# Patient Record
Sex: FEMALE | Race: WHITE | NOT HISPANIC OR LATINO | Employment: OTHER | URBAN - METROPOLITAN AREA
[De-identification: names, ages, dates, MRNs, and addresses within clinical notes are randomized per-mention and may not be internally consistent; named-entity substitution may affect disease eponyms.]

---

## 2017-04-17 ENCOUNTER — TRANSCRIBE ORDERS (OUTPATIENT)
Dept: ADMINISTRATIVE | Facility: HOSPITAL | Age: 72
End: 2017-04-17

## 2017-04-17 ENCOUNTER — ALLSCRIPTS OFFICE VISIT (OUTPATIENT)
Dept: OTHER | Facility: OTHER | Age: 72
End: 2017-04-17

## 2017-04-17 ENCOUNTER — HOSPITAL ENCOUNTER (OUTPATIENT)
Dept: RADIOLOGY | Facility: HOSPITAL | Age: 72
Discharge: HOME | End: 2017-04-17
Attending: INTERNAL MEDICINE
Payer: MEDICARE

## 2017-04-17 DIAGNOSIS — D86.9 SARCOIDOSIS: ICD-10-CM

## 2017-04-17 DIAGNOSIS — R06.09 OTHER FORMS OF DYSPNEA: ICD-10-CM

## 2017-04-17 PROCEDURE — 71020 HB CHEST X-RAY 2VW FRONTAL&LATL: CPT

## 2017-04-18 ENCOUNTER — GENERIC CONVERSION - ENCOUNTER (OUTPATIENT)
Dept: OTHER | Facility: OTHER | Age: 72
End: 2017-04-18

## 2017-04-19 ENCOUNTER — GENERIC CONVERSION - ENCOUNTER (OUTPATIENT)
Dept: OTHER | Facility: OTHER | Age: 72
End: 2017-04-19

## 2017-08-08 ENCOUNTER — TRANSCRIBE ORDERS (OUTPATIENT)
Dept: LAB | Facility: CLINIC | Age: 72
End: 2017-08-08

## 2017-08-08 ENCOUNTER — APPOINTMENT (OUTPATIENT)
Dept: LAB | Facility: CLINIC | Age: 72
End: 2017-08-08
Payer: MEDICARE

## 2017-08-08 DIAGNOSIS — I48.0 PAROXYSMAL ATRIAL FIBRILLATION (HCC): Primary | ICD-10-CM

## 2017-08-08 LAB
INR PPP: 5.4 (ref 0.86–1.16)
PROTHROMBIN TIME: 50.3 SECONDS (ref 12.1–14.4)

## 2017-08-08 PROCEDURE — 85610 PROTHROMBIN TIME: CPT | Performed by: INTERNAL MEDICINE

## 2017-08-08 PROCEDURE — 36415 COLL VENOUS BLD VENIPUNCTURE: CPT | Performed by: INTERNAL MEDICINE

## 2017-08-17 ENCOUNTER — APPOINTMENT (OUTPATIENT)
Dept: LAB | Facility: CLINIC | Age: 72
End: 2017-08-17
Payer: MEDICARE

## 2017-08-17 ENCOUNTER — TRANSCRIBE ORDERS (OUTPATIENT)
Dept: LAB | Facility: CLINIC | Age: 72
End: 2017-08-17

## 2017-08-17 DIAGNOSIS — I48.0 PAROXYSMAL ATRIAL FIBRILLATION (HCC): ICD-10-CM

## 2017-08-17 DIAGNOSIS — I48.0 PAROXYSMAL ATRIAL FIBRILLATION (HCC): Primary | ICD-10-CM

## 2017-08-17 LAB
INR PPP: 2.12 (ref 0.86–1.16)
PROTHROMBIN TIME: 24 SECONDS (ref 12.1–14.4)

## 2017-08-17 PROCEDURE — 36415 COLL VENOUS BLD VENIPUNCTURE: CPT

## 2017-08-17 PROCEDURE — 85610 PROTHROMBIN TIME: CPT

## 2017-08-25 ENCOUNTER — APPOINTMENT (OUTPATIENT)
Dept: LAB | Facility: CLINIC | Age: 72
End: 2017-08-25
Payer: MEDICARE

## 2017-08-25 ENCOUNTER — TRANSCRIBE ORDERS (OUTPATIENT)
Dept: LAB | Facility: CLINIC | Age: 72
End: 2017-08-25

## 2017-08-25 DIAGNOSIS — I48.0 PAROXYSMAL ATRIAL FIBRILLATION (HCC): ICD-10-CM

## 2017-08-25 DIAGNOSIS — I48.0 PAROXYSMAL ATRIAL FIBRILLATION (HCC): Primary | ICD-10-CM

## 2017-08-25 LAB
INR PPP: 2.48 (ref 0.86–1.16)
PROTHROMBIN TIME: 27.1 SECONDS (ref 12.1–14.4)

## 2017-08-25 PROCEDURE — 85610 PROTHROMBIN TIME: CPT

## 2017-08-25 PROCEDURE — 36415 COLL VENOUS BLD VENIPUNCTURE: CPT

## 2017-09-01 ENCOUNTER — APPOINTMENT (OUTPATIENT)
Dept: LAB | Facility: CLINIC | Age: 72
End: 2017-09-01
Payer: MEDICARE

## 2017-09-01 ENCOUNTER — TRANSCRIBE ORDERS (OUTPATIENT)
Dept: LAB | Facility: CLINIC | Age: 72
End: 2017-09-01

## 2017-09-01 DIAGNOSIS — I48.0 PAROXYSMAL ATRIAL FIBRILLATION (HCC): ICD-10-CM

## 2017-09-01 DIAGNOSIS — I48.0 PAROXYSMAL ATRIAL FIBRILLATION (HCC): Primary | ICD-10-CM

## 2017-09-01 LAB
INR PPP: 2.96 (ref 0.86–1.16)
PROTHROMBIN TIME: 31.2 SECONDS (ref 12.1–14.4)

## 2017-09-01 PROCEDURE — 36415 COLL VENOUS BLD VENIPUNCTURE: CPT

## 2017-09-01 PROCEDURE — 85610 PROTHROMBIN TIME: CPT

## 2017-09-08 ENCOUNTER — APPOINTMENT (OUTPATIENT)
Dept: LAB | Facility: CLINIC | Age: 72
End: 2017-09-08
Payer: MEDICARE

## 2017-09-08 ENCOUNTER — TRANSCRIBE ORDERS (OUTPATIENT)
Dept: LAB | Facility: CLINIC | Age: 72
End: 2017-09-08

## 2017-09-08 DIAGNOSIS — I48.0 PAROXYSMAL ATRIAL FIBRILLATION (HCC): Primary | ICD-10-CM

## 2017-09-08 LAB
INR PPP: 2.39 (ref 0.86–1.16)
PROTHROMBIN TIME: 26.4 SECONDS (ref 12.1–14.4)

## 2017-09-08 PROCEDURE — 85610 PROTHROMBIN TIME: CPT | Performed by: INTERNAL MEDICINE

## 2017-09-08 PROCEDURE — 36415 COLL VENOUS BLD VENIPUNCTURE: CPT | Performed by: INTERNAL MEDICINE

## 2017-09-15 ENCOUNTER — TRANSCRIBE ORDERS (OUTPATIENT)
Dept: LAB | Facility: CLINIC | Age: 72
End: 2017-09-15

## 2017-09-15 ENCOUNTER — APPOINTMENT (OUTPATIENT)
Dept: LAB | Facility: CLINIC | Age: 72
End: 2017-09-15
Payer: MEDICARE

## 2017-09-15 DIAGNOSIS — I48.0 PAROXYSMAL ATRIAL FIBRILLATION (HCC): Primary | ICD-10-CM

## 2017-09-15 DIAGNOSIS — I48.0 PAROXYSMAL ATRIAL FIBRILLATION (HCC): ICD-10-CM

## 2017-09-15 LAB
INR PPP: 2.33 (ref 0.86–1.16)
PROTHROMBIN TIME: 25.8 SECONDS (ref 12.1–14.4)

## 2017-09-15 PROCEDURE — 85610 PROTHROMBIN TIME: CPT

## 2017-09-15 PROCEDURE — 36415 COLL VENOUS BLD VENIPUNCTURE: CPT

## 2017-09-27 ENCOUNTER — GENERIC CONVERSION - ENCOUNTER (OUTPATIENT)
Dept: OTHER | Facility: OTHER | Age: 72
End: 2017-09-27

## 2017-10-09 ENCOUNTER — APPOINTMENT (OUTPATIENT)
Dept: LAB | Facility: CLINIC | Age: 72
End: 2017-10-09
Payer: MEDICARE

## 2017-10-09 ENCOUNTER — TRANSCRIBE ORDERS (OUTPATIENT)
Dept: LAB | Facility: CLINIC | Age: 72
End: 2017-10-09

## 2017-10-09 DIAGNOSIS — I25.5 ISCHEMIC CARDIOMYOPATHY: Primary | ICD-10-CM

## 2017-10-09 DIAGNOSIS — I25.5 ISCHEMIC CARDIOMYOPATHY: ICD-10-CM

## 2017-10-09 DIAGNOSIS — Z01.812 ENCOUNTER FOR PRE-OPERATIVE LABORATORY TESTING: ICD-10-CM

## 2017-10-09 LAB
APTT PPP: 41 SECONDS (ref 23–35)
INR PPP: 2.61 (ref 0.86–1.16)
PROTHROMBIN TIME: 28.3 SECONDS (ref 12.1–14.4)

## 2017-10-09 PROCEDURE — 85730 THROMBOPLASTIN TIME PARTIAL: CPT

## 2017-10-09 PROCEDURE — 36415 COLL VENOUS BLD VENIPUNCTURE: CPT

## 2017-10-09 PROCEDURE — 85610 PROTHROMBIN TIME: CPT | Performed by: INTERNAL MEDICINE

## 2017-10-16 ENCOUNTER — ALLSCRIPTS OFFICE VISIT (OUTPATIENT)
Dept: OTHER | Facility: OTHER | Age: 72
End: 2017-10-16

## 2017-10-16 ENCOUNTER — APPOINTMENT (OUTPATIENT)
Dept: LAB | Facility: HOSPITAL | Age: 72
End: 2017-10-16
Payer: MEDICARE

## 2017-10-16 ENCOUNTER — TRANSCRIBE ORDERS (OUTPATIENT)
Dept: ADMINISTRATIVE | Facility: HOSPITAL | Age: 72
End: 2017-10-16

## 2017-10-16 ENCOUNTER — HOSPITAL ENCOUNTER (OUTPATIENT)
Dept: RADIOLOGY | Facility: HOSPITAL | Age: 72
Discharge: HOME | End: 2017-10-16
Payer: MEDICARE

## 2017-10-16 DIAGNOSIS — D86.9 SARCOIDOSIS: ICD-10-CM

## 2017-10-16 DIAGNOSIS — R06.09 OTHER FORMS OF DYSPNEA: ICD-10-CM

## 2017-10-16 DIAGNOSIS — R06.02 SOB (SHORTNESS OF BREATH): Primary | ICD-10-CM

## 2017-10-16 LAB
ALBUMIN SERPL BCP-MCNC: 3.4 G/DL (ref 3.5–5)
ALP SERPL-CCNC: 91 U/L (ref 46–116)
ALT SERPL W P-5'-P-CCNC: 58 U/L (ref 12–78)
ANION GAP SERPL CALCULATED.3IONS-SCNC: 9 MMOL/L (ref 4–13)
AST SERPL W P-5'-P-CCNC: 32 U/L (ref 5–45)
BASOPHILS # BLD AUTO: 0 THOUSANDS/ΜL (ref 0–0.1)
BASOPHILS NFR BLD AUTO: 0 % (ref 0–1)
BILIRUB SERPL-MCNC: 0.4 MG/DL (ref 0.2–1)
BUN SERPL-MCNC: 19 MG/DL (ref 5–25)
CALCIUM SERPL-MCNC: 9.2 MG/DL (ref 8.3–10.1)
CHLORIDE SERPL-SCNC: 103 MMOL/L (ref 100–108)
CO2 SERPL-SCNC: 29 MMOL/L (ref 21–32)
CREAT SERPL-MCNC: 0.99 MG/DL (ref 0.6–1.3)
DEPRECATED D DIMER PPP: 890 NG/ML (FEU) (ref 190–520)
EOSINOPHIL # BLD AUTO: 0.3 THOUSAND/ΜL (ref 0–0.61)
EOSINOPHIL NFR BLD AUTO: 4 % (ref 0–6)
ERYTHROCYTE [DISTWIDTH] IN BLOOD BY AUTOMATED COUNT: 16.4 % (ref 11.6–15.1)
GFR SERPL CREATININE-BSD FRML MDRD: 58 ML/MIN/1.73SQ M
GLUCOSE SERPL-MCNC: 97 MG/DL (ref 65–140)
HCT VFR BLD AUTO: 39.6 % (ref 37–47)
HGB BLD-MCNC: 13.1 G/DL (ref 12–16)
LYMPHOCYTES # BLD AUTO: 0.6 THOUSANDS/ΜL (ref 0.6–4.47)
LYMPHOCYTES NFR BLD AUTO: 9 % (ref 14–44)
MCH RBC QN AUTO: 31 PG (ref 27–31)
MCHC RBC AUTO-ENTMCNC: 33.2 G/DL (ref 31.4–37.4)
MCV RBC AUTO: 93 FL (ref 82–98)
MONOCYTES # BLD AUTO: 0.6 THOUSAND/ΜL (ref 0.17–1.22)
MONOCYTES NFR BLD AUTO: 10 % (ref 4–12)
NEUTROPHILS # BLD AUTO: 5 THOUSANDS/ΜL (ref 1.85–7.62)
NEUTS SEG NFR BLD AUTO: 77 % (ref 43–75)
NRBC BLD AUTO-RTO: 0 /100 WBCS
NT-PROBNP SERPL-MCNC: 1921 PG/ML
PLATELET # BLD AUTO: 290 THOUSANDS/UL (ref 130–400)
PMV BLD AUTO: 8.1 FL (ref 8.9–12.7)
POTASSIUM SERPL-SCNC: 3.5 MMOL/L (ref 3.5–5.3)
PROT SERPL-MCNC: 7.7 G/DL (ref 6.4–8.2)
RBC # BLD AUTO: 4.24 MILLION/UL (ref 4.2–5.4)
SODIUM SERPL-SCNC: 141 MMOL/L (ref 136–145)
WBC # BLD AUTO: 6.5 THOUSAND/UL (ref 4.8–10.8)

## 2017-10-16 PROCEDURE — 80053 COMPREHEN METABOLIC PANEL: CPT

## 2017-10-16 PROCEDURE — 82164 ANGIOTENSIN I ENZYME TEST: CPT

## 2017-10-16 PROCEDURE — 83880 ASSAY OF NATRIURETIC PEPTIDE: CPT

## 2017-10-16 PROCEDURE — 85025 COMPLETE CBC W/AUTO DIFF WBC: CPT

## 2017-10-16 PROCEDURE — 36415 COLL VENOUS BLD VENIPUNCTURE: CPT

## 2017-10-16 PROCEDURE — 85379 FIBRIN DEGRADATION QUANT: CPT

## 2017-10-16 PROCEDURE — 71020 HB CHEST X-RAY 2VW FRONTAL&LATL: CPT

## 2017-10-17 ENCOUNTER — GENERIC CONVERSION - ENCOUNTER (OUTPATIENT)
Dept: OTHER | Facility: OTHER | Age: 72
End: 2017-10-17

## 2017-10-17 LAB — ACE SERPL-CCNC: 113 U/L (ref 14–82)

## 2017-10-18 ENCOUNTER — GENERIC CONVERSION - ENCOUNTER (OUTPATIENT)
Dept: OTHER | Facility: OTHER | Age: 72
End: 2017-10-18

## 2017-10-24 ENCOUNTER — GENERIC CONVERSION - ENCOUNTER (OUTPATIENT)
Dept: OTHER | Facility: OTHER | Age: 72
End: 2017-10-24

## 2017-10-25 NOTE — PROGRESS NOTES
Assessment  1  History of Coronary Artery Quadruple Venous Bypass Graft   2  Pulmonary hypertension (416 8) (I27 20)   3  Sarcoidosis (135) (D86 9)   4  Dyspnea on exertion (786 09) (R06 09)    Plan   (1) ANGIOTENSIN CONVERSIN ENZYME; Status:Resulted - Requires Verification;   Done: 04AJY8487 12:00AM  Due:16Oct2018; Ordered; For:Dyspnea on exertion, Sarcoidosis; Ordered By:Freedom Martinez;   (1) D-DIMER; Status:Resulted - Requires Verification;   Done: 54KKX7240 12:00AM  Due:16Oct2018; Ordered; For:Dyspnea on exertion, Sarcoidosis; Ordered By:Freedom Martinez;   (1) CBC/PLT/DIFF; Status:Resulted - Requires Verification;   Done: 35OQA0048 12:00AM  Due:16Oct2018; Ordered; For:Dyspnea on exertion; Ordered By:Misty Martinez;   (1) COMPREHENSIVE METABOLIC PANEL; Status:Resulted - Requires Verification;   Done: 34DKD4051 12:00AM  Due:16Oct2018; Ordered; For:Dyspnea on exertion; Ordered By:Misty Martinez;   (1) NT- BNP (PRO BRAIN NATRIURETIC PEPTIDE); Status:Resulted - Requires Verification;   Done: 03MGB6876 12:00AM  Due:16Oct2018; Ordered; For:Dyspnea on exertion; Ordered By:Freedom Martinez;   * XR CHEST PA & LATERAL; Status:Resulted - Requires Verification;   Done: 06ZEW6573 12:00AM  Due:16Oct2018; Ordered; For:Dyspnea on exertion, Sarcoidosis; Ordered By:Freedom Martinez; Results/Data  PFT Results v2:     Spirometry: Forced vital capacity: 1 68L-- and-- 74% Predicted Values  Forced expiratory volume in one second: 1 20L-- and-- 71% Predicted Value  Post Bronchodilator Spirometry: Forced vital capacity : 71L  Lung Volumes:   DLCO:    PFT Interpretation:   mild airway obstruction  Obstruction ratio is 71% of predicted  this is likely 2nd to air trapping  Discussion/Summary  Discussion Summary:   [facility] a has dyspnea on exertion which is chronic for her room air oxygen is 96% and with ambulation when is low was 91%   She had coronary artery bypass graft done in June 2017 at [facility]  According to [facility] her cardiologist with like her to have a defibrillator placed  However she had chest x-ray done in subsequent CT of chest October 6, 2017 for which there are multiple areas of consolidation lung fields bilaterally and emphysematous changes the lung fields she has multiple areas of consolidation in the left upper lobe a 2 cm area as well as left lower lobe 1 6 x 2 cm area and 1 5 area of consolidation in the right middle lobe  did review CT of the chest which [facility] had in the past there were lung nodules that were noted at that time  She does have biopsy-proven sarcoidosis  [facility] has some cough, it is not particularly bothersome  She does have shortness of breath which is her main issue an obvious coronary artery disease  I am requesting copy of CT drawn to review and also requesting D- dimer CBC with differential complete metabolic profile BNP Ace level chest x-raydefers nocturnal pulse oximetry  She has a history of obstructive sleep apnea but was not odor not able to tolerate CPAP  She defers any inhaler at this point for which I think Brian [place] would be appropriate for her but she deferswill be reviewing CT drawn of CT scan, chest x-ray and laboratory data am also requesting medical records from her cardiologist in [facility] as well as her hospital stay at [facility] on Sara 10, 2017 for coronary artery disease  Counseling Documentation With Imm: The patient was counseled regarding  Active Problems  1  Allergic Reaction (995 3)   2  Candidiasis, cutaneous (112 3) (B37 2)   3  Dyspnea on exertion (786 09) (R06 09)   4  Eczema (692 9) (L30 9)   5  Encounter for screening mammogram for malignant neoplasm of breast (V76 12)   (Z12 31)   6  Headache (784 0) (R51)   7  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)   8  Hypoxia, sleep related (327 24) (G47 34)   9  Insomnia (780 52) (G47 00)   10  Lumbar disc disorder with myelopathy (722 73) (M51 06)   11   Lymphadenopathy (785 6) (R59 1)   12  Lymphadenopathy, mediastinal (785 6) (R59 0)   13  Obstructive sleep apnea (327 23) (G47 33)   14  Pedal edema (782 3) (R60 0)   15  Pulmonary hypertension (416 8) (I27 20)   16  Pulmonary nodule seen on imaging study (793 11) (R91 1)   17  Sarcoidosis (135) (D86 9)   18  Shortness of breath (786 05) (R06 02)   19  Snoring (786 09) (R06 83)   20  Well adult on routine health check (V70 0) (Z00 00)    Chief Complaint  Chief Complaint Free Text Note Form: Grupo Pritchett is here for CT Scan results   Chief Complaint Chronic Condition [place] Distance: Patient is here today for follow up of chronic conditions described in HPI  History of Present Illness  HPI: Grupo Pritchett is a 30-year-old female who is here today for follow-up  She was last seen by Dr Mustapha Iraheta to in April 2017 apparently she had chest x-ray done and right hilar prominence with seen as well as air space disease bilaterally she has a history of sarcoidosis and airspace disease in the left lung was more prominent   She had CT of the chest without contrast on October 6, 2017 mediastinal and bilateral hilar adenopathy was noted as well as a tiny left pleural effusion she also has a 1 cm gallstone in the gallbladder and emphysematous changes of the lung fields bilaterally 2 cm area of consolidation is present the left upper lobe 1 6 and 2 cm area of consolidation present a left lower lobe 1 5 area consolidation present in the right middle lobe as well as a 4 x 2 x 2 cm area of consolidation in right lower lobe this was done on October 6, 2017last visit patient states that she had a quadruple bypass surgery done this was done at [facility] in done in June 2nd 2017did have nocturnal pulse oximetry ordered but did not have this done she is not using any supplemental oxygen at this point it was requested that she have a complete PFT done this was requested by Dr Mustapha Iraheta however this was not done is Grupo Hence is had complications and has been in the hospital for coronary artery bypass graftingdoes have history of HERMANN  She had sleep study done on July 15, 2014 with AHI of 8 9 but was unable to tolerate CPAP therapy she has not used oxygen she defers oxygen at this point  did have a CT of the chest without contrast in June of 2015 I did review this there is emphysematous changes as well as stable bilateral nodule appearing densities in scarring with no new nodules seenis here today as CT of the chest was done at the [facility] and I do not have the CD ROM available for visualizationwas seen by Dr Laisha Zamora continue 2014 she did have a right supraclavicular lymph node biopsy done in June 2014 that revealed evidence of sarcoidosis small bilateral lung nodules and mediastinal adenopathy are caused by the same Ace level was evaluated 1 23 units/mL      Review of Systems  Complete-Female - Pulm:   Constitutional: feeling tired, but-- No fever, no chills, feels well, no tiredness, no recent weight gain or weight loss  Eyes: no complaints of vision problems  ENT: no rhinitis, no PND, no epistaxis  Cardiovascular: no palpitations, no chest pain  Respiratory: shortness of breath,-- wheezing-- and-- shortness of breath during exertion, but-- as noted in HPI--    The patient presents with complaints of gradual onset of occasional episodes of moderate cough, described as dry  Her symptoms are caused by no known event  Symptoms are unchanged  Gastrointestinal: no complaints of esophageal reflux, no abdominal pain  Genitourinary: no dysuria  Musculoskeletal: no arthralgias, no joint swelling, no myalgias  Integumentary: no rash, no lesions  Neurological: no headache, no fainting, no weakness  Psychiatric: no anxiety, no depression  Hematologic/Lymphatic: â no complaints of swollen glands  Past Medical History  1  History of Acute upper respiratory infection (465 9) (J06 9)   2  History of Cough (786 2) (R05)   3   History of Bell's palsy (V12 49) (Z86 69)   4  History of shortness of breath (V13 89) (L95 067)    Surgical History  1  History of Biopsy Lymph Node   2  History of Coronary Artery Quadruple Venous Bypass Graft   3  History of Hysterectomy  Surgical History Reviewed: The surgical history was reviewed and updated today  Family History  Sister    1  Family history of Ovarian cancer    Social History   · Being A Social Drinker   · Daily Coffee Consumption (2  Cups/Day)   · Former smoker (V15 82) (M92 727)   · Smoked 1 1/2 ppd for 30 years and quit 28 years ago  Social History Reviewed: The social history was reviewed and updated today  Current Meds   1  Aspirin 81 MG TABS; Therapy: (Recorded:73Zux2092) to Recorded  Medication List Reviewed: The medication list was reviewed and updated today  Allergies  1  No Known Drug Allergies    Vitals  Vital Signs    Recorded: 97EPJ0042 10:56AM   Temperature 97 5 F, Oral   Heart Rate 64   Pulse Quality Normal   Respiration Quality Normal   Respiration 18   Systolic 782, LUE, Sitting   Diastolic 68, LUE, Sitting   Height 4 ft 10 in   Weight 212 lb    BMI Calculated 44 31   BSA Calculated 1 87   O2 Saturation 95, RA     Physical Exam    Constitutional   General appearance: No acute distress, well appearing and well nourished  Eyes   Examination of pupil and irises: Anicteric, pupils reactive  Ears, Nose, Mouth, and Throat   External inspection of ears and nose: Normal     Nasal mucosa, septum, and turbinates: Normal without edema or erythema  Lips, teeth, and gums: Normal, good dentition  Oropharynx: Normal with no erythema, edema, exudate or lesions  Mallampati Classification: 4  Neck   Neck: Supple, symmetric, trachea midline, no masses  Jugular veins: Normal     Pulmonary   Chest: Normal     Respiratory effort: No increased work of breathing or signs of respiratory distress  Auscultation of lungs: Clear to auscultation, no rales, no crackles, no wheezing  Cardiovascular   Auscultation of heart: Normal rate and rhythm, normal S1 and S2, no murmurs  Pedal pulses: 2+ bilaterally  Examination of extremities for edema and/or varicosities: Normal     Abdomen   Abdomen: Soft, non-tender  Lymphatic   Palpation of lymph nodes in neck: No lymphadenopathy  Musculoskeletal   Gait and station: Normal     Digits and nails: Normal without clubbing or cyanosis  Neurologic   Mental Status: Normal  Not confused, no evidence of dementia, good comprehension, good concentration  Motor Exam: Gross motor function normal     Skin   Skin and subcutaneous tissue: Limited exam shows no rash  Psychiatric   Orientation to person, place and time: Normal     Mood and affect: Normal        Health Management  Encounter for screening mammogram for malignant neoplasm of breast   Digital Bilateral Screening Mammogram With CAD; every 1 year; Last 51NGE5413; Next Due:  46BDD7003; Overdue  Lymphadenopathy   Digital Bilateral Screening Mammogram With CAD; every 1 year; Last 97BSW9876; Next Due:  55OFL7785;  Overdue    Signatures   Electronically signed by : CHRYSTAL Hood; Oct 16 2017 11:42AM EST                       (Author)    Electronically signed by : CHRYSTAL Hood; Oct 18 2017 11:42AM EST                       (Author)    Electronically signed by : NISHANT Swan ; Oct 24 2017 12:58PM EST                       (Author)

## 2017-10-31 ENCOUNTER — GENERIC CONVERSION - ENCOUNTER (OUTPATIENT)
Dept: OTHER | Facility: OTHER | Age: 72
End: 2017-10-31

## 2017-10-31 ENCOUNTER — HOSPITAL ENCOUNTER (OUTPATIENT)
Dept: PULMONOLOGY | Facility: HOSPITAL | Age: 72
Discharge: HOME | End: 2017-10-31
Payer: MEDICARE

## 2017-10-31 DIAGNOSIS — R06.02 SOB (SHORTNESS OF BREATH): ICD-10-CM

## 2017-10-31 PROCEDURE — 94729 DIFFUSING CAPACITY: CPT

## 2017-10-31 PROCEDURE — 94760 N-INVAS EAR/PLS OXIMETRY 1: CPT

## 2017-10-31 PROCEDURE — 94060 EVALUATION OF WHEEZING: CPT

## 2017-10-31 PROCEDURE — 94726 PLETHYSMOGRAPHY LUNG VOLUMES: CPT

## 2017-10-31 RX ORDER — ALBUTEROL SULFATE 2.5 MG/3ML
2.5 SOLUTION RESPIRATORY (INHALATION) ONCE
Status: DISCONTINUED | OUTPATIENT
Start: 2017-10-31 | End: 2017-11-04 | Stop reason: HOSPADM

## 2017-11-01 ENCOUNTER — GENERIC CONVERSION - ENCOUNTER (OUTPATIENT)
Dept: OTHER | Facility: OTHER | Age: 72
End: 2017-11-01

## 2017-11-06 ENCOUNTER — GENERIC CONVERSION - ENCOUNTER (OUTPATIENT)
Dept: OTHER | Facility: OTHER | Age: 72
End: 2017-11-06

## 2017-11-06 ENCOUNTER — ALLSCRIPTS OFFICE VISIT (OUTPATIENT)
Dept: OTHER | Facility: OTHER | Age: 72
End: 2017-11-06

## 2018-01-10 NOTE — PROGRESS NOTES
Assessment   1  Pulmonary nodule seen on imaging study (793 11) (R91 1)   2  Sarcoidosis (135) (D86 9)   3  Dyspnea on exertion (786 09) (R06 09)    Plan   Sarcoidosis    · PredniSONE 10 MG Oral Tablet; 1 tab daily x 10 days then 1/2 tab daily   Rx By: Jagdish Cazares; Dispense: 30 Days ; #:30 Tablet; Refill: 0;For: Sarcoidosis; DANIELITO = N; Verified Transmission to [address] [place] [address] [address]; Msg to Pharmacy: extra tabs; Last Updated By: System, [employer]; 2017 9:59:35 AM   · CT CHEST W CONTRAST; Status:Need Information - Financial Authorization; Requested    PTW:[id number]; Perform:[facility]; [id number];BNVMGWR; For:Sarcoidosis; Ordered By:Ghislaine Martinez; Results/Data   PFT Results v2:      Spirometry: Forced vital capacity: 1 84L-- and-- 78% Predicted Values  Forced expiratory volume in one second: 1 43L-- and-- 80% Predicted Value  FEV1/FVC ratio is 78  Post Bronchodilator Spirometry:    Lung Volumes: Total lung capacity : 4 15L-- and-- 96% Predicted Values  RV: 1 89L-- and-- 96% Predicted Values  DLCO:      DLCO 52% Predicted Values  DLCO (Hgb Jose) 3 32% Predicted Values      PFT Interpretation:      No restricted or obstructive defect, diminished diffusion capacity that normalizes with correction for alveolar ventilation  Discussion/Summary   Discussion Summary:    Buster Puckett had biopsy-proven sarcoidosis  CD ROM of CT of chest done in 2017 was reviewed  Adenopathy is stable and lung lesions are somewhat enlarged  She did have complete PFT  FVC was 1 84 L or 78% of predicted, FEV1 1 43 L or 80% of predicted and obstruction ratio was 78  Residual volume was 96% of predicted total lung capacity 96% of predicted diffusion capacity was reduced to 52, corrected for alveolar volume was 81%  There was no restrictive or obstructive defect, and diminished diffusion capacity normalizes with correction for alveolar ventilation   is stable for surgery, she is currently on prednisone 10 mg daily for the next 10 days, and will reduce to 5 mg daily  This will continue until after insertion of defibrillator  She will follow-up in the next 2 months  Optimally that should be done after defibrillator placement  of chest will be done in May of 2018  This will be done with contrast  reports feeling less short of breath with prednisone and is aware that this is for a limited time that she remains on prednisone  Goals and Barriers: The patient has the current Goals: Jose Davis will follow calendar for prednisone taper  Patient's Capacity to Self-Care: Patient is able to Self-Care  Active Problems   1  Allergic Reaction (995 3)   2  Atrial fibrillation, unspecified type (427 31) (I48 91)   3  Candidiasis, cutaneous (112 3) (B37 2)   4  Dyspnea on exertion (786 09) (R06 09)   5  Eczema (692 9) (L30 9)   6  Encounter for screening mammogram for malignant neoplasm of breast (V76 12)     (Z12 31)   7  Essential hypertension (401 9) (I10)   8  Headache (784 0) (R51)   9  History of hyperlipidemia (V12 29) (Z86 39)   10  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)   11  Hypoxia, sleep related (327 24) (G47 34)   12  Insomnia (780 52) (G47 00)   13  Lumbar disc disorder with myelopathy (722 73) (M51 06)   14  Lymphadenopathy (785 6) (R59 1)   15  Lymphadenopathy, mediastinal (785 6) (R59 0)   16  Obstructive sleep apnea (327 23) (G47 33)   17  Pedal edema (782 3) (R60 0)   18  Pulmonary hypertension (416 8) (I27 20)   19  Pulmonary nodule seen on imaging study (793 11) (R91 1)   20  Sarcoidosis (135) (D86 9)   21  Shortness of breath (786 05) (R06 02)   22  Snoring (786 09) (R06 83)   23  Well adult on routine health check (V70 0) (Z00 00)    Chief Complaint   Chief Complaint Free Text Note Form: Jose Davis is here for f/u  She c/o sob all the time, especially walking short distance        History of Present Illness   HPI: Jose Davis is a 23-year-old female who is here today for follow-up  She had CT of chest done at [facility]  He fell adenopathy was stable but lung lesions were somewhat enlarged  She will have repeat CT in 6 months  She also agreed to complete pulmonary function test  [facility]  This was done October 6, 2017  There were multiple areas of consolidations in the lung fields bilaterally, and emphysematous changes of the lung field  CD ROM was obtained from [facility]  This was reviewed by Dr Jess Bloom  He is cardiologist in half firs he  She has cardiomyopathy  And is on amiodarone  It was suggested that she have defibrillator placed  was seen by a DR Debra Nicole, cardiologist  Jess Bloom felt that Stacy Yu was stable for insertion of defibrillator  She is on 20 mg x5 days and then 10 mg x5 days she will  She will continue on 10 mg and then go to 5 mg after defibrillator is placed  Review of Systems   Complete-Female - Pulm:      Constitutional: No fever, no chills, feels well, no tiredness, no recent weight gain or weight loss  Eyes: no complaints of vision problems  ENT: no rhinitis, no PND, no epistaxis  Cardiovascular: no palpitations, no chest pain  Respiratory: shortness of breath-- and-- shortness of breath during exertion, but-- as noted in HPI  Gastrointestinal: no complaints of esophageal reflux, no abdominal pain  Genitourinary: no dysuria  Musculoskeletal: no arthralgias, no joint swelling, no myalgias  Integumentary: no rash, no lesions  Neurological: no headache, no fainting, no weakness  Psychiatric: no anxiety, no depression  Hematologic/Lymphatic: â no complaints of swollen glands  Past Medical History   1  History of Acute upper respiratory infection (465 9) (J06 9)   2  History of Cough (786 2) (R05)   3  History of Bell's palsy (V12 49) (Z86 69)   4  History of shortness of breath (V13 89) (D11 372)    Surgical History   1  History of Biopsy Lymph Node   2   History of Coronary Artery Quadruple Venous Bypass Graft   3  History of Hysterectomy  Surgical History Reviewed: The surgical history was reviewed and updated today  Family History   Sister    1  Family history of Ovarian cancer  Family History Reviewed: The family history was reviewed and updated today  Social History    · Being A Social Drinker   · Daily Coffee Consumption (2  Cups/Day)   · Former smoker (V15 82) (L54 201)   · Smoked 1 1/2 ppd for 30 years and quit 28 years ago  Social History Reviewed: The social history was reviewed and updated today  Current Meds    1  Amiodarone HCl - 200 MG Oral Tablet; TAKE 1 TABLET DAILY; Therapy: 43QMN3841 to (Evaluate:17Nov2017); Last Rx:19Lzf4077 Ordered   2  Aspirin 81 MG TABS; Therapy: (Margarite Solum) to Recorded   3  Atorvastatin Calcium 10 MG Oral Tablet; TAKE 1 TABLET DAILY; Therapy: 59XHI0762 to (Evaluate:17Nov2017); Last Rx:17Dfe1082 Ordered   4  Eplerenone 50 MG Oral Tablet; TAKE 1 TABLET DAILY; Therapy: 03PEL3218 to (Evaluate:17Nov2017); Last Rx:18Oct2017 Ordered   5  Furosemide 20 MG Oral Tablet; 1 tab po bid; Therapy: 28HUA2220 to (Last Rx:18Oct2017) Ordered   6  Losartan Potassium 25 MG Oral Tablet; TAKE 1 TABLET DAILY AS DIRECTED; Therapy: 00RTX8722 to (Evaluate:17Nov2017); Last Rx:30Hbe9602 Ordered   7  Metoprolol Tartrate 50 MG Oral Tablet; TAKE 1 TABLET DAILY AS DIRECTED; Therapy: 15OLI6692 to (Evaluate:17Nov2017)  Requested for: 19REZ6599; Last     Rx:84Pzu9942 Ordered   8  PredniSONE 10 MG Oral Tablet; 2 tabs daily x 5days, 1 tab daily x 5 days; Therapy: 72MDJ5493 to [phone])  Requested for: 35JUD2127; Last     Rx:18Oct2017 Ordered   9  Warfarin Sodium 6 MG Oral Tablet; TAKE 1 TABLET DAILY AS DIRECTED; Therapy: 87EAZ5775 to (Evaluate:17Nov2017); Last Rx:18Oct2017 Ordered  Medication List Reviewed: The medication list was reviewed and updated today  Allergies   1   No Known Drug Allergies    Vitals   Vital Signs    Recorded: 93WYR4728 09:30AM   Temperature 97 8 F, Oral   Heart Rate 68   Pulse Quality Normal   Systolic 519, RUE, Sitting   Diastolic 70, RUE, Sitting   Height 4 ft 10 in   Weight 216 lb    BMI Calculated 45 14   BSA Calculated 1 88   O2 Saturation 96, RA     Physical Exam        Constitutional      General appearance: Abnormal   obese  Ears, Nose, Mouth, and Throat      Nasal mucosa, septum, and turbinates: Normal without edema or erythema  Lips, teeth, and gums: Normal, good dentition  Oropharynx: Normal with no erythema, edema, exudate or lesions  Mallampati Classification: 3  Neck      Neck: Supple, symmetric, trachea midline, no masses  Jugular veins: Normal        Pulmonary      Chest: Normal        Respiratory effort: No increased work of breathing or signs of respiratory distress  Auscultation of lungs: Clear to auscultation, no rales, no crackles, no wheezing  Cardiovascular      Auscultation of heart: Normal rate and rhythm, normal S1 and S2, no murmurs  Examination of extremities for edema and/or varicosities: Normal        Abdomen      Abdomen: Soft, non-tender  Lymphatic      Palpation of lymph nodes in neck: No lymphadenopathy  Musculoskeletal      Gait and station: Normal        Digits and nails: Normal without clubbing or cyanosis  Neurologic      Mental Status: Normal  Not confused, no evidence of dementia, good comprehension, good concentration  Motor Exam: Gross motor function normal        Skin      Skin and subcutaneous tissue: Limited exam shows no rash  Psychiatric      Orientation to person, place and time: Normal        Mood and affect: Normal        Health Management   Encounter for screening mammogram for malignant neoplasm of breast   Digital Bilateral Screening Mammogram With CAD; every 1 year; Last 24IRF9693; Next Due:    66LBV5181;  Overdue  Lymphadenopathy   Digital Bilateral Screening Mammogram With CAD; every 1 year; Last 35AZQ4408; Next Due:    69FSH5176;  Overdue    Signatures    Electronically signed by : CHRYSTAL Mcgraw; Nov 6 2017 10:15AM EST                       (Author)     Electronically signed by : NISHANT York ; Jan 11 2018 11:24AM EST                       (Author)

## 2018-01-11 ENCOUNTER — ALLSCRIPTS OFFICE VISIT (OUTPATIENT)
Dept: OTHER | Facility: OTHER | Age: 73
End: 2018-01-11

## 2018-01-11 NOTE — MISCELLANEOUS
Message  I discussed results of nocturnal pulse oximetry done on room air with the patient on 4/29/16  Her average oxygen saturation was 91% and lowest was 74%  She spent only 14 minutes below 88%  Snehal Ring has not been using the oxygen as it causes her nasal congestion  She is not having any nocturnal dyspnea  She is overweight  She had no significant sustained oxygen desaturation I will have her oxygen discontinued by Drake Preston   I did advise her to lose weight as this would help tremendously with her oxygenation at night      Plan  Sarcoidosis    · Discontinue Home Oxygen; Status:Complete;   Done: 80CXP6342    Signatures   Electronically signed by : NISHANT Kitchen ; May  5 2016  5:15PM EST                       (Author)

## 2018-01-12 VITALS
BODY MASS INDEX: 44.5 KG/M2 | RESPIRATION RATE: 18 BRPM | HEART RATE: 64 BPM | TEMPERATURE: 97.5 F | HEIGHT: 58 IN | SYSTOLIC BLOOD PRESSURE: 118 MMHG | WEIGHT: 212 LBS | DIASTOLIC BLOOD PRESSURE: 68 MMHG | OXYGEN SATURATION: 95 %

## 2018-01-12 NOTE — MISCELLANEOUS
Message  Message Free Text Note Form: [facility] is currently on 10 mg prednisone and is feeling better  She will remain on 10mg for next ten days and then will stay on maintanance dose of 5 mg daily until defibrillator placed  CT of chest was reviewed by Dr Rosa Blount via CD ROM  Adenopathy is stable and lung lesions somewhat inlarged  She has bx proven sarcoidois  She is agreeable to complete PFT  this will be reviewed and then evaluation for defibrillator will be made         Signatures   Electronically signed by : CHRYSTAL Franklin; Oct 24 2017 10:41AM EST                       (Author)

## 2018-01-12 NOTE — MISCELLANEOUS
Message  Message Free Text Note Form: jacqueline likely has SOB that is multifiactorial  She is going to agree to begin oral prednsione  I am awaiting for CD ROM to review as she had abnormal CT at [facility]   She has exertional dyspnea and sarcoidosis  I will begin low dose of prednisone; 20 mg x 5 days, 10mg x 5 days  In the interim, I will review CD ROM CT of chest and speak with Dr Daniel Cadena or Dr Jordan Moulton regarding plan of care  Awaiting cardiology records from [place], [place]       Plan    1  Amiodarone HCl - 200 MG Oral Tablet; TAKE 1 TABLET DAILY   2  Warfarin Sodium 6 MG Oral Tablet; TAKE 1 TABLET DAILY AS DIRECTED    3  Eplerenone 50 MG Oral Tablet; TAKE 1 TABLET DAILY   4  Furosemide 20 MG Oral Tablet; 1 tab po bid   5  Losartan Potassium 25 MG Oral Tablet; TAKE 1 TABLET DAILY AS DIRECTED   6  Metoprolol Tartrate 50 MG Oral Tablet; TAKE 1 TABLET DAILY AS DIRECTED    7  Atorvastatin Calcium 10 MG Oral Tablet; TAKE 1 TABLET DAILY    8   PredniSONE 10 MG Oral Tablet; 2 tabs daily x 5days, 1 tab daily x 5 days    Signatures   Electronically signed by : CHRYSTAL Angel; Oct 18 2017 12:02PM EST                       (Author)

## 2018-01-13 VITALS
DIASTOLIC BLOOD PRESSURE: 86 MMHG | TEMPERATURE: 97.7 F | WEIGHT: 215 LBS | SYSTOLIC BLOOD PRESSURE: 134 MMHG | HEART RATE: 85 BPM | BODY MASS INDEX: 43.42 KG/M2 | OXYGEN SATURATION: 100 % | RESPIRATION RATE: 12 BRPM

## 2018-01-14 VITALS
SYSTOLIC BLOOD PRESSURE: 128 MMHG | DIASTOLIC BLOOD PRESSURE: 70 MMHG | TEMPERATURE: 97.8 F | OXYGEN SATURATION: 96 % | HEIGHT: 58 IN | HEART RATE: 68 BPM | WEIGHT: 216 LBS | BODY MASS INDEX: 45.34 KG/M2

## 2018-01-14 NOTE — RESULT NOTES
Message  results of pulse oximetry reviewed from April 2016  Mean oxygen sat was 91%  Only periodic mild oxygen desaturation  Patient does not want the oxygen so will discontinue oxygen        Plan  Sarcoidosis    · Discontinue Home Oxygen; Status:Complete;   Done: [id number]    Signatures   Electronically signed by : INSHANT Rivera ; May  6 2016  4:44PM EST                       (Author)

## 2018-01-15 NOTE — PROGRESS NOTES
Assessment   1  Sarcoidosis (135) (D86 9)   2  Dyspnea on exertion (786 09) (R06 09)   3  Obstructive sleep apnea (327 23) (G47 33)   4  Morbid obesity, unspecified obesity type (278 01) (E66 01)    Plan   Sarcoidosis    · PredniSONE 10 MG Oral Tablet; take 1 tab x 10 days then 1/2 tab x 10 days  Extra    tabs given   Rx By: Jaren Can; Dispense: 20 Days ; #:30 Tablet; Refill: 0;For: Sarcoidosis; DANIELITO = N; Verified Transmission to [address] [place] [address]; Last Updated By: [employer]; 1/11/2018 12:03:11 PM   · Symbicort 160-4 5 MCG/ACT Inhalation Aerosol; INHALE 2 PUFFS Daily   Rx By: Jaren Can; Dispense: 30 Days ; #:1 GM; Refill: 0;For: Sarcoidosis; DANIELITO = N; Dispense Sample   · (1) ANGIOTENSIN CONVERSIN ENZYME; Status:Active; Requested for:01May2018; Perform:[facility]; KCP:[id number]; For:Sarcoidosis; Ordered By:Ila Knott;   · (1) BASIC METABOLIC PROFILE; Status:Active; Requested for:01May2018; Perform:[facility]; KDX:[id number];KSVOCIU; For:Sarcoidosis; Ordered By:Ila Knott; Sarcoidosis, Shortness of breath    · CT CHEST W CONTRAST; Status:Need Information - Financial Authorization; Requested    for:14May2018; Perform:[facility]; ZJO:[id number]; For:Sarcoidosis, Shortness of breath; Ordered By:Jairo Knott;    Discussion/Summary   Discussion Summary:    Increased exertional dyspnea and intermittent wheeze may which may be due to her sarcoidosis  Will give her a short course of prednisone 10 mg for 10 days followed by 5 mg for 10 days  I also will give her trial Symbicort she can use 160 mcg 2 puffs twice a day to see if this helps her breathing and intermittent wheezing  She will let me know if this Symbicort helps her  She has a history of moderate HERMANN but could not tolerate CPAP therapy   She also has history coronary disease and had CABG done June 2017 also at [facility] ID device placed December 2017 and [facility]      She is overweight I did encourage to lose weight  I did order a follow-up CT scan of the chest with contrast regarding sarcoidosis to be done May 2018  She will have a serum Ace level and BMP done at that time as well  Follow-up in May  Counseling Documentation With Imm: The was counseled regarding instructions for management,-- risk factor reductions,-- impressions  total time of encounter was 30 minutes-- and-- 20 minutes was spent counseling  Goals and Barriers: The patient has the current Goals: Try to lose weight  Patient's Capacity to Self-Care: Patient is able to Self-Care  Medication SE Review and Pt Understands Tx: Possible side effects of new medications were reviewed with the patient/guardian today  The treatment plan was reviewed with the patient/guardian  The patient/guardian understands and agrees with the treatment plan      Active Problems   1  Allergic Reaction (995 3)   2  Atrial fibrillation, unspecified type (427 31) (I48 91)   3  Candidiasis, cutaneous (112 3) (B37 2)   4  Dyspnea on exertion (786 09) (R06 09)   5  Eczema (692 9) (L30 9)   6  Encounter for screening mammogram for malignant neoplasm of breast (V76 12)     (Z12 31)   7  Essential hypertension (401 9) (I10)   8  Headache (784 0) (R51)   9  History of hyperlipidemia (V12 29) (Z86 39)   10  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)   11  Hypoxia, sleep related (327 24) (G47 34)   12  Insomnia (780 52) (G47 00)   13  Lumbar disc disorder with myelopathy (722 73) (M51 06)   14  Lymphadenopathy (785 6) (R59 1)   15  Lymphadenopathy, mediastinal (785 6) (R59 0)   16  Obstructive sleep apnea (327 23) (G47 33)   17  Pedal edema (782 3) (R60 0)   18  Pulmonary hypertension (416 8) (I27 20)   19  Pulmonary nodule seen on imaging study (793 11) (R91 1)   20  Sarcoidosis (135) (D86 9)   21  Shortness of breath (786 05) (R06 02)   22  Snoring (786 09) (R06 83)   23   Well adult on routine health check (V70 0) (Z00 00)    Chief Complaint   Chief Complaint Free Text Note Form: Pt presents today for 2m f/u  pt states that she had a defibrillator in stalled  pt states that she has been very tired since the procedure  Pt state that she can't walk far before feeling sob  Pt reports no cough  History of Present Illness   HPI: Usama Vizcarra Has a history of sarcoidosis  she is complaining of some increased dyspnea with activity  She had a right supraclavicular ultrasound guided biopsy done by Dr Deann Hinojosa on 6/2/14 and this revealed non-necrotizing granulomatous lymphadenitis consistent with sarcoidosis  AFB and fungal stains were negative  Serum ACE level done on 5/29 was elevated at 123 u/l  CT scan of the chest done May 15, 2014 showed some small bilateral lung nodules and mediastinal adenopathy  Stacy Yu does have some exertional dyspnea but she is overweight  she does have some mild leg edema  Echocardiogram done June 11 shows normal LV function with an ejection fraction of 60 to 65%, RSVP estimated at 44 mm Hg, and some decreased LV compliance likely due to diastolic dysfunction  Nocturnal pulse oximetry recording was done on 5/28/14 and her lowest oxygen saturation was 68% and mean was 90%  She spent 84 minutes < 88%  She does admit to snoring and sometimes wakes up with shortness of breath at night  She does have excessive daytime somnolence and this is evidenced by an Amenia Sleepiness score of 13  No morning headaches  No fever, or night sweats  Spirometry done May 2014 revealed normal lung volumes  she is now using 2 liters/minute of nasal cannula oxygen at bedtime because of her sleep related hypoxemia  This could be due to HERMANN and possibly alveolar hypoventilation  Stacy Yu does get short of breath with activity  She is not having much in way of any cough  She does have some occasional wheezing   She did have a pacemaker back / ACID implanted December 23, 2017 at [facility]  She of prior coronary artery bypass grafting done 2017 at the same hospital  She is     during her admission for her open heart surgery  This was CABG x4 vessels 2017  Review of Systems   Complete-Female - Pulm:      Constitutional: No fever, no chills, feels well, no tiredness, no recent weight gain or weight loss  Eyes: no complaints of vision problems  ENT: no rhinitis, no PND, no epistaxis  Cardiovascular: no palpitations, no chest pain  Respiratory: as noted in HPI  Gastrointestinal: no complaints of esophageal reflux, no abdominal pain  Genitourinary: no dysuria  Musculoskeletal: no arthralgias, no joint swelling, no myalgias  Integumentary: no rash, no lesions  Neurological: no headache, no fainting, no weakness  Psychiatric: no anxiety, no depression  Hematologic/Lymphatic: â no complaints of swollen glands  ROS Reviewed:    ROS reviewed  Past Medical History   1  History of Acute upper respiratory infection (465 9) (J06 9)   2  History of Cough (786 2) (R05)   3  History of Bell's palsy (V12 49) (Z86 69)   4  History of shortness of breath (V13 89) (Z03 716)    Surgical History   1  History of Biopsy Lymph Node   2  History of Cardio-defib Pulse Generator Implantation Date   3  History of Coronary Artery Quadruple Venous Bypass Graft   4  History of Hysterectomy  Surgical History Reviewed: The surgical history was reviewed and updated today  Family History   Sister    1  Family history of Ovarian cancer  Family History Reviewed: The family history was reviewed and updated today  Social History    · Being A Social Drinker   · Daily Coffee Consumption (2  Cups/Day)   · Former smoker (V15 82) (F84 124)   · Smoked 1 1/2 ppd for 30 years and quit 28 years ago  Social History Reviewed: The social history was reviewed and updated today  The social history was reviewed and is unchanged  Current Meds    1  Aspirin 81 MG TABS; Therapy: (Carmie Ivana) to Recorded   2  Atorvastatin Calcium 10 MG Oral Tablet; TAKE 1 TABLET DAILY; Therapy: 15USV7663 to (Evaluate:17Nov2017); Last Rx:18Oct2017 Ordered   3  Eplerenone 50 MG Oral Tablet; TAKE 1 TABLET DAILY; Therapy: 65ADM2315 to (Evaluate:17Nov2017); Last Rx:18Oct2017 Ordered   4  Furosemide 20 MG Oral Tablet; 1 tab po bid; Therapy: 00LBS4802 to (Last Rx:18Oct2017) Ordered   5  Losartan Potassium 25 MG Oral Tablet; TAKE 1 TABLET DAILY AS DIRECTED; Therapy: 25JGN0721 to (Evaluate:17Nov2017); Last Rx:18Oct2017 Ordered   6  Metoprolol Tartrate 50 MG Oral Tablet; TAKE 1 TABLET DAILY AS DIRECTED; Therapy: 60EEW8110 to (Mey Forbes)  Requested for: 13SRE8490; Last     Rx:18Oct2017 Ordered  Medication List Reviewed: The medication list was reviewed and updated today  Allergies   1  No Known Drug Allergies    Vitals   Vital Signs    Recorded: 64JYV6461 11:24AM   Temperature 97 7 F, Oral   Heart Rate 66   Pulse Quality Normal   Respiration Quality Normal   Respiration 12   Systolic 549, LUE, Sitting   Diastolic 82, LUE, Sitting   Height 4 ft 10 in   Weight 221 lb    BMI Calculated 46 19   BSA Calculated 1 9   O2 Saturation 93, RA     Physical Exam        Constitutional      General appearance: No acute distress, well appearing and well nourished  Ears, Nose, Mouth, and Throat      Nasal mucosa, septum, and turbinates: Normal without edema or erythema  Lips, teeth, and gums: Normal, good dentition  Oropharynx: Normal with no erythema, edema, exudate or lesions  Neck      Neck: Supple, symmetric, trachea midline, no masses  Jugular veins: Normal        Pulmonary      Auscultation of lungs: Clear to auscultation, no rales, no crackles, no wheezing  Cardiovascular      Auscultation of heart: Normal rate and rhythm, normal S1 and S2, no murmurs         Examination of extremities for edema and/or varicosities: Normal        Abdomen      Abdomen: Soft, non-tender  Lymphatic      Palpation of lymph nodes in neck: No lymphadenopathy  Musculoskeletal      Gait and station: Normal        Digits and nails: Normal without clubbing or cyanosis  Neurologic      Mental Status: Normal  Not confused, no evidence of dementia, good comprehension, good concentration  Skin      Skin and subcutaneous tissue: Limited exam shows no rash  Psychiatric      Orientation to person, place and time: Normal        Mood and affect: Normal        Health Management   Encounter for screening mammogram for malignant neoplasm of breast   Digital Bilateral Screening Mammogram With CAD; every 1 year; Last 04SIP1419; Next Due:    59DGT3309; Overdue  Lymphadenopathy   Digital Bilateral Screening Mammogram With CAD; every 1 year; Last 35MEC5777; Next Due:    51JKH2586;  Overdue    Signatures    Electronically signed by : NISHANT Rey ; Jan 14 2018 10:03AM EST                       (Author)

## 2018-01-17 NOTE — MISCELLANEOUS
November 6, 2017        Dr Ana Vásquez    Dear Dr Jeannine Donahue is a patient in this office  She had complete PFT's  done 10/31/17  There is no restrictive or obstructive defect seen on PFT  She does  have diminished diffusion capacity that normalized after correction for alveolar volume  A copy of the complete PFT is attached for your review  CT of the  chest done October 6, 2107 was reviewed by pulmonologist, Dr Vero Contreras  Compared to CT of chest done June, 2016, adenopathy is stable  Bilateral nodularity is somewhat increased in size but otherwise stable  Repeat CT of chest with contrast  will be done in six months  She has biopsy proven diagnosis of sarcoidosis  She was seen in office today  She is currently on prednisone 10 mg daily and will decrease to 5 mg in ten days  She will remain on 5 mg until after the anticipated surgical  procedure  She is stable from a pulmonary standpoint for surgical procedure  Please do not hesitate to contact this office if there are any further questions  Very truly yours,        Leena Pelletier, [address]      Electronically signed by:Misty JARRELL  Nov 6 2017 11:21AM EST

## 2018-01-17 NOTE — RESULT NOTES
Message  Outpatient to discuss chest x-ray findings with her  No answer and voicemail was left will try again tomorrow        Signatures   Electronically signed by : BEAU Dhillon ; Apr 18 2017  2:53PM EST                       (Author)

## 2018-01-18 NOTE — MISCELLANEOUS
November 1, 2017        Dr Oli Etienne    Dear Dr Louis Wilson is a patient at this office  She had complete PFT's done 10/31/17  There is no restrictive or obstuctive defect seen on PFT  She did have diminshed diffusion capacity that normalized with correction for alveolor ventilation  A copy of the complete PFT is attached for your review  CD ROM of CT of chest that  was done October 6, 2017 was reviewed by Dr Melinda Garza  This was compared to CT of chest done June, 2016  Bilateral nodularity  is stable;  repeat CT of chest with contrast will be repeated in 6 months  She does have biopsy proven diagnosis of sarcoidosis  Louvenia Rings should tolerate defibrillator placement and will remain on prednisone 5 mg until after the procedure  She is clinically stable from pulmonary standpoint for procedure  Please do not hesitate to contact this office with any further questions  Very truly yours,        Milton Bright 56[address]          Electronically signed by:Misty JARRELL  Nov 1 2017  4:16PM EST

## 2018-01-23 VITALS
WEIGHT: 221 LBS | OXYGEN SATURATION: 93 % | HEIGHT: 58 IN | DIASTOLIC BLOOD PRESSURE: 82 MMHG | RESPIRATION RATE: 12 BRPM | HEART RATE: 66 BPM | TEMPERATURE: 97.7 F | BODY MASS INDEX: 46.39 KG/M2 | SYSTOLIC BLOOD PRESSURE: 112 MMHG

## 2018-03-30 ENCOUNTER — TRANSCRIBE ORDERS (OUTPATIENT)
Dept: LAB | Facility: CLINIC | Age: 73
End: 2018-03-30

## 2018-03-30 ENCOUNTER — APPOINTMENT (OUTPATIENT)
Dept: LAB | Facility: CLINIC | Age: 73
End: 2018-03-30
Payer: MEDICARE

## 2018-03-30 DIAGNOSIS — D86.9 SARCOIDOSIS: ICD-10-CM

## 2018-03-30 LAB
ANION GAP SERPL CALCULATED.3IONS-SCNC: 6 MMOL/L (ref 4–13)
BUN SERPL-MCNC: 23 MG/DL (ref 5–25)
CALCIUM SERPL-MCNC: 9.2 MG/DL (ref 8.3–10.1)
CHLORIDE SERPL-SCNC: 103 MMOL/L (ref 100–108)
CO2 SERPL-SCNC: 29 MMOL/L (ref 21–32)
CREAT SERPL-MCNC: 0.9 MG/DL (ref 0.6–1.3)
GFR SERPL CREATININE-BSD FRML MDRD: 65 ML/MIN/1.73SQ M
GLUCOSE SERPL-MCNC: 76 MG/DL (ref 65–140)
POTASSIUM SERPL-SCNC: 4.2 MMOL/L (ref 3.5–5.3)
SODIUM SERPL-SCNC: 138 MMOL/L (ref 136–145)

## 2018-03-30 PROCEDURE — 82164 ANGIOTENSIN I ENZYME TEST: CPT

## 2018-03-30 PROCEDURE — 36415 COLL VENOUS BLD VENIPUNCTURE: CPT

## 2018-03-30 PROCEDURE — 80048 BASIC METABOLIC PNL TOTAL CA: CPT

## 2018-04-02 LAB — ACE SERPL-CCNC: 185 U/L (ref 14–82)

## 2018-04-03 ENCOUNTER — NEW REFERRAL (OUTPATIENT)
Dept: URBAN - METROPOLITAN AREA CLINIC 27 | Facility: CLINIC | Age: 73
End: 2018-04-03

## 2018-04-03 DIAGNOSIS — H43.89: ICD-10-CM

## 2018-04-03 DIAGNOSIS — H43.391: ICD-10-CM

## 2018-04-03 DIAGNOSIS — H25.9: ICD-10-CM

## 2018-04-03 DIAGNOSIS — H43.812: ICD-10-CM

## 2018-04-03 PROCEDURE — 92235 FLUORESCEIN ANGRPH MLTIFRAME: CPT

## 2018-04-03 PROCEDURE — 92250 FUNDUS PHOTOGRAPHY W/I&R: CPT

## 2018-04-03 PROCEDURE — 92004 COMPRE OPH EXAM NEW PT 1/>: CPT

## 2018-04-03 PROCEDURE — 92134 CPTRZ OPH DX IMG PST SGM RTA: CPT

## 2018-04-03 ASSESSMENT — VISUAL ACUITY
OD_PH: 20/25
OS_CC: 20/100
OD_CC: 20/30

## 2018-04-03 ASSESSMENT — TONOMETRY
OD_IOP_MMHG: 13
OS_IOP_MMHG: 13

## 2018-04-06 ENCOUNTER — HOSPITAL ENCOUNTER (OUTPATIENT)
Dept: RADIOLOGY | Facility: HOSPITAL | Age: 73
Discharge: HOME | End: 2018-04-06
Attending: INTERNAL MEDICINE
Payer: MEDICARE

## 2018-04-06 DIAGNOSIS — R06.02 SHORTNESS OF BREATH: ICD-10-CM

## 2018-04-06 DIAGNOSIS — D86.9 SARCOIDOSIS: ICD-10-CM

## 2018-04-06 PROCEDURE — 71260 CT THORAX DX C+: CPT

## 2018-04-06 RX ORDER — AMIODARONE HYDROCHLORIDE 200 MG/1
200 TABLET ORAL
COMMUNITY
End: 2019-02-06 | Stop reason: ALTCHOICE

## 2018-04-06 RX ORDER — PREDNISOLONE ACETATE 10 MG/ML
SUSPENSION/ DROPS OPHTHALMIC
Refills: 0 | COMMUNITY
Start: 2018-03-28 | End: 2019-10-09

## 2018-04-06 RX ORDER — ATORVASTATIN CALCIUM 10 MG/1
10 TABLET, FILM COATED ORAL
COMMUNITY
End: 2021-03-19 | Stop reason: SDUPTHER

## 2018-04-06 RX ORDER — WARFARIN SODIUM 4 MG/1
4 TABLET ORAL
COMMUNITY
End: 2019-02-06 | Stop reason: ALTCHOICE

## 2018-04-06 RX ORDER — RAMIPRIL 2.5 MG/1
2.5 CAPSULE ORAL
COMMUNITY

## 2018-04-06 RX ORDER — BUDESONIDE AND FORMOTEROL FUMARATE DIHYDRATE 160; 4.5 UG/1; UG/1
2 AEROSOL RESPIRATORY (INHALATION) DAILY
COMMUNITY
Start: 2018-01-11 | End: 2018-07-24 | Stop reason: ALTCHOICE

## 2018-04-06 RX ORDER — EPLERENONE 50 MG/1
TABLET, FILM COATED ORAL
Refills: 0 | COMMUNITY
Start: 2018-03-09 | End: 2019-02-06 | Stop reason: ALTCHOICE

## 2018-04-06 RX ORDER — WARFARIN SODIUM 6 MG/1
1 TABLET ORAL DAILY
COMMUNITY
Start: 2017-10-18 | End: 2019-02-06 | Stop reason: ALTCHOICE

## 2018-04-06 RX ORDER — LOSARTAN POTASSIUM 25 MG/1
TABLET ORAL
Refills: 0 | COMMUNITY
Start: 2018-03-22

## 2018-04-06 RX ORDER — METOPROLOL SUCCINATE 50 MG/1
TABLET, EXTENDED RELEASE ORAL
Refills: 0 | COMMUNITY
Start: 2018-03-14 | End: 2019-10-09

## 2018-04-06 RX ORDER — PREDNISONE 10 MG/1
5 TABLET ORAL DAILY
COMMUNITY
Start: 2017-11-06 | End: 2019-10-09

## 2018-04-06 RX ORDER — FUROSEMIDE 20 MG/1
TABLET ORAL
Refills: 0 | COMMUNITY
Start: 2018-03-08 | End: 2019-10-09

## 2018-04-06 RX ADMIN — IOHEXOL 100 ML: 350 INJECTION, SOLUTION INTRAVENOUS at 11:28

## 2018-04-10 ENCOUNTER — OFFICE VISIT (OUTPATIENT)
Dept: PULMONOLOGY | Facility: MEDICAL CENTER | Age: 73
End: 2018-04-10
Payer: MEDICARE

## 2018-04-10 VITALS
SYSTOLIC BLOOD PRESSURE: 118 MMHG | HEART RATE: 76 BPM | TEMPERATURE: 97.8 F | DIASTOLIC BLOOD PRESSURE: 74 MMHG | HEIGHT: 59 IN | OXYGEN SATURATION: 94 % | RESPIRATION RATE: 18 BRPM | BODY MASS INDEX: 44.35 KG/M2 | WEIGHT: 220 LBS

## 2018-04-10 DIAGNOSIS — J43.9 BULLOUS EMPHYSEMA (HCC): ICD-10-CM

## 2018-04-10 DIAGNOSIS — D86.9 SARCOIDOSIS: Primary | ICD-10-CM

## 2018-04-10 PROCEDURE — 99214 OFFICE O/P EST MOD 30 MIN: CPT | Performed by: NURSE PRACTITIONER

## 2018-04-10 NOTE — PATIENT INSTRUCTIONS
Sarcoidosis   WHAT YOU NEED TO KNOW:   Sarcoidosis is a condition in which inflammatory cells collect in tissues and organs  These cells form granulomas (lumps) in the lungs, skin, lymph nodes, or eyes  DISCHARGE INSTRUCTIONS:   Medicines:   · Cytotoxic medicines: These decrease redness, pain, and swelling, and help slow down your immune system       · Steroids: This medicine helps decrease inflammation  · NSAIDs:  These medicines decrease swelling and pain  You can buy NSAIDs without a doctor's order  Ask your healthcare provider which medicine is right for you and how much to take  Take as directed  NSAIDs can cause stomach bleeding or kidney problems if not taken correctly  · Acetaminophen: This medicine decreases pain and fever  You can buy acetaminophen without a doctor's order  Ask how much to take and how often to take it  Follow directions  Acetaminophen can cause liver damage if not taken correctly  · Take your medicine as directed  Contact your healthcare provider if you think your medicine is not helping or if you have side effects  Tell him of her if you are allergic to any medicine  Keep a list of the medicines, vitamins, and herbs you take  Include the amounts, and when and why you take them  Bring the list or the pill bottles to follow-up visits  Carry your medicine list with you in case of an emergency  Follow up with your healthcare provider or specialist as directed: You may need to return to have regular checkups and eye exams  Write down your questions so you remember to ask them during your visits  Self-care:   · Eat a variety of healthy foods:  Healthy foods include fruits, vegetables, whole-grain breads, low-fat dairy products, beans, lean meats, and fish  Ask if you need to be on a special diet  · Get plenty of exercise:  Ask your healthcare provider or specialist about the best exercise plan for you   Exercise may help decrease fatigue and improve your symptoms of sarcoidosis  · Do not smoke: If you smoke, it is never too late to quit  Smoking increases your risk of further lung problems  Ask your healthcare provider or specialist for information if you need help quitting  Contact your healthcare provider or specialist if:   · You have a fever  · You have a severe headache and pain in your neck  · You have chills, a cough, or feel weak and achy  · You have pain, redness, and swelling in your muscles and joints  · Your skin is itchy, swollen, or has a rash  · You have questions about your condition or care  Return to the emergency department if:   · You cannot feel your arms or legs, or they become weak  · You have seizures  · You have sudden trouble breathing  · You have trouble thinking and remembering things  · You have severe chest pain  © 2017 [employer]  Information is for End User's use only and may not be sold, redistributed or otherwise used for commercial purposes  All illustrations and images included in CareNotes® are the copyrighted property of A D A M [employer] Inc  or Naveen Pierce  The above information is an  only  It is not intended as medical advice for individual conditions or treatments  Talk to your doctor, nurse or pharmacist before following any medical regimen to see if it is safe and effective for you

## 2018-04-10 NOTE — ASSESSMENT & PLAN NOTE
Alyssa Dean has biopsy-proven sarcoidosis she was diagnosed in 2014  He had a biopsy done and this revealed findings consistent with sarcoidosis  AFB in fungal stains were negative  She also has diastolic dysfunction and mild pulmonary artery hypertension  Since her last visit she has been less short of breath  She had wheezing at her last visit but this has subsided   Alyssa Dean did have repeat CT of the chest done on April 6, 2018  Official report is pending  Did review images and will do so with Dr Knott  Findings appear to be stable  She does have calcified granulomas that are stable in appearance  Ace level was drawn on March 30, 2018  Level is 185  Compared to Ace level drawn on October 16, 2017 it is somewhat increased  Plan is for follow-up in 3 months  I will order repeat Ace level to be done in 3 months  She does have prednisone on hand if her shortness of breath increases

## 2018-04-10 NOTE — ASSESSMENT & PLAN NOTE
[facility] has bullous emphysema  This was seen on CT of the chest   She was a smoker for 40+ years  She quit at the age of approximately 37to 49years old  He smoked pack or pack and half per day  [facility] was given Symbicort at her last visit that down this irritated her oral mucous membranes and throat  I did offer anticholinergic which he defers at this point  [facility] is a way that treatment options include tapering dose of prednisone as well as inhaled corticosteroid

## 2018-04-10 NOTE — PROGRESS NOTES
Assessment/Plan:     Problem List Items Addressed This Visit        Respiratory    Bullous emphysema ([address] )     Matthias Win has bullous emphysema  This was seen on CT of the chest   She was a smoker for 40+ years  She quit at the age of approximately 37to 49years old  He smoked pack or pack and half per day  Matthias Win was given Symbicort at her last visit that down this irritated her oral mucous membranes and throat  I did offer anticholinergic which he defers at this point  Matthias Win is a way that treatment options include tapering dose of prednisone as well as inhaled corticosteroid  Relevant Medications    predniSONE 10 mg tablet    budesonide-formoterol (SYMBICORT) 160-4 5 mcg/act inhaler       Other    Sarcoidosis - Primary     Matthias Win has biopsy-proven sarcoidosis she was diagnosed in 2014  He had a biopsy done and this revealed findings consistent with sarcoidosis  AFB in fungal stains were negative  She also has diastolic dysfunction and mild pulmonary artery hypertension  Since her last visit she has been less short of breath  She had wheezing at her last visit but this has subsided   Matthias Win did have repeat CT of the chest done on April 6, 2018  Official report is pending  Did review images and will do so with Dr Knott  Findings appear to be stable  She does have calcified granulomas that are stable in appearance  Ace level was drawn on March 30, 2018  Level is 185  Compared to Ace level drawn on October 16, 2017 it is somewhat increased  Plan is for follow-up in 3 months  I will order repeat Ace level to be done in 3 months  She does have prednisone on hand if her shortness of breath increases  Relevant Orders    Angiotensin converting enzyme            No Follow-up on file  All questions are answered to the patient's satisfaction and understanding  She verbalizes understanding  She is encouraged to call with any further questions or concerns      Portions of the record may have been created with voice recognition software  Occasional wrong word or "sound a like" substitutions may have occurred due to the inherent limitations of voice recognition software  Read the chart carefully and recognize, using context, where substitutions have occurred  ______________________________________________________________________    Chief Complaint:   Chief Complaint   Patient presents with    Follow-up     Imaging results     Shortness of Breath     Walking       Patient ID: Paola Ruiz is a 71 y o  y o  female has no past medical history on file  4/10/2018  Paola Ruiz is a 60-year-old female who has history of sarcoidosis she was last seen by Dr Knott on January 11, 2018  At that time she had reported increased shortness of breath and wheezing  He felt that this could be secondary to sarcoidosis  She was given Symbicort to trial for shortness of breath  She used for 1 week but found that she had developed mouth sores and discontinued  Paola Ruiz was also given a short course of prednisone but did not take as she felt she did needed  Paola Ruiz has a history of sarcoidosis  She had a right supraclavicular ultrasound-guided biopsy done in June 2014  This revealed non-necrotizing granulomatous lymphadeniitis consistent with sarcoidosis  AFB and fungal stains were negative  Serum Ace level was elevated at 123  She has some exertional dyspnea  He also had 2D echo done June 11, 2017 that showed normal LV function with EF of 60-65%  RSV P was estimated 44 mm of mercury with some decreased left ventricular compliance due to diastolic dysfunction  Paola Ruiz did have nocturnal pulse oximetry done in May of 2014  Lowest oxygen saturation was 68 and mean was 90%  She spent 84 minutes below 88%  She has an [place] Sleepiness Scale 13  This likely could be secondary to obstructive sleep apnea and possibly alveolar hypoventilation    Paola Ruiz had pacemaker/AC ID implanted December 23, 2017 at [facility]   She has history of prior coronary artery bypass in June 2017 at the same hospital   Jr Yao is here today for follow-up  He had CT of the chest done April 6, 2018  Shortness of Breath   This is a chronic problem  The current episode started more than 1 year ago  The problem occurs daily  The problem has been unchanged  Associated symptoms include wheezing  The symptoms are aggravated by exercise  The patient has no known risk factors for DVT/PE  She has tried beta agonist inhalers and oral steroids for the symptoms  The treatment provided mild relief  Review of Systems   Constitutional: Negative  HENT: Negative  Eyes: Positive for visual disturbance  Respiratory: Positive for shortness of breath and wheezing  Cardiovascular: Negative  Gastrointestinal: Negative  Endocrine: Negative  Genitourinary: Negative  Skin: Negative  Allergic/Immunologic: Negative  Neurological: Negative  Hematological: Negative  Psychiatric/Behavioral: Negative  Smoking history: She reports that she quit smoking about 31 years ago  She has a 60 00 pack-year smoking history   She has never used smokeless tobacco     The following portions of the patient's history were reviewed and updated as appropriate: allergies, current medications, past family history, past medical history, past social history, past surgical history and problem list     Immunization History   Administered Date(s) Administered    Influenza Split High Dose Preservative Free IM 10/01/2014    Influenza TIV (IM) 09/10/2013    Pneumococcal Polysaccharide PPV23 10/01/2014     Current Outpatient Prescriptions   Medication Sig Dispense Refill    aspirin 81 MG tablet Take by mouth      atorvastatin (LIPITOR) 10 mg tablet Take 10 mg by mouth      eplerenone (INSPRA) 50 MG tablet   0    furosemide (LASIX) 20 mg tablet   0    losartan (COZAAR) 25 mg tablet   0    metoprolol succinate (TOPROL-XL) 50 mg 24 hr tablet   0    amiodarone 200 mg tablet Take 200 mg by mouth      budesonide-formoterol (SYMBICORT) 160-4 5 mcg/act inhaler Inhale 2 puffs daily      prednisoLONE acetate (PRED FORTE) 1 % ophthalmic suspension instill 1 drop into left eye four times a day  0    predniSONE 10 mg tablet Take by mouth      ramipril (ALTACE) 2 5 mg capsule Take 2 5 mg by mouth      warfarin (COUMADIN) 4 mg tablet Take 4 mg by mouth      warfarin (COUMADIN) 6 mg tablet Take 1 tablet by mouth daily       No current facility-administered medications for this visit  Allergies: Patient has no known allergies  Objective:  Vitals:    04/10/18 1124   BP: 118/74   BP Location: Right arm   Patient Position: Sitting   Cuff Size: Standard   Pulse: 76   Resp: 18   Temp: 97 8 °F (36 6 °C)   TempSrc: Oral   SpO2: 94%   Weight: 99 8 kg (220 lb)   Height: 4' 11" (1 499 m)   Oxygen Therapy  SpO2: 94 %    Wt Readings from Last 3 Encounters:   04/10/18 99 8 kg (220 lb)   01/11/18 100 kg (221 lb)   11/06/17 98 kg (216 lb)     Body mass index is 44 43 kg/m²  Physical Exam   Constitutional: She appears well-developed and well-nourished  HENT:   Head: Normocephalic and atraumatic  Mallampati 3   Eyes: Conjunctivae are normal  Pupils are equal, round, and reactive to light  Neck: Normal range of motion  Neck supple  Cardiovascular: Normal rate and regular rhythm  Pulmonary/Chest: Effort normal and breath sounds normal    Abdominal: Soft  Musculoskeletal: Normal range of motion  Neurological: She is alert  She has normal reflexes  Skin: Skin is warm and dry  Psychiatric: She has a normal mood and affect   Her behavior is normal  Thought content normal        Lab Review:   Appointment on 03/30/2018   Component Date Value    Sodium 03/30/2018 138     Potassium 03/30/2018 4 2     Chloride 03/30/2018 103     CO2 03/30/2018 29     Anion Gap 03/30/2018 6     BUN 03/30/2018 23     Creatinine 03/30/2018 0 90     Glucose 03/30/2018 76     Calcium 03/30/2018 9 2     eGFR 03/30/2018 65     Angio Convert Enzyme 03/30/2018 185*   Appointment on 10/16/2017   Component Date Value    Angio Convert Enzyme 10/16/2017 113*    D-Dimer, Quant 10/16/2017 890*    WBC 10/16/2017 6 50     RBC 10/16/2017 4 24     Hemoglobin 10/16/2017 13 1     Hematocrit 10/16/2017 39 6     MCV 10/16/2017 93     MCH 10/16/2017 31 0     MCHC 10/16/2017 33 2     RDW 10/16/2017 16 4*    MPV 10/16/2017 8 1*    Platelets 30/83/2676 290     nRBC 10/16/2017 0     Neutrophils Relative 10/16/2017 77*    Lymphocytes Relative 10/16/2017 9*    Monocytes Relative 10/16/2017 10     Eosinophils Relative 10/16/2017 4     Basophils Relative 10/16/2017 0     Neutrophils Absolute 10/16/2017 5 00     Lymphocytes Absolute 10/16/2017 0 60     Monocytes Absolute 10/16/2017 0 60     Eosinophils Absolute 10/16/2017 0 30     Basophils Absolute 10/16/2017 0 00     Sodium 10/16/2017 141     Potassium 10/16/2017 3 5     Chloride 10/16/2017 103     CO2 10/16/2017 29     Anion Gap 10/16/2017 9     BUN 10/16/2017 19     Creatinine 10/16/2017 0 99     Glucose 10/16/2017 97     Calcium 10/16/2017 9 2     AST 10/16/2017 32     ALT 10/16/2017 58     Alkaline Phosphatase 10/16/2017 91     Total Protein 10/16/2017 7 7     Albumin 10/16/2017 3 4*    Total Bilirubin 10/16/2017 0 40     eGFR 10/16/2017 58     NT-proBNP 10/16/2017 1921*       Diagnostics:  I have personally reviewed pertinent reports  CT of chest performed on April 6, 2018  Results are pending  I did personally review and will be reviewing with Dr Knott  Office Spirometry Results:     ESS:    No results found

## 2018-05-14 DIAGNOSIS — D86.9 SARCOIDOSIS: ICD-10-CM

## 2018-05-14 DIAGNOSIS — R06.02 SHORTNESS OF BREATH: ICD-10-CM

## 2018-05-29 ENCOUNTER — APPOINTMENT (OUTPATIENT)
Dept: LAB | Facility: CLINIC | Age: 73
End: 2018-05-29
Payer: MEDICARE

## 2018-05-29 ENCOUNTER — TRANSCRIBE ORDERS (OUTPATIENT)
Dept: LAB | Facility: CLINIC | Age: 73
End: 2018-05-29

## 2018-05-29 DIAGNOSIS — I10 ESSENTIAL HYPERTENSION, MALIGNANT: ICD-10-CM

## 2018-05-29 DIAGNOSIS — E78.00 PURE HYPERCHOLESTEROLEMIA: ICD-10-CM

## 2018-05-29 DIAGNOSIS — E78.00 PURE HYPERCHOLESTEROLEMIA: Primary | ICD-10-CM

## 2018-05-29 LAB
ALBUMIN SERPL BCP-MCNC: 3.4 G/DL (ref 3.5–5)
ALP SERPL-CCNC: 79 U/L (ref 46–116)
ALT SERPL W P-5'-P-CCNC: 45 U/L (ref 12–78)
ANION GAP SERPL CALCULATED.3IONS-SCNC: 6 MMOL/L (ref 4–13)
AST SERPL W P-5'-P-CCNC: 32 U/L (ref 5–45)
BILIRUB SERPL-MCNC: 0.49 MG/DL (ref 0.2–1)
BUN SERPL-MCNC: 19 MG/DL (ref 5–25)
CALCIUM SERPL-MCNC: 8.6 MG/DL (ref 8.3–10.1)
CHLORIDE SERPL-SCNC: 106 MMOL/L (ref 100–108)
CHOLEST SERPL-MCNC: 146 MG/DL (ref 50–200)
CO2 SERPL-SCNC: 26 MMOL/L (ref 21–32)
CREAT SERPL-MCNC: 0.97 MG/DL (ref 0.6–1.3)
ERYTHROCYTE [DISTWIDTH] IN BLOOD BY AUTOMATED COUNT: 14.3 % (ref 11.6–15.1)
GFR SERPL CREATININE-BSD FRML MDRD: 59 ML/MIN/1.73SQ M
GLUCOSE P FAST SERPL-MCNC: 88 MG/DL (ref 65–99)
HCT VFR BLD AUTO: 45.5 % (ref 34.8–46.1)
HDLC SERPL-MCNC: 49 MG/DL (ref 40–60)
HGB BLD-MCNC: 14.8 G/DL (ref 11.5–15.4)
LDLC SERPL CALC-MCNC: 80 MG/DL (ref 0–100)
MCH RBC QN AUTO: 33.2 PG (ref 26.8–34.3)
MCHC RBC AUTO-ENTMCNC: 32.5 G/DL (ref 31.4–37.4)
MCV RBC AUTO: 102 FL (ref 82–98)
NONHDLC SERPL-MCNC: 97 MG/DL
PLATELET # BLD AUTO: 233 THOUSANDS/UL (ref 149–390)
PMV BLD AUTO: 10.5 FL (ref 8.9–12.7)
POTASSIUM SERPL-SCNC: 4 MMOL/L (ref 3.5–5.3)
PROT SERPL-MCNC: 7.1 G/DL (ref 6.4–8.2)
RBC # BLD AUTO: 4.46 MILLION/UL (ref 3.81–5.12)
SODIUM SERPL-SCNC: 138 MMOL/L (ref 136–145)
TRIGL SERPL-MCNC: 86 MG/DL
TSH SERPL DL<=0.05 MIU/L-ACNC: 8.51 UIU/ML (ref 0.36–3.74)
WBC # BLD AUTO: 6.64 THOUSAND/UL (ref 4.31–10.16)

## 2018-05-29 PROCEDURE — 85027 COMPLETE CBC AUTOMATED: CPT | Performed by: INTERNAL MEDICINE

## 2018-05-29 PROCEDURE — 36415 COLL VENOUS BLD VENIPUNCTURE: CPT | Performed by: INTERNAL MEDICINE

## 2018-05-29 PROCEDURE — 84443 ASSAY THYROID STIM HORMONE: CPT

## 2018-05-29 PROCEDURE — 80053 COMPREHEN METABOLIC PANEL: CPT | Performed by: INTERNAL MEDICINE

## 2018-05-29 PROCEDURE — 80061 LIPID PANEL: CPT | Performed by: INTERNAL MEDICINE

## 2018-07-03 ENCOUNTER — 3 MONTH EXAM (OUTPATIENT)
Dept: URBAN - METROPOLITAN AREA CLINIC 27 | Facility: CLINIC | Age: 73
End: 2018-07-03

## 2018-07-03 DIAGNOSIS — H43.812: ICD-10-CM

## 2018-07-03 DIAGNOSIS — H43.89: ICD-10-CM

## 2018-07-03 PROCEDURE — 92134 CPTRZ OPH DX IMG PST SGM RTA: CPT

## 2018-07-03 PROCEDURE — 92012 INTRM OPH EXAM EST PATIENT: CPT

## 2018-07-03 ASSESSMENT — VISUAL ACUITY
OD_CC: 20/25
OS_CC: CF 3FT
OD_PH: 20/20-1

## 2018-07-03 ASSESSMENT — TONOMETRY
OS_IOP_MMHG: 20
OD_IOP_MMHG: 22

## 2018-07-17 ENCOUNTER — FOLLOW UP (OUTPATIENT)
Dept: URBAN - METROPOLITAN AREA CLINIC 27 | Facility: CLINIC | Age: 73
End: 2018-07-17

## 2018-07-17 DIAGNOSIS — H43.812: ICD-10-CM

## 2018-07-17 DIAGNOSIS — H43.89: ICD-10-CM

## 2018-07-17 PROCEDURE — 92012 INTRM OPH EXAM EST PATIENT: CPT

## 2018-07-17 ASSESSMENT — VISUAL ACUITY: OS_CC: CF 2FT

## 2018-07-17 ASSESSMENT — TONOMETRY: OS_IOP_MMHG: 22

## 2018-07-24 ENCOUNTER — OFFICE VISIT (OUTPATIENT)
Dept: PULMONOLOGY | Facility: MEDICAL CENTER | Age: 73
End: 2018-07-24
Payer: MEDICARE

## 2018-07-24 VITALS
OXYGEN SATURATION: 98 % | WEIGHT: 221 LBS | HEART RATE: 68 BPM | DIASTOLIC BLOOD PRESSURE: 86 MMHG | SYSTOLIC BLOOD PRESSURE: 124 MMHG | BODY MASS INDEX: 44.55 KG/M2 | HEIGHT: 59 IN | TEMPERATURE: 97.7 F | RESPIRATION RATE: 12 BRPM

## 2018-07-24 DIAGNOSIS — R06.02 SHORTNESS OF BREATH: ICD-10-CM

## 2018-07-24 DIAGNOSIS — G47.33 OBSTRUCTIVE SLEEP APNEA: ICD-10-CM

## 2018-07-24 DIAGNOSIS — D86.9 SARCOIDOSIS: ICD-10-CM

## 2018-07-24 DIAGNOSIS — R06.02 SOB (SHORTNESS OF BREATH): Primary | ICD-10-CM

## 2018-07-24 PROCEDURE — 99214 OFFICE O/P EST MOD 30 MIN: CPT | Performed by: INTERNAL MEDICINE

## 2018-07-24 PROCEDURE — 94010 BREATHING CAPACITY TEST: CPT | Performed by: INTERNAL MEDICINE

## 2018-07-24 RX ORDER — LEVOTHYROXINE SODIUM 0.03 MG/1
25 TABLET ORAL DAILY
COMMUNITY
End: 2019-10-09

## 2018-07-24 NOTE — PROGRESS NOTES
Assessment/Plan:     Problem List Items Addressed This Visit        Respiratory    Obstructive sleep apnea     Lorie Kawasaki has mild OS A  Diagnostic sleep study done July 2014 showed an AHI of 9  She was not able tolerate CPAP therapy  She is not having any excessive daytime somnolence  I did advise her to try to lose weight            Other    Sarcoidosis     Sarcoidosis is stable  I reviewed CT scan of chest done April 6, 2018  This showed stable mediastinal and bilateral hilar adenopathy  There is some conglomerate fibrosis in the superior segment of the right lower lobe with no change compared to prior CT scans  No at new abnormalities noted  Oscar Balint Spirometry today shows normal lung volumes  She is scheduled for left eyes surgery on August 10  Relevant Orders    Pulse Oximeter    Shortness of breath     Lorie Kawasaki is only has mild exertional dyspnea  Her spirometry today shows normal lung volumes  Room air O2 saturation is normal           Other Visit Diagnoses     SOB (shortness of breath)    -  Primary    Relevant Orders    POCT spirometry (Completed)            Return in about 10 months (around 5/24/2019)  All questions are answered to the patient's satisfaction and understanding  She verbalizes understanding  She is encouraged to call with any further questions or concerns  Portions of the record may have been created with voice recognition software  Occasional wrong word or "sound a like" substitutions may have occurred due to the inherent limitations of voice recognition software  Read the chart carefully and recognize, using context, where substitutions have occurred      Electronically Signed by Sofiya Mayen DO    ______________________________________________________________________    Chief Complaint:   Chief Complaint   Patient presents with    Follow-up    Shortness of Breath     pretty good    Wheezing     occurs once in a while       Patient ID: Lorie Kawasaki is a 68 y o  y o  female has a past medical history of 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[address] ); Sarcoidosis; and SOB (shortness of breath)  7/24/2018  HPI     Farooq Leong presents today for a follow-up visit  She has sarcoidosis and also has a history of obstructive sleep apnea for which she cannot tolerate CPAP therapy  She states she is scheduled for eye surgery  on August 16  She is legally blind in her left eye  She is scheduled to have some type of eye surgery by her retinal specialist   She does take Eliquis and amiodarone because of history of atrial fibrillation  She is not having any shortness of breath with her usual activity  No cough, no weight loss  She denies any nocturnal dyspnea  There is no chronic cough  She has a history of mild HERMANN but could not tolerate CPAP therapy  Sleep study done July 15, 2014 showed AHI of 9  She also has history of CABG done June 2017 and has an ACID device placed December 2017 at [facility]   He does get short of breath with exertional activity  Farooq Leong was diagnosed with sarcoidosis in 2014  Biopsy showed evidence of noncaseating granulomas consistent with this diagnosis  A right supraclavicular ultrasound-guided needle biopsy was done of the lymph node in 2014 revealing this diagnosis  At that time her serum Ace level was elevated 123  LAst ACE level in [facility] 2018 was elevated at 185  Review of Systems   Constitutional: Negative for chills, fever and unexpected weight change  HENT: Negative for congestion, rhinorrhea and sore throat  Eyes: Negative for discharge and redness  Respiratory: Negative for cough and shortness of breath  Cardiovascular: Negative for chest pain, palpitations and leg swelling  Gastrointestinal: Negative for abdominal distention, abdominal pain and nausea  Endocrine: Negative for polydipsia and polyphagia  Genitourinary: Negative for dysuria  Musculoskeletal: Negative for joint swelling and myalgias  Skin: Negative for rash     Neurological: Negative for light-headedness  Smoking history: She reports that she quit smoking about 31 years ago  She has a 60 00 pack-year smoking history  She has never used smokeless tobacco     The following portions of the patient's history were reviewed and updated as appropriate: allergies, current medications, past family history, past medical history, past social history, past surgical history and problem list     Immunization History   Administered Date(s) Administered    Influenza Split High Dose Preservative Free IM 10/01/2014    Influenza TIV (IM) 09/10/2013    Pneumococcal Polysaccharide PPV23 10/01/2014     Current Outpatient Prescriptions   Medication Sig Dispense Refill    apixaban (ELIQUIS) 5 mg Take 5 mg by mouth 2 (two) times a day      atorvastatin (LIPITOR) 10 mg tablet Take 10 mg by mouth      eplerenone (INSPRA) 50 MG tablet   0    furosemide (LASIX) 20 mg tablet   0    levothyroxine 25 mcg tablet Take 25 mcg by mouth daily      metoprolol succinate (TOPROL-XL) 50 mg 24 hr tablet   0    amiodarone 200 mg tablet Take 200 mg by mouth      aspirin 81 MG tablet Take by mouth      losartan (COZAAR) 25 mg tablet   0    prednisoLONE acetate (PRED FORTE) 1 % ophthalmic suspension instill 1 drop into left eye four times a day  0    predniSONE 10 mg tablet Take by mouth      ramipril (ALTACE) 2 5 mg capsule Take 2 5 mg by mouth      warfarin (COUMADIN) 4 mg tablet Take 4 mg by mouth      warfarin (COUMADIN) 6 mg tablet Take 1 tablet by mouth daily       No current facility-administered medications for this visit  Allergies: Patient has no known allergies  Objective:  Vitals:    07/24/18 1116   BP: 124/86   BP Location: Left arm   Patient Position: Sitting   Cuff Size: Extra-Large   Pulse: 68   Resp: 12   Temp: 97 7 °F (36 5 °C)   TempSrc: Oral   SpO2: 98%   Weight: 100 kg (221 lb)   Height: 4' 11" (1 499 m)   Oxygen Therapy  SpO2: 98 %      Wt Readings from Last 3 Encounters: 07/24/18 100 kg (221 lb)   04/10/18 99 8 kg (220 lb)   01/11/18 100 kg (221 lb)     Body mass index is 44 64 kg/m²  Physical Exam   Constitutional: She is oriented to person, place, and time  She appears well-developed and well-nourished  No distress  Obese, no conversational dyspnea   HENT:   Head: Normocephalic  Nose: Nose normal    Mouth/Throat: Oropharynx is clear and moist  No oropharyngeal exudate  Eyes: Conjunctivae are normal  Pupils are equal, round, and reactive to light  Neck: Neck supple  No JVD present  No tracheal deviation present  Cardiovascular: Normal rate, regular rhythm and normal heart sounds  Pulmonary/Chest: Effort normal    Lung sounds are clear   Abdominal: Soft  She exhibits no distension  There is no tenderness  There is no guarding  Musculoskeletal: She exhibits no edema  Lymphadenopathy:     She has no cervical adenopathy  Neurological: She is alert and oriented to person, place, and time  Skin: Skin is warm and dry  No rash noted  Psychiatric: She has a normal mood and affect  Her behavior is normal  Thought content normal        Office Spirometry Results: done today  FVC - 2 11 L  90%  FEV! - 1 52 L  87%  FEV1/FVC% - 72%    Normal lung volumes     I did review her CT scan of chest done April 6, 2018    This showed some stable mediastinal and bilateral hilar adenopathy and some conglomeratet fibrosis the superior segment of the right lower lobe with no change compared to prior CT scan dating back to June 2015

## 2018-07-28 PROBLEM — R06.02 SHORTNESS OF BREATH: Status: ACTIVE | Noted: 2018-07-28

## 2018-07-28 PROBLEM — J43.9 BULLOUS EMPHYSEMA (HCC): Status: RESOLVED | Noted: 2018-04-10 | Resolved: 2018-07-28

## 2018-07-28 PROBLEM — G47.33 OBSTRUCTIVE SLEEP APNEA: Status: ACTIVE | Noted: 2018-07-28

## 2018-07-28 NOTE — ASSESSMENT & PLAN NOTE
Sarcoidosis is stable  I reviewed CT scan of chest done April 6, 2018  This showed stable mediastinal and bilateral hilar adenopathy  There is some conglomerate fibrosis in the superior segment of the right lower lobe with no change compared to prior CT scans  No at new abnormalities noted  Adelina Garrison Spirometry today shows normal lung volumes  She is scheduled for left eyes surgery on August 10

## 2018-07-28 NOTE — ASSESSMENT & PLAN NOTE
Greg Kothari is only has mild exertional dyspnea  Her spirometry today shows normal lung volumes    Room air O2 saturation is normal

## 2018-07-28 NOTE — ASSESSMENT & PLAN NOTE
Amber Annabella has mild OS A  Diagnostic sleep study done July 2014 showed an AHI of 9  She was not able tolerate CPAP therapy  She is not having any excessive daytime somnolence    I did advise her to try to lose weight

## 2018-08-16 ENCOUNTER — SURGERY/PROCEDURE (OUTPATIENT)
Dept: URBAN - METROPOLITAN AREA HOSPITAL 7 | Facility: HOSPITAL | Age: 73
End: 2018-08-16

## 2018-08-16 DIAGNOSIS — H43.89: ICD-10-CM

## 2018-08-16 PROCEDURE — 67036 REMOVAL OF INNER EYE FLUID: CPT

## 2018-08-17 ENCOUNTER — 1 DAY POST-OP (OUTPATIENT)
Dept: URBAN - METROPOLITAN AREA CLINIC 11 | Facility: CLINIC | Age: 73
End: 2018-08-17

## 2018-08-17 DIAGNOSIS — H43.89: ICD-10-CM

## 2018-08-17 DIAGNOSIS — Z98.890: ICD-10-CM

## 2018-08-17 PROCEDURE — 99024 POSTOP FOLLOW-UP VISIT: CPT

## 2018-08-17 ASSESSMENT — TONOMETRY: OS_IOP_MMHG: 30

## 2018-08-21 ENCOUNTER — POST-OP CHECK (OUTPATIENT)
Dept: URBAN - METROPOLITAN AREA CLINIC 27 | Facility: CLINIC | Age: 73
End: 2018-08-21

## 2018-08-21 DIAGNOSIS — Z98.890: ICD-10-CM

## 2018-08-21 DIAGNOSIS — H43.89: ICD-10-CM

## 2018-08-21 PROCEDURE — 99024 POSTOP FOLLOW-UP VISIT: CPT

## 2018-08-21 ASSESSMENT — TONOMETRY: OS_IOP_MMHG: 8

## 2018-08-21 ASSESSMENT — VISUAL ACUITY: OS_CC: 20/400

## 2018-09-11 ENCOUNTER — POST-OP CHECK (OUTPATIENT)
Dept: URBAN - METROPOLITAN AREA CLINIC 27 | Facility: CLINIC | Age: 73
End: 2018-09-11

## 2018-09-11 DIAGNOSIS — H35.372: ICD-10-CM

## 2018-09-11 PROCEDURE — 92134 CPTRZ OPH DX IMG PST SGM RTA: CPT

## 2018-09-11 ASSESSMENT — VISUAL ACUITY: OS_CC: 20/200+1

## 2018-09-11 ASSESSMENT — TONOMETRY: OS_IOP_MMHG: 20

## 2018-10-09 ENCOUNTER — POST-OP CHECK (OUTPATIENT)
Dept: URBAN - METROPOLITAN AREA CLINIC 27 | Facility: CLINIC | Age: 73
End: 2018-10-09

## 2018-10-09 DIAGNOSIS — H43.89: ICD-10-CM

## 2018-10-09 DIAGNOSIS — H35.372: ICD-10-CM

## 2018-10-09 PROCEDURE — 99024 POSTOP FOLLOW-UP VISIT: CPT

## 2018-10-09 ASSESSMENT — TONOMETRY: OS_IOP_MMHG: 16

## 2018-10-09 ASSESSMENT — VISUAL ACUITY: OS_CC: 20/100

## 2018-10-30 ENCOUNTER — UNSCHEDULED FOLLOW UP (OUTPATIENT)
Dept: URBAN - METROPOLITAN AREA CLINIC 27 | Facility: CLINIC | Age: 73
End: 2018-10-30

## 2018-10-30 DIAGNOSIS — H43.89: ICD-10-CM

## 2018-10-30 DIAGNOSIS — H35.372: ICD-10-CM

## 2018-10-30 PROCEDURE — 99024 POSTOP FOLLOW-UP VISIT: CPT

## 2018-10-30 ASSESSMENT — TONOMETRY: OS_IOP_MMHG: 15

## 2018-10-30 ASSESSMENT — VISUAL ACUITY: OS_CC: 20/400

## 2018-11-14 ENCOUNTER — TRANSCRIBE ORDERS (OUTPATIENT)
Dept: LAB | Facility: CLINIC | Age: 73
End: 2018-11-14

## 2018-11-14 ENCOUNTER — APPOINTMENT (OUTPATIENT)
Dept: LAB | Facility: CLINIC | Age: 73
End: 2018-11-14
Payer: MEDICARE

## 2018-11-14 DIAGNOSIS — R06.02 SHORTNESS OF BREATH: ICD-10-CM

## 2018-11-14 DIAGNOSIS — E78.00 PURE HYPERCHOLESTEROLEMIA: ICD-10-CM

## 2018-11-14 DIAGNOSIS — E78.00 PURE HYPERCHOLESTEROLEMIA: Primary | ICD-10-CM

## 2018-11-14 LAB
ALBUMIN SERPL BCP-MCNC: 3.4 G/DL (ref 3.5–5)
ALP SERPL-CCNC: 74 U/L (ref 46–116)
ALT SERPL W P-5'-P-CCNC: 29 U/L (ref 12–78)
ANION GAP SERPL CALCULATED.3IONS-SCNC: 5 MMOL/L (ref 4–13)
AST SERPL W P-5'-P-CCNC: 18 U/L (ref 5–45)
BILIRUB SERPL-MCNC: 0.55 MG/DL (ref 0.2–1)
BUN SERPL-MCNC: 19 MG/DL (ref 5–25)
CALCIUM SERPL-MCNC: 9 MG/DL (ref 8.3–10.1)
CHLORIDE SERPL-SCNC: 105 MMOL/L (ref 100–108)
CHOLEST SERPL-MCNC: 171 MG/DL (ref 50–200)
CO2 SERPL-SCNC: 27 MMOL/L (ref 21–32)
CREAT SERPL-MCNC: 0.99 MG/DL (ref 0.6–1.3)
ERYTHROCYTE [DISTWIDTH] IN BLOOD BY AUTOMATED COUNT: 13 % (ref 11.6–15.1)
GFR SERPL CREATININE-BSD FRML MDRD: 57 ML/MIN/1.73SQ M
GLUCOSE P FAST SERPL-MCNC: 89 MG/DL (ref 65–99)
HCT VFR BLD AUTO: 44.7 % (ref 34.8–46.1)
HDLC SERPL-MCNC: 44 MG/DL (ref 40–60)
HGB BLD-MCNC: 14.7 G/DL (ref 11.5–15.4)
LDLC SERPL CALC-MCNC: 95 MG/DL (ref 0–100)
MCH RBC QN AUTO: 33.1 PG (ref 26.8–34.3)
MCHC RBC AUTO-ENTMCNC: 32.9 G/DL (ref 31.4–37.4)
MCV RBC AUTO: 101 FL (ref 82–98)
NONHDLC SERPL-MCNC: 127 MG/DL
PLATELET # BLD AUTO: 236 THOUSANDS/UL (ref 149–390)
PMV BLD AUTO: 10.4 FL (ref 8.9–12.7)
POTASSIUM SERPL-SCNC: 4.1 MMOL/L (ref 3.5–5.3)
PROT SERPL-MCNC: 7.4 G/DL (ref 6.4–8.2)
RBC # BLD AUTO: 4.44 MILLION/UL (ref 3.81–5.12)
SODIUM SERPL-SCNC: 137 MMOL/L (ref 136–145)
TRIGL SERPL-MCNC: 162 MG/DL
TSH SERPL DL<=0.05 MIU/L-ACNC: 4.45 UIU/ML (ref 0.36–3.74)
WBC # BLD AUTO: 5.3 THOUSAND/UL (ref 4.31–10.16)

## 2018-11-14 PROCEDURE — 80061 LIPID PANEL: CPT | Performed by: INTERNAL MEDICINE

## 2018-11-14 PROCEDURE — 85027 COMPLETE CBC AUTOMATED: CPT | Performed by: INTERNAL MEDICINE

## 2018-11-14 PROCEDURE — 80053 COMPREHEN METABOLIC PANEL: CPT | Performed by: INTERNAL MEDICINE

## 2018-11-14 PROCEDURE — 84443 ASSAY THYROID STIM HORMONE: CPT

## 2018-11-14 PROCEDURE — 36415 COLL VENOUS BLD VENIPUNCTURE: CPT | Performed by: INTERNAL MEDICINE

## 2018-11-27 ENCOUNTER — FOLLOW UP (OUTPATIENT)
Dept: URBAN - METROPOLITAN AREA CLINIC 27 | Facility: CLINIC | Age: 73
End: 2018-11-27

## 2018-11-27 DIAGNOSIS — H43.89: ICD-10-CM

## 2018-11-27 DIAGNOSIS — H43.391: ICD-10-CM

## 2018-11-27 DIAGNOSIS — H35.372: ICD-10-CM

## 2018-11-27 DIAGNOSIS — H25.9: ICD-10-CM

## 2018-11-27 PROCEDURE — 92134 CPTRZ OPH DX IMG PST SGM RTA: CPT

## 2018-11-27 PROCEDURE — 92250 FUNDUS PHOTOGRAPHY W/I&R: CPT

## 2018-11-27 PROCEDURE — 92014 COMPRE OPH EXAM EST PT 1/>: CPT

## 2018-11-27 PROCEDURE — 92235 FLUORESCEIN ANGRPH MLTIFRAME: CPT

## 2018-11-27 ASSESSMENT — TONOMETRY
OD_IOP_MMHG: 21
OS_IOP_MMHG: 20

## 2018-11-27 ASSESSMENT — VISUAL ACUITY
OS_CC: 20/400
OD_CC: 20/25

## 2018-12-17 ENCOUNTER — TELEPHONE (OUTPATIENT)
Dept: PULMONOLOGY | Facility: MEDICAL CENTER | Age: 73
End: 2018-12-17

## 2018-12-17 NOTE — TELEPHONE ENCOUNTER
Would like a letter to excuse her from jury duty   Can you please do this for her?    She said Dr Danielle Tom has done this for her on the past

## 2018-12-26 ENCOUNTER — 3 MONTH EXAM (OUTPATIENT)
Dept: URBAN - METROPOLITAN AREA CLINIC 27 | Facility: CLINIC | Age: 73
End: 2018-12-26

## 2018-12-26 DIAGNOSIS — H43.89: ICD-10-CM

## 2018-12-26 DIAGNOSIS — H35.372: ICD-10-CM

## 2018-12-26 DIAGNOSIS — H43.391: ICD-10-CM

## 2018-12-26 PROCEDURE — 92134 CPTRZ OPH DX IMG PST SGM RTA: CPT

## 2018-12-26 PROCEDURE — 92012 INTRM OPH EXAM EST PATIENT: CPT

## 2018-12-26 ASSESSMENT — TONOMETRY
OD_IOP_MMHG: 21
OS_IOP_MMHG: 19

## 2018-12-26 ASSESSMENT — VISUAL ACUITY
OD_CC: 20/25
OS_CC: 20/160

## 2019-01-09 ENCOUNTER — OFFICE VISIT (OUTPATIENT)
Dept: PULMONOLOGY | Facility: MEDICAL CENTER | Age: 74
End: 2019-01-09
Payer: MEDICARE

## 2019-01-09 VITALS
BODY MASS INDEX: 47.02 KG/M2 | HEART RATE: 61 BPM | TEMPERATURE: 97.3 F | DIASTOLIC BLOOD PRESSURE: 78 MMHG | OXYGEN SATURATION: 97 % | HEIGHT: 58 IN | WEIGHT: 224 LBS | SYSTOLIC BLOOD PRESSURE: 110 MMHG | RESPIRATION RATE: 16 BRPM

## 2019-01-09 DIAGNOSIS — I48.0 PAROXYSMAL ATRIAL FIBRILLATION (HCC): ICD-10-CM

## 2019-01-09 DIAGNOSIS — D86.9 SARCOIDOSIS: ICD-10-CM

## 2019-01-09 DIAGNOSIS — R06.02 SHORTNESS OF BREATH: Primary | ICD-10-CM

## 2019-01-09 PROCEDURE — 99214 OFFICE O/P EST MOD 30 MIN: CPT | Performed by: NURSE PRACTITIONER

## 2019-01-09 NOTE — PATIENT INSTRUCTIONS
U will have a CAT scan a year chest  With contrast   He will also need to have a complete pulmonary function test and lab test is given to you  Follow-up will be in 1 month  You were seen today with Dr Vito Land

## 2019-01-09 NOTE — ASSESSMENT & PLAN NOTE
[facility] has shortness of breath on exertion  I did walk her today 40 ft in her room air oxygen remained at 96%  [facility] did have complete pulmonary function testing done December 31, 2017  Forced vital capacity was 1 84 L or 78% of predicted  FEV1 was 1 43 L or 80% of predicted and obstruction ratio was 78%  Total lung capacity was 96% and residual volume 96  Diffusion capacity was 81 when corrected for alveolar volume  There was no restrictive or obstructive defect  Shortness of breath is likely multifactorial   [facility] does have a history of coronary artery disease  She also has sarcoidosis with stable CT finding of chest since April of 2018  According to patient, now she now exercises approximately 3 times a week at a gym  She does have a  and reports feeling somewhat better

## 2019-01-09 NOTE — PROGRESS NOTES
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 and has the try this  She is on oral prednisone 5 mg daily and also steroid eye drops  She also follows with a retinal specialist   I did discuss this case with Dr Severa Belts  Relevant Orders    CT chest with contrast    Angiotensin converting enzyme    Pulmonary function test    Shortness of breath - Primary     Vish Courtney has shortness of breath on exertion  I did walk her today 40 ft in her room air oxygen remained at 96%  Vish Courtney did have complete pulmonary function testing done December 31, 2017  Forced vital capacity was 1 84 L or 78% of predicted  FEV1 was 1 43 L or 80% of predicted and obstruction ratio was 78%  Total lung capacity was 96% and residual volume 96  Diffusion capacity was 81 when corrected for alveolar volume  There was no restrictive or obstructive defect  Shortness of breath is likely multifactorial   Vish Courtney does have a history of coronary artery disease  She also has sarcoidosis with stable CT finding of chest since April of 2018  According to patient, now she now exercises approximately 3 times a week at a gym  She does have a  and reports feeling somewhat better  HPI;  Vish Courtney has a history of sarcoidosis  Last CT of the chest was done April 6, 2018  It was reviewed by Dr Severa Belts at that time  There is stable mediastinal and bilateral hilar adenopathy    There is also some fibrosis in the superior segment of the right lower lobe with no change compared to prior CT scans  According to patient, she now has 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  She is currently on Pred Forte 4 times daily in the left eye  She is also on 5 g of prednisone daily  Courtney Ham does have sarcoidosis  This was diagnosed in 2014  Biopsy showed evidence of non K caseating granulomas consistent with a diagnosis  A right supraclavicular ultrasound-guided needle biopsy was done of the lymph node revealing this diagnosis  At that time her last serum Ace level was elevated at 1:23 a m  And in March 2018 her Ace level was 185  Della Santos also has history of mild obstructive sleep apnea  She could not tolerate CPAP therapy  Apneic hypopnea index in July of 2014 was 9  She has also had history of CABG done in June 2017 and has id [facility] id device placed  Return in about 4 weeks (around 2/6/2019)  All questions are answered to the patient's satisfaction and understanding  She verbalizes understanding  She is encouraged to call with any further questions or concerns  Portions of the record may have been created with voice recognition software  Occasional wrong word or "sound a like" substitutions may have occurred due to the inherent limitations of voice recognition software  Read the chart carefully and recognize, using context, where substitutions have occurred  Electronically Signed by LILLIANA Echavarria    ______________________________________________________________________    Chief Complaint:   Chief Complaint   Patient presents with   [address] [facility] doctor thinks that the fluid behind her retina may be related to the sarcoidosis   Shortness of Breath       Patient ID: Courtney Ham is a 68 y o  y o  female has a past medical history of 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[address] ); Sarcoidosis; and SOB (shortness of breath)  1/9/2019  Patient presents today for follow-up visit  Shortness of Breath   This is a chronic problem   The current episode started more than 1 year ago  The problem occurs daily  The problem has been waxing and waning  Review of Systems   Respiratory: Positive for shortness of breath  Smoking history: She reports that she quit smoking about 32 years ago  She has a 60 00 pack-year smoking history  She has never used smokeless tobacco     The following portions of the patient's history were reviewed and updated as appropriate: allergies, current medications, past family history, past medical history, past social history, past surgical history and problem list     Immunization History   Administered Date(s) Administered    Influenza Split High Dose Preservative Free IM 10/01/2014    Influenza TIV (IM) 09/10/2013    Pneumococcal Polysaccharide PPV23 10/01/2014     Current Outpatient Prescriptions   Medication Sig Dispense Refill    amiodarone 200 mg tablet Take 200 mg by mouth      apixaban (ELIQUIS) 5 mg Take 5 mg by mouth 2 (two) times a day      aspirin 81 MG tablet Take by mouth      atorvastatin (LIPITOR) 10 mg tablet Take 10 mg by mouth      eplerenone (INSPRA) 50 MG tablet   0    furosemide (LASIX) 20 mg tablet   0    levothyroxine 25 mcg tablet Take 25 mcg by mouth daily      losartan (COZAAR) 25 mg tablet   0    metoprolol succinate (TOPROL-XL) 50 mg 24 hr tablet   0    prednisoLONE acetate (PRED FORTE) 1 % ophthalmic suspension instill 1 drop into left eye four times a day  0    predniSONE 10 mg tablet Take 5 mg by mouth daily        ramipril (ALTACE) 2 5 mg capsule Take 2 5 mg by mouth      warfarin (COUMADIN) 4 mg tablet Take 4 mg by mouth      warfarin (COUMADIN) 6 mg tablet Take 1 tablet by mouth daily       No current facility-administered medications for this visit  Allergies: Patient has no known allergies      Objective:  Vitals:    01/09/19 1115   BP: 110/78   BP Location: Left arm   Patient Position: Sitting   Cuff Size: Adult   Pulse: 61   Resp: 16   Temp: (!) 97 3 °F (36 3 °C)   TempSrc: Tympanic   SpO2: 97%   Weight: 102 kg (224 lb)   Height: 4' 10" (1 473 m)   Oxygen Therapy  SpO2: 97 %    Wt Readings from Last 3 Encounters:   01/09/19 102 kg (224 lb)   07/24/18 100 kg (221 lb)   04/10/18 99 8 kg (220 lb)     Body mass index is 46 82 kg/m²  Physical Exam    Lab Review:   Appointment on 11/14/2018   Component Date Value    TSH 3RD GENERATON 11/14/2018 4 450*   Transcribe Orders on 11/14/2018   Component Date Value    WBC 11/14/2018 5 30     RBC 11/14/2018 4 44     Hemoglobin 11/14/2018 14 7     Hematocrit 11/14/2018 44 7     MCV 11/14/2018 101*    MCH 11/14/2018 33 1     MCHC 11/14/2018 32 9     RDW 11/14/2018 13 0     Platelets 38/71/9915 236     MPV 11/14/2018 10 4     Cholesterol 11/14/2018 171     Triglycerides 11/14/2018 162*    HDL, Direct 11/14/2018 44     LDL Calculated 11/14/2018 95     Non-HDL-Chol (CHOL-HDL) 11/14/2018 127     Sodium 11/14/2018 137     Potassium 11/14/2018 4 1     Chloride 11/14/2018 105     CO2 11/14/2018 27     ANION GAP 11/14/2018 5     BUN 11/14/2018 19     Creatinine 11/14/2018 0 99     Glucose, Fasting 11/14/2018 89     Calcium 11/14/2018 9 0     AST 11/14/2018 18     ALT 11/14/2018 29     Alkaline Phosphatase 11/14/2018 74     Total Protein 11/14/2018 7 4     Albumin 11/14/2018 3 4*    Total Bilirubin 11/14/2018 0 55     eGFR 11/14/2018 57        Diagnostics:  I have personally reviewed pertinent reports  Office Spirometry Results:     ESS:    No results found

## 2019-01-09 NOTE — ASSESSMENT & PLAN NOTE
Patient has a history of atrial fibrillation  She is currently on Eliquis and follows with cardiologist Dr Sebastian Forbes who is from [facility]

## 2019-01-09 NOTE — ASSESSMENT & PLAN NOTE
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

## 2019-01-15 ENCOUNTER — HOSPITAL ENCOUNTER (OUTPATIENT)
Dept: PULMONOLOGY | Facility: HOSPITAL | Age: 74
Discharge: HOME | End: 2019-01-15
Payer: MEDICARE

## 2019-01-15 ENCOUNTER — HOSPITAL ENCOUNTER (OUTPATIENT)
Dept: RADIOLOGY | Facility: HOSPITAL | Age: 74
Discharge: HOME | End: 2019-01-15
Payer: MEDICARE

## 2019-01-15 DIAGNOSIS — D86.9 SARCOIDOSIS: ICD-10-CM

## 2019-01-15 PROCEDURE — 94060 EVALUATION OF WHEEZING: CPT | Performed by: INTERNAL MEDICINE

## 2019-01-15 PROCEDURE — 94726 PLETHYSMOGRAPHY LUNG VOLUMES: CPT

## 2019-01-15 PROCEDURE — 94760 N-INVAS EAR/PLS OXIMETRY 1: CPT

## 2019-01-15 PROCEDURE — 94060 EVALUATION OF WHEEZING: CPT

## 2019-01-15 PROCEDURE — 94729 DIFFUSING CAPACITY: CPT | Performed by: INTERNAL MEDICINE

## 2019-01-15 PROCEDURE — 94726 PLETHYSMOGRAPHY LUNG VOLUMES: CPT | Performed by: INTERNAL MEDICINE

## 2019-01-15 PROCEDURE — 71260 CT THORAX DX C+: CPT

## 2019-01-15 PROCEDURE — 94729 DIFFUSING CAPACITY: CPT

## 2019-01-15 RX ORDER — ALBUTEROL SULFATE 2.5 MG/3ML
2.5 SOLUTION RESPIRATORY (INHALATION) ONCE
Status: DISCONTINUED | OUTPATIENT
Start: 2019-01-15 | End: 2019-01-19 | Stop reason: HOSPADM

## 2019-01-15 RX ADMIN — IOHEXOL 85 ML: 350 INJECTION, SOLUTION INTRAVENOUS at 08:07

## 2019-01-17 ENCOUNTER — APPOINTMENT (OUTPATIENT)
Dept: LAB | Facility: CLINIC | Age: 74
End: 2019-01-17
Payer: MEDICARE

## 2019-01-17 ENCOUNTER — TRANSCRIBE ORDERS (OUTPATIENT)
Dept: LAB | Facility: CLINIC | Age: 74
End: 2019-01-17

## 2019-01-17 DIAGNOSIS — R60.0 LOCALIZED EDEMA: ICD-10-CM

## 2019-01-17 DIAGNOSIS — M86.9 SAPHO SYNDROME (HCC): ICD-10-CM

## 2019-01-17 DIAGNOSIS — L70.9 SAPHO SYNDROME (HCC): ICD-10-CM

## 2019-01-17 DIAGNOSIS — I25.10 DISEASE OF CARDIOVASCULAR SYSTEM: ICD-10-CM

## 2019-01-17 DIAGNOSIS — D86.9 SARCOIDOSIS: ICD-10-CM

## 2019-01-17 DIAGNOSIS — M85.80 SAPHO SYNDROME (HCC): ICD-10-CM

## 2019-01-17 DIAGNOSIS — Z01.810 PRE-OPERATIVE CARDIOVASCULAR EXAMINATION, ICD IN PLACE: ICD-10-CM

## 2019-01-17 DIAGNOSIS — Z00.00 ROUTINE GENERAL MEDICAL EXAMINATION AT A HEALTH CARE FACILITY: Primary | ICD-10-CM

## 2019-01-17 DIAGNOSIS — Z95.810 PRE-OPERATIVE CARDIOVASCULAR EXAMINATION, ICD IN PLACE: ICD-10-CM

## 2019-01-17 DIAGNOSIS — M65.9 SAPHO SYNDROME (HCC): ICD-10-CM

## 2019-01-17 DIAGNOSIS — D86.0 PULMONARY SARCOIDOSIS (HCC): ICD-10-CM

## 2019-01-17 DIAGNOSIS — I27.20 PROGRESSIVE PULMONARY HYPERTENSION (HCC): ICD-10-CM

## 2019-01-17 DIAGNOSIS — L40.3 SAPHO SYNDROME (HCC): ICD-10-CM

## 2019-01-17 DIAGNOSIS — E55.9 AVITAMINOSIS D: ICD-10-CM

## 2019-01-17 DIAGNOSIS — Z00.00 ROUTINE GENERAL MEDICAL EXAMINATION AT A HEALTH CARE FACILITY: ICD-10-CM

## 2019-01-17 LAB
25(OH)D3 SERPL-MCNC: 20.3 NG/ML (ref 30–100)
ALBUMIN SERPL BCP-MCNC: 3.3 G/DL (ref 3.5–5)
ALP SERPL-CCNC: 64 U/L (ref 46–116)
ALT SERPL W P-5'-P-CCNC: 32 U/L (ref 12–78)
ANION GAP SERPL CALCULATED.3IONS-SCNC: 6 MMOL/L (ref 4–13)
AST SERPL W P-5'-P-CCNC: 18 U/L (ref 5–45)
BASOPHILS # BLD AUTO: 0.04 THOUSANDS/ΜL (ref 0–0.1)
BASOPHILS NFR BLD AUTO: 1 % (ref 0–1)
BILIRUB SERPL-MCNC: 0.48 MG/DL (ref 0.2–1)
BUN SERPL-MCNC: 19 MG/DL (ref 5–25)
CALCIUM SERPL-MCNC: 9 MG/DL (ref 8.3–10.1)
CHLORIDE SERPL-SCNC: 106 MMOL/L (ref 100–108)
CHOLEST SERPL-MCNC: 160 MG/DL (ref 50–200)
CO2 SERPL-SCNC: 26 MMOL/L (ref 21–32)
CREAT SERPL-MCNC: 0.9 MG/DL (ref 0.6–1.3)
EOSINOPHIL # BLD AUTO: 0.24 THOUSAND/ΜL (ref 0–0.61)
EOSINOPHIL NFR BLD AUTO: 5 % (ref 0–6)
ERYTHROCYTE [DISTWIDTH] IN BLOOD BY AUTOMATED COUNT: 13.3 % (ref 11.6–15.1)
GFR SERPL CREATININE-BSD FRML MDRD: 64 ML/MIN/1.73SQ M
GLUCOSE P FAST SERPL-MCNC: 78 MG/DL (ref 65–99)
HCT VFR BLD AUTO: 41.1 % (ref 34.8–46.1)
HDLC SERPL-MCNC: 48 MG/DL (ref 40–60)
HGB BLD-MCNC: 13.2 G/DL (ref 11.5–15.4)
IMM GRANULOCYTES # BLD AUTO: 0.02 THOUSAND/UL (ref 0–0.2)
IMM GRANULOCYTES NFR BLD AUTO: 0 % (ref 0–2)
LDLC SERPL CALC-MCNC: 88 MG/DL (ref 0–100)
LYMPHOCYTES # BLD AUTO: 0.4 THOUSANDS/ΜL (ref 0.6–4.47)
LYMPHOCYTES NFR BLD AUTO: 8 % (ref 14–44)
MAGNESIUM SERPL-MCNC: 2.3 MG/DL (ref 1.6–2.6)
MCH RBC QN AUTO: 33.2 PG (ref 26.8–34.3)
MCHC RBC AUTO-ENTMCNC: 32.1 G/DL (ref 31.4–37.4)
MCV RBC AUTO: 103 FL (ref 82–98)
MONOCYTES # BLD AUTO: 0.67 THOUSAND/ΜL (ref 0.17–1.22)
MONOCYTES NFR BLD AUTO: 14 % (ref 4–12)
NEUTROPHILS # BLD AUTO: 3.61 THOUSANDS/ΜL (ref 1.85–7.62)
NEUTS SEG NFR BLD AUTO: 72 % (ref 43–75)
NONHDLC SERPL-MCNC: 112 MG/DL
NRBC BLD AUTO-RTO: 0 /100 WBCS
PLATELET # BLD AUTO: 223 THOUSANDS/UL (ref 149–390)
PMV BLD AUTO: 10.2 FL (ref 8.9–12.7)
POTASSIUM SERPL-SCNC: 3.8 MMOL/L (ref 3.5–5.3)
PROT SERPL-MCNC: 6.6 G/DL (ref 6.4–8.2)
RBC # BLD AUTO: 3.98 MILLION/UL (ref 3.81–5.12)
SODIUM SERPL-SCNC: 138 MMOL/L (ref 136–145)
TRIGL SERPL-MCNC: 118 MG/DL
TSH SERPL DL<=0.05 MIU/L-ACNC: 4.22 UIU/ML (ref 0.36–3.74)
WBC # BLD AUTO: 4.98 THOUSAND/UL (ref 4.31–10.16)

## 2019-01-17 PROCEDURE — 82164 ANGIOTENSIN I ENZYME TEST: CPT

## 2019-01-17 PROCEDURE — 80061 LIPID PANEL: CPT | Performed by: FAMILY MEDICINE

## 2019-01-17 PROCEDURE — 82306 VITAMIN D 25 HYDROXY: CPT | Performed by: FAMILY MEDICINE

## 2019-01-17 PROCEDURE — 83735 ASSAY OF MAGNESIUM: CPT | Performed by: FAMILY MEDICINE

## 2019-01-17 PROCEDURE — 85025 COMPLETE CBC W/AUTO DIFF WBC: CPT | Performed by: FAMILY MEDICINE

## 2019-01-17 PROCEDURE — 36415 COLL VENOUS BLD VENIPUNCTURE: CPT

## 2019-01-17 PROCEDURE — 84443 ASSAY THYROID STIM HORMONE: CPT | Performed by: FAMILY MEDICINE

## 2019-01-17 PROCEDURE — 80053 COMPREHEN METABOLIC PANEL: CPT

## 2019-01-18 LAB — ACE SERPL-CCNC: 93 U/L (ref 14–82)

## 2019-02-06 ENCOUNTER — OFFICE VISIT (OUTPATIENT)
Dept: PULMONOLOGY | Facility: MEDICAL CENTER | Age: 74
End: 2019-02-06
Payer: MEDICARE

## 2019-02-06 VITALS
TEMPERATURE: 98.4 F | WEIGHT: 223 LBS | RESPIRATION RATE: 16 BRPM | OXYGEN SATURATION: 96 % | DIASTOLIC BLOOD PRESSURE: 70 MMHG | HEIGHT: 59 IN | SYSTOLIC BLOOD PRESSURE: 112 MMHG | BODY MASS INDEX: 44.96 KG/M2 | HEART RATE: 78 BPM

## 2019-02-06 DIAGNOSIS — I48.0 PAROXYSMAL ATRIAL FIBRILLATION (HCC): ICD-10-CM

## 2019-02-06 DIAGNOSIS — D86.9 SARCOIDOSIS: Primary | ICD-10-CM

## 2019-02-06 DIAGNOSIS — J43.2 CENTRILOBULAR EMPHYSEMA (HCC): ICD-10-CM

## 2019-02-06 PROCEDURE — 99214 OFFICE O/P EST MOD 30 MIN: CPT | Performed by: NURSE PRACTITIONER

## 2019-02-06 RX ORDER — PREDNISONE 1 MG/1
5 TABLET ORAL 2 TIMES DAILY
Refills: 0 | COMMUNITY
Start: 2018-12-19 | End: 2019-10-09

## 2019-02-06 RX ORDER — AMOXICILLIN 500 MG/1
CAPSULE ORAL
Refills: 0 | COMMUNITY
Start: 2019-01-21 | End: 2019-02-06 | Stop reason: ALTCHOICE

## 2019-02-06 RX ORDER — METOPROLOL SUCCINATE 50 MG/1
50 TABLET, EXTENDED RELEASE ORAL DAILY
COMMUNITY
End: 2021-03-19 | Stop reason: SDUPTHER

## 2019-02-06 RX ORDER — TRIAMCINOLONE ACETONIDE 1 MG/G
CREAM TOPICAL
Refills: 0 | COMMUNITY
Start: 2018-12-06 | End: 2019-10-09

## 2019-02-06 RX ORDER — FUROSEMIDE 40 MG/1
40 TABLET ORAL DAILY
Refills: 0 | COMMUNITY
Start: 2018-11-12

## 2019-02-06 RX ORDER — AMIODARONE HYDROCHLORIDE 200 MG/1
200 TABLET ORAL DAILY
COMMUNITY
End: 2019-02-06 | Stop reason: ALTCHOICE

## 2019-02-06 RX ORDER — LEVOTHYROXINE SODIUM 0.05 MG/1
50 TABLET ORAL DAILY
Refills: 0 | COMMUNITY
Start: 2019-01-31 | End: 2020-04-29 | Stop reason: SDUPTHER

## 2019-02-06 RX ORDER — PENICILLIN V POTASSIUM 500 MG/1
TABLET ORAL
Refills: 0 | COMMUNITY
Start: 2019-01-07 | End: 2021-06-14 | Stop reason: ALTCHOICE

## 2019-02-06 RX ORDER — HYDROCODONE BITARTRATE AND ACETAMINOPHEN 5; 325 MG/1; MG/1
1 TABLET ORAL
Refills: 0 | COMMUNITY
Start: 2019-01-21 | End: 2019-10-09

## 2019-02-06 RX ORDER — RAMIPRIL 2.5 MG/1
2.5 CAPSULE ORAL DAILY
COMMUNITY
End: 2019-10-09

## 2019-02-06 RX ORDER — ATORVASTATIN CALCIUM 10 MG/1
10 TABLET, FILM COATED ORAL DAILY
COMMUNITY
End: 2019-10-09

## 2019-02-06 RX ORDER — WARFARIN SODIUM 4 MG/1
4 TABLET ORAL DAILY
COMMUNITY
End: 2019-02-06 | Stop reason: ALTCHOICE

## 2019-02-06 RX ORDER — EPLERENONE 50 MG/1
50 TABLET, FILM COATED ORAL DAILY
COMMUNITY
End: 2019-10-09

## 2019-02-06 RX ORDER — FUROSEMIDE 20 MG/1
20 TABLET ORAL 3 TIMES DAILY
COMMUNITY
End: 2019-02-06 | Stop reason: ALTCHOICE

## 2019-02-06 RX ORDER — TINIDAZOLE 500 MG/1
2000 TABLET ORAL DAILY
Refills: 0 | COMMUNITY
Start: 2018-12-10

## 2019-02-06 RX ORDER — SACUBITRIL AND VALSARTAN 24; 26 MG/1; MG/1
TABLET, FILM COATED ORAL
Refills: 0 | COMMUNITY
Start: 2019-01-06 | End: 2021-06-14 | Stop reason: ALTCHOICE

## 2019-02-06 NOTE — ASSESSMENT & PLAN NOTE
Patient had re-evaluation of sarcoidosis  She had a CT of the chest with IV contrast and complete pulmonary function test   Her last CT of the chest was done in April 6, 2018  Stable mediastinal adenopathy and chronic pulmonary opacities were noted in keeping with patient's history of sarcoidosis  There was mild centrilobular emphysematous changes  And chronic bilateral infiltrates more prominent the right lower lobe unchanged from previous study  There were no new pulmonary findings  Stable mediastinal and bilateral hilar adenopathy was noted  The previously measured, dominant 24 x 15 mm paraesophageal lymph node was unchanged in size  I did review images with Dr Tucker Maxwell  Additionally she did have a complete pulmonary function test   Forced vital capacity was stable at 2 3 C year 0 L or 94% of predicted FEV1 was 1 61 L or 90% and obstruction ratio 74  Residual volume was 91% of predicted total lung capacity 96  And diffusion capacity when adjusted for alveolar volume was 63%  Patient also had an ACE level drawn  Was slightly elevated at 93, in March 2018 was 185  At this time her sarcoidosis is stable  It is not progressed nor she symptomatic  She currently is on prednisone 5 mg p o  B i d  That was ordered by her her ophthalmologist   According to patient she would like to try using 1 pill daily or 5 mg   I would advise her to confer with her ophthalmologist

## 2019-02-06 NOTE — ASSESSMENT & PLAN NOTE
Christopher Jose has a previous history of mild obstructive sleep apnea  She had a sleep study done July 2014 that showed apneic hypopnea index of 9  She was not able to tolerate CPAP  She is having excessive daytime somnolence but defers any treatment

## 2019-02-06 NOTE — ASSESSMENT & PLAN NOTE
Stacy Yu has mild centrilobular emphysema  This was noted on her CT of the chest that was done in January 2019  She does not use any maintenance inhaler but is going to the gym on a daily basis  She is however on prednisone 5 mg daily for her sarcoidosis that has affected her vision  This was ordered by her ophthalmologist   She did have complete pulmonary function test that were done January 15, 2019  Forced vital capacity was stable at 94% of predicted FEV1 was 1 79 L or 90% of predicted obstruction ratio was 74  Residual volume is 91% and diffusion capacity mildly decreased at 63% when corrected for alveolar volume  Flow volume loop was normal   Stacy Yu was a former smoker smoking for approximately 20 years    He quit 32 years ago

## 2019-02-06 NOTE — ASSESSMENT & PLAN NOTE
Terry Fraser has history of atrial fibrillation  She is currently on anticoagulation, Eliquis and follows with her cardiologist at [address] [place] [address]  His name is Dr Patricia Benavides

## 2019-02-06 NOTE — PROGRESS NOTES
Assessment/Plan:     Problem List Items Addressed This Visit        Respiratory    Centrilobular emphysema ([address] )     Buster Puckett has mild centrilobular emphysema  This was noted on her CT of the chest that was done in January 2019  She does not use any maintenance inhaler but is going to the gym on a daily basis  She is however on prednisone 5 mg daily for her sarcoidosis that has affected her vision  This was ordered by her ophthalmologist   She did have complete pulmonary function test that were done January 15, 2019  Forced vital capacity was stable at 94% of predicted FEV1 was 1 79 L or 90% of predicted obstruction ratio was 74  Residual volume is 91% and diffusion capacity mildly decreased at 63% when corrected for alveolar volume  Flow volume loop was normal   Buster Puckett was a former smoker smoking for approximately 20 years  He quit 32 years ago           Relevant Medications    predniSONE 5 mg tablet       Cardiovascular and Mediastinum    Paroxysmal atrial fibrillation (HCC)     Buster Puckett has history of atrial fibrillation  She is currently on anticoagulation, Eliquis and follows with her cardiologist at [address] [place] [address]  His name is Dr Wen Salguero  Relevant Medications    metoprolol succinate (TOPROL-XL) 50 mg 24 hr tablet    ENTRESTO 24-26 MG TABS       Other    Sarcoidosis - Primary     Patient had re-evaluation of sarcoidosis  She had a CT of the chest with IV contrast and complete pulmonary function test   Her last CT of the chest was done in April 6, 2018  Stable mediastinal adenopathy and chronic pulmonary opacities were noted in keeping with patient's history of sarcoidosis  There was mild centrilobular emphysematous changes  And chronic bilateral infiltrates more prominent the right lower lobe unchanged from previous study  There were no new pulmonary findings  Stable mediastinal and bilateral hilar adenopathy was noted    The previously measured, dominant 24 x 15 mm paraesophageal lymph node was unchanged in size  I did review images with Dr Albina Byrd  Additionally she did have a complete pulmonary function test   Forced vital capacity was stable at 2 3 C year 0 L or 94% of predicted FEV1 was 1 61 L or 90% and obstruction ratio 74  Residual volume was 91% of predicted total lung capacity 96  And diffusion capacity when adjusted for alveolar volume was 63%  Patient also had an ACE level drawn  Was slightly elevated at 93, in March 2018 was 185  At this time her sarcoidosis is stable  It is not progressed nor she symptomatic  She currently is on prednisone 5 mg p o  B i d  That was ordered by her her ophthalmologist   According to patient she would like to try using 1 pill daily or 5 mg  I would advise her to confer with her ophthalmologist                  GRIS José is here today for follow-up  He was last seen in the office January 9, 2019  She has a history of sarcoidosis and follows with a retinal specialist   Apparently she has progressive 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  Last complete pulmonary function test was November 1, 2017  Forced vital capacity was 1 84 L or 78% of predicted, FEV1 was 1 43 L or 80% of predicted and obstruction ratio was 78  Her residual volume was 96% of predicted diffusion capacity was 52 and corrected for alveolar volume was 81  There was no restrictive or obstructive defect and diminished diffusion capacity was normal when corrected for alveolar ventilation  Return in about 6 months (around 8/6/2019)  All questions are answered to the patient's satisfaction and understanding  She verbalizes understanding  She is encouraged to call with any further questions or concerns  Portions of the record may have been created with voice recognition software  Occasional wrong word or "sound a like" substitutions may have occurred due to the inherent limitations of voice recognition software    Read the chart carefully and recognize, using context, where substitutions have occurred  Electronically Signed by LILLIANA Marc    ______________________________________________________________________    Chief Complaint:   Chief Complaint   Patient presents with    Follow-up       Patient ID: Elinor Cabrera is a 68 y o  y o  female has a past medical history of 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[address] ); Sarcoidosis; and SOB (shortness of breath)  2/6/2019  Patient presents today for follow-up visit  Shortness of Breath   This is a chronic problem  The current episode started more than 1 year ago  The problem occurs daily  The problem has been unchanged  The patient has no known risk factors (Patient is on Eliquis for atrial fibrillation) for DVT/PE  She has tried rest for the symptoms  The treatment provided mild relief  Her past medical history is significant for COPD  Review of Systems   Constitutional: Positive for fatigue  HENT: Negative  Eyes: Positive for visual disturbance  Legally blind in left eye   Respiratory: Positive for shortness of breath  Cardiovascular: Negative  Gastrointestinal: Negative  Endocrine: Negative  Genitourinary: Negative  Musculoskeletal: Negative  Allergic/Immunologic: Negative  Neurological: Negative  Hematological: Negative  Psychiatric/Behavioral: Negative  Smoking history: She reports that she quit smoking about 32 years ago  She has a 60 00 pack-year smoking history   She has never used smokeless tobacco     The following portions of the patient's history were reviewed and updated as appropriate: allergies, current medications, past family history, past medical history, past social history, past surgical history and problem list     Immunization History   Administered Date(s) Administered    Influenza Split High Dose Preservative Free IM 10/01/2014    Influenza TIV (IM) 09/10/2013    Pneumococcal Polysaccharide PPV23 10/01/2014     Current Outpatient Prescriptions   Medication Sig Dispense Refill  apixaban (ELIQUIS) 5 mg Take 5 mg by mouth 2 (two) times a day      atorvastatin (LIPITOR) 10 mg tablet Take 10 mg by mouth daily      furosemide (LASIX) 40 mg tablet Take 40 mg by mouth daily  0    levothyroxine 50 mcg tablet Take 50 mcg by mouth daily  0    metoprolol succinate (TOPROL-XL) 50 mg 24 hr tablet   0    prednisoLONE acetate (PRED FORTE) 1 % ophthalmic suspension instill 1 drop into left eye four times a day  0    predniSONE 5 mg tablet Take 5 mg by mouth 2 (two) times a day  0    triamcinolone (KENALOG) 0 1 % cream APPLY A THIN LAYER TO THE AFFECTED AREA TWICE DAILY IF NEEDED AVOID USE ON FACE  0    aspirin 81 MG tablet Take by mouth      atorvastatin (LIPITOR) 10 mg tablet Take 10 mg by mouth      ENTRESTO 24-26 MG TABS   0    eplerenone (INSPRA) 50 MG tablet Take 50 mg by mouth daily      furosemide (LASIX) 20 mg tablet   0    HYDROcodone-acetaminophen (NORCO) 5-325 mg per tablet Take 1 tablet by mouth every 4 to 6 hours if needed for pain  0    levothyroxine 25 mcg tablet Take 25 mcg by mouth daily      losartan (COZAAR) 25 mg tablet   0    metoprolol succinate (TOPROL-XL) 50 mg 24 hr tablet Take 50 mg by mouth daily      penicillin V potassium (VEETID) 500 mg tablet TAKE 1 TABLET BY MOUTH 4 TIMES A DAY UNTIL FINISHED  0    predniSONE 10 mg tablet Take 5 mg by mouth daily        ramipril (ALTACE) 2 5 mg capsule Take 2 5 mg by mouth      ramipril (ALTACE) 2 5 mg capsule Take 2 5 mg by mouth daily      tinidazole (TINDAMAX) 500 MG tablet Take 2,000 mg by mouth daily  0     No current facility-administered medications for this visit  Allergies: Patient has no known allergies  Objective:  Vitals:    02/06/19 1105   BP: 112/70   BP Location: Left arm   Patient Position: Sitting   Cuff Size: Large   Pulse: 78   Resp: 16   Temp: 98 4 °F (36 9 °C)   TempSrc: Tympanic   SpO2: 96%   Weight: 101 kg (223 lb)   Height: 4' 10 5" (1 486 m)   Oxygen Therapy  SpO2: 96 %      Wt Readings from Last 3 Encounters:   02/06/19 101 kg (223 lb)   01/09/19 102 kg (224 lb)   07/24/18 100 kg (221 lb)     Body mass index is 45 81 kg/m²  Physical Exam   Constitutional: She is oriented to person, place, and time  She appears well-developed and well-nourished  Morbid obesity   HENT:   Head: Normocephalic and atraumatic  Mallampati 3   Eyes: Pupils are equal, round, and reactive to light  Neck: Normal range of motion  Neck supple  Cardiovascular: Normal rate and regular rhythm  Pulmonary/Chest: Effort normal    Abdominal: Soft  Musculoskeletal: Normal range of motion  Neurological: She is alert and oriented to person, place, and time  Skin: Skin is warm and dry  Psychiatric: She has a normal mood and affect   Her behavior is normal  Thought content normal        Lab Review:   Appointment on 01/17/2019   Component Date Value    Angio Convert Enzyme 01/17/2019 93*    Sodium 01/17/2019 138     Potassium 01/17/2019 3 8     Chloride 01/17/2019 106     CO2 01/17/2019 26     ANION GAP 01/17/2019 6     BUN 01/17/2019 19     Creatinine 01/17/2019 0 90     Glucose, Fasting 01/17/2019 78     Calcium 01/17/2019 9 0     AST 01/17/2019 18     ALT 01/17/2019 32     Alkaline Phosphatase 01/17/2019 64     Total Protein 01/17/2019 6 6     Albumin 01/17/2019 3 3*    Total Bilirubin 01/17/2019 0 48     eGFR 01/17/2019 64    Transcribe Orders on 01/17/2019   Component Date Value    Magnesium 01/17/2019 2 3     TSH 3RD GENERATON 01/17/2019 4 220*    WBC 01/17/2019 4 98     RBC 01/17/2019 3 98     Hemoglobin 01/17/2019 13 2     Hematocrit 01/17/2019 41 1     MCV 01/17/2019 103*    MCH 01/17/2019 33 2     MCHC 01/17/2019 32 1     RDW 01/17/2019 13 3     MPV 01/17/2019 10 2     Platelets 53/53/4705 223     nRBC 01/17/2019 0     Neutrophils Relative 01/17/2019 72     Immat GRANS % 01/17/2019 0     Lymphocytes Relative 01/17/2019 8*    Monocytes Relative 01/17/2019 14*    Eosinophils Relative 01/17/2019 5     Basophils Relative 01/17/2019 1     Neutrophils Absolute 01/17/2019 3 61     Immature Grans Absolute 01/17/2019 0 02     Lymphocytes Absolute 01/17/2019 0 40*    Monocytes Absolute 01/17/2019 0 67     Eosinophils Absolute 01/17/2019 0 24     Basophils Absolute 01/17/2019 0 04     Vit D, 25-Hydroxy 01/17/2019 20 3*    Cholesterol 01/17/2019 160     Triglycerides 01/17/2019 118     HDL, Direct 01/17/2019 48     LDL Calculated 01/17/2019 88     Non-HDL-Chol (CHOL-HDL) 01/17/2019 112    Appointment on 11/14/2018   Component Date Value    TSH 3RD GENERATON 11/14/2018 4 450*   Transcribe Orders on 11/14/2018   Component Date Value    WBC 11/14/2018 5 30     RBC 11/14/2018 4 44     Hemoglobin 11/14/2018 14 7     Hematocrit 11/14/2018 44 7     MCV 11/14/2018 101*    MCH 11/14/2018 33 1     MCHC 11/14/2018 32 9     RDW 11/14/2018 13 0     Platelets 83/13/9232 236     MPV 11/14/2018 10 4     Cholesterol 11/14/2018 171     Triglycerides 11/14/2018 162*    HDL, Direct 11/14/2018 44     LDL Calculated 11/14/2018 95     Non-HDL-Chol (CHOL-HDL) 11/14/2018 127     Sodium 11/14/2018 137     Potassium 11/14/2018 4 1     Chloride 11/14/2018 105     CO2 11/14/2018 27     ANION GAP 11/14/2018 5     BUN 11/14/2018 19     Creatinine 11/14/2018 0 99     Glucose, Fasting 11/14/2018 89     Calcium 11/14/2018 9 0     AST 11/14/2018 18     ALT 11/14/2018 29     Alkaline Phosphatase 11/14/2018 74     Total Protein 11/14/2018 7 4     Albumin 11/14/2018 3 4*    Total Bilirubin 11/14/2018 0 55     eGFR 11/14/2018 57        Diagnostics:  I have personally reviewed pertinent films in PACS    Office Spirometry Results:     ESS:    Ct Chest With Contrast    Result Date: 1/17/2019  Narrative: CT CHEST WITH IV CONTRAST INDICATION:   D86 9: Sarcoidosis, unspecified  Follow-up of sarcoidosis  History of emphysema  COMPARISON:  CT chest 4/6/2018   TECHNIQUE: CT examination of the chest was performed  Axial, sagittal, and coronal 2D reformatted images were created from the source data and submitted for interpretation  Radiation dose length product (DLP) for this visit:  410 72 mGy-cm   This examination, like all CT scans performed in the [facility], was performed utilizing techniques to minimize radiation dose exposure, including the use of iterative  reconstruction and automated exposure control  IV Contrast:  85 mL of iohexol (OMNIPAQUE) FINDINGS: LUNGS:  Stable mild centrilobular emphysematous changes  Chronic bilateral infiltrates most prominent in the right lower lobe unchanged from the prior study  Scattered areas of stable discoid scarring  No new pulmonary findings  No endotracheal or endobronchial lesion PLEURA:  Unremarkable  HEART/GREAT VESSELS:  Stable cardiomegaly  Dual-lead pacer  MEDIASTINUM AND RUFINO:  Stable mediastinal and bilateral hilar hilar adenopathy  The previously measured, dominant 24 x 15 mm paraesophageal lymph node is unchanged in size  CHEST WALL AND LOWER NECK:   Unremarkable  VISUALIZED STRUCTURES IN THE UPPER ABDOMEN:  Partially imaged cholelithiasis without pericholecystic inflammatory changes  OSSEOUS STRUCTURES:  No acute fracture or destructive osseous lesion  Impression: Stable mediastinal adenopathy and chronic pulmonary opacities in keeping with this patient's history of sarcoid   Stable mild centrilobular pulmonary emphysema Workstation performed: [id number]

## 2019-02-19 ENCOUNTER — FOLLOW UP (OUTPATIENT)
Dept: URBAN - METROPOLITAN AREA CLINIC 27 | Facility: CLINIC | Age: 74
End: 2019-02-19

## 2019-02-19 DIAGNOSIS — H35.372: ICD-10-CM

## 2019-02-19 DIAGNOSIS — H43.89: ICD-10-CM

## 2019-02-19 PROCEDURE — 92134 CPTRZ OPH DX IMG PST SGM RTA: CPT

## 2019-02-19 PROCEDURE — 92012 INTRM OPH EXAM EST PATIENT: CPT

## 2019-02-19 ASSESSMENT — VISUAL ACUITY
OD_CC: 20/20-1
OS_CC: 20/400

## 2019-02-19 ASSESSMENT — TONOMETRY
OS_IOP_MMHG: 18
OD_IOP_MMHG: 18

## 2019-04-30 ENCOUNTER — FOLLOW UP (OUTPATIENT)
Dept: URBAN - METROPOLITAN AREA CLINIC 27 | Facility: CLINIC | Age: 74
End: 2019-04-30

## 2019-04-30 DIAGNOSIS — H25.9: ICD-10-CM

## 2019-04-30 DIAGNOSIS — H43.391: ICD-10-CM

## 2019-04-30 DIAGNOSIS — H43.89: ICD-10-CM

## 2019-04-30 DIAGNOSIS — H35.372: ICD-10-CM

## 2019-04-30 PROCEDURE — 92134 CPTRZ OPH DX IMG PST SGM RTA: CPT

## 2019-04-30 PROCEDURE — 92014 COMPRE OPH EXAM EST PT 1/>: CPT

## 2019-04-30 PROCEDURE — 92235 FLUORESCEIN ANGRPH MLTIFRAME: CPT

## 2019-04-30 PROCEDURE — 92250 FUNDUS PHOTOGRAPHY W/I&R: CPT

## 2019-04-30 ASSESSMENT — TONOMETRY
OD_IOP_MMHG: 19
OS_IOP_MMHG: 17

## 2019-04-30 ASSESSMENT — VISUAL ACUITY
OD_CC: 20/25
OS_CC: CF 3FT

## 2019-06-24 ENCOUNTER — TRANSCRIBE ORDERS (OUTPATIENT)
Dept: LAB | Facility: CLINIC | Age: 74
End: 2019-06-24

## 2019-06-24 ENCOUNTER — APPOINTMENT (OUTPATIENT)
Dept: LAB | Facility: CLINIC | Age: 74
End: 2019-06-24
Payer: MEDICARE

## 2019-06-24 DIAGNOSIS — H25.042 POSTERIOR SUBCAPSULAR POLAR AGE-RELATED CATARACT OF LEFT EYE: Primary | ICD-10-CM

## 2019-06-24 LAB
ANION GAP SERPL CALCULATED.3IONS-SCNC: 8 MMOL/L (ref 4–13)
BUN SERPL-MCNC: 19 MG/DL (ref 5–25)
CALCIUM SERPL-MCNC: 9.2 MG/DL (ref 8.3–10.1)
CHLORIDE SERPL-SCNC: 103 MMOL/L (ref 100–108)
CO2 SERPL-SCNC: 27 MMOL/L (ref 21–32)
CREAT SERPL-MCNC: 1.01 MG/DL (ref 0.6–1.3)
GFR SERPL CREATININE-BSD FRML MDRD: 55 ML/MIN/1.73SQ M
GLUCOSE SERPL-MCNC: 90 MG/DL (ref 65–140)
POTASSIUM SERPL-SCNC: 4.1 MMOL/L (ref 3.5–5.3)
SODIUM SERPL-SCNC: 138 MMOL/L (ref 136–145)

## 2019-06-24 PROCEDURE — 36415 COLL VENOUS BLD VENIPUNCTURE: CPT | Performed by: OPHTHALMOLOGY

## 2019-06-24 PROCEDURE — 80048 BASIC METABOLIC PNL TOTAL CA: CPT | Performed by: OPHTHALMOLOGY

## 2019-08-06 ENCOUNTER — OFFICE VISIT (OUTPATIENT)
Dept: PULMONOLOGY | Facility: MEDICAL CENTER | Age: 74
End: 2019-08-06
Payer: MEDICARE

## 2019-08-06 VITALS
BODY MASS INDEX: 44.55 KG/M2 | OXYGEN SATURATION: 97 % | SYSTOLIC BLOOD PRESSURE: 122 MMHG | WEIGHT: 221 LBS | DIASTOLIC BLOOD PRESSURE: 76 MMHG | HEART RATE: 73 BPM | HEIGHT: 59 IN | RESPIRATION RATE: 12 BRPM

## 2019-08-06 DIAGNOSIS — D86.9 SARCOIDOSIS: ICD-10-CM

## 2019-08-06 DIAGNOSIS — J43.2 CENTRILOBULAR EMPHYSEMA (HCC): Primary | ICD-10-CM

## 2019-08-06 DIAGNOSIS — E03.9 HYPOTHYROIDISM, UNSPECIFIED TYPE: ICD-10-CM

## 2019-08-06 DIAGNOSIS — R06.02 SHORTNESS OF BREATH: ICD-10-CM

## 2019-08-06 PROCEDURE — 99214 OFFICE O/P EST MOD 30 MIN: CPT | Performed by: NURSE PRACTITIONER

## 2019-08-06 NOTE — ASSESSMENT & PLAN NOTE
Carljose Kothari does go to the gym on a regular basis  Suzanne Jimenez Room air oxygen at rest today was 97%  She has stable sarcoidosis  He also has history of ischemic cardiomyopathy  She follows in the [facility] and sees cardiologist Dr Feliciano Smith  She does have a ICD

## 2019-08-06 NOTE — PROGRESS NOTES
Assessment/Plan:     Problem List Items Addressed This Visit        Respiratory    Centrilobular emphysema ([address] ) - Primary     Dianelys does have mild centrilobular emphysema  This was noted on CT of her chest that was done in 2019  She does have a history of sarcoidosis  She had been on prednisone in the past for uveitis  She had a complete pulmonary function test that was done in January of 2019  Forced vital capacity was stable at 94% of predicted FEV1 was 1 79 L or 90% and obstruction ratio 74  Her diffusion capacity was mildly decreased at 63% when corrected for alveolar volume flow volume loop was normal  She does report some mild wheezing on occasion but today her lungs are clear to auscultation  I did offer her a inhaled corticosteroid or nebulizer she deferred both  I did offer to give her a inhaled anticholinergic  Feel this would be appropriate  She defers at this time she is on Medicaid many medications  He feels that her wheezing is likely secondary to cardiac issues  It is likely that this is accurate              Other    Sarcoidosis     Peri Watkins had a repeat CT of chest with IV contrast done in April of 2018  At that time stable mediastinal adenopathy and chronic pulmonary opacities were noted in keeping with patient's history of sarcoidosis  There was also mild centrilobular emphysematous changes  She had a repeat CT of the chest with contrast   This was done in January 2019  Stable mediastinal adenopathy was noted  Stable mild centrilobular pulmonary emphysema  And chronic bilateral infiltrates in the right lower lobe unchanged from prior study  There were no new pulmonary findings  Patient currently is stable  There is no need for any repeat at this point  He is asymptomatic  Patient does have a initial diagnosis of sarcoidosis in 2014  This was biopsy proven in showed evidence of noncaseating granulomas consistent with the diagnosis    A right supraclavicular ultrasound-guided needle biopsy was done in the lymph node in 2014  I did personally review the CT of the chest today  This was also reviewed 1 was initially done in January of 2019  Shortness of breath     Oscar Romero does go to the gym on a regular basis  [facility] Room air oxygen at rest today was 97%  She has stable sarcoidosis  He also has history of ischemic cardiomyopathy  She follows in the [facility] and sees cardiologist Dr Hilario Rubinstein  She does have a ICD  Other Visit Diagnoses     Hypothyroidism, unspecified type        Relevant Orders    Ambulatory referral to [facility]            Return in about 1 year (around 8/6/2020), or if symptoms worsen or fail to improve  All questions are answered to the patient's satisfaction and understanding  She verbalizes understanding  She is encouraged to call with any further questions or concerns  Portions of the record may have been created with voice recognition software  Occasional wrong word or "sound a like" substitutions may have occurred due to the inherent limitations of voice recognition software  Read the chart carefully and recognize, using context, where substitutions have occurred  HPI:  Oscar Roemro is a 27-year-old female with mild centrilobular emphysema  She did have CT of her chest that was done in January 2019 she also has a history of sarcoidosis  She had a complete pulmonary function test done in January 2019  Residual volume was 91% of predicted diffusion cap capacity mildly decreased at 63%  She also has history of atrial fibrillation and is on anticoagulation  Electronically Signed by LILLIANA Atkins    ______________________________________________________________________    Chief Complaint:   Chief Complaint   Patient presents with    Shortness of Breath     pt states very good      Cough     occasional     Wheezing     occasional       Patient ID: Oscar Romero is a 69 y o  y o  female has a past medical history of 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[address] ), Sarcoidosis, and SOB (shortness of breath)  8/6/2019  Patient presents today for follow-up visit  Shortness of Breath   This is a chronic problem  The current episode started more than 1 year ago  The problem occurs daily  The problem has been waxing and waning  The symptoms are aggravated by exercise  The patient has no known risk factors (On anticoagulation) for DVT/PE  She has tried beta agonist inhalers, rest and steroid inhalers for the symptoms  The treatment provided mild relief  Her past medical history is significant for chronic lung disease  Cough   Associated symptoms include shortness of breath  Wheezing    Associated symptoms include coughing and shortness of breath  Her past medical history is significant for chronic lung disease  Review of Systems   Constitutional: Negative  HENT: Negative  Respiratory: Positive for cough and shortness of breath  Cardiovascular: Negative  Gastrointestinal: Negative  Endocrine: Negative  Genitourinary: Negative  Musculoskeletal: Negative  Skin: Negative  Allergic/Immunologic: Negative  Neurological: Negative  Hematological: Negative  Psychiatric/Behavioral: Negative  Smoking history: She reports that she quit smoking about 32 years ago  She has a 60 00 pack-year smoking history   She has never used smokeless tobacco     The following portions of the patient's history were reviewed and updated as appropriate: allergies, current medications, past family history, past medical history, past social history, past surgical history and problem list     Immunization History   Administered Date(s) Administered    Influenza Split High Dose Preservative Free IM 10/01/2014    Influenza TIV (IM) 09/10/2013    Pneumococcal Polysaccharide PPV23 10/01/2014     Current Outpatient Medications   Medication Sig Dispense Refill    apixaban (ELIQUIS) 5 mg Take 5 mg by mouth 2 (two) times a day      aspirin 81 MG tablet Take by mouth      atorvastatin (LIPITOR) 10 mg tablet Take 10 mg by mouth      ENTRESTO 24-26 MG TABS   0    eplerenone (INSPRA) 50 MG tablet Take 50 mg by mouth daily      furosemide (LASIX) 20 mg tablet   0    furosemide (LASIX) 40 mg tablet Take 40 mg by mouth daily  0    HYDROcodone-acetaminophen (NORCO) 5-325 mg per tablet Take 1 tablet by mouth every 4 to 6 hours if needed for pain  0    levothyroxine 50 mcg tablet Take 50 mcg by mouth daily  0    metoprolol succinate (TOPROL-XL) 50 mg 24 hr tablet   0    metoprolol succinate (TOPROL-XL) 50 mg 24 hr tablet Take 50 mg by mouth daily      ramipril (ALTACE) 2 5 mg capsule Take 2 5 mg by mouth      tinidazole (TINDAMAX) 500 MG tablet Take 2,000 mg by mouth daily  0    triamcinolone (KENALOG) 0 1 % cream APPLY A THIN LAYER TO THE AFFECTED AREA TWICE DAILY IF NEEDED AVOID USE ON FACE  0    atorvastatin (LIPITOR) 10 mg tablet Take 10 mg by mouth daily      levothyroxine 25 mcg tablet Take 25 mcg by mouth daily      losartan (COZAAR) 25 mg tablet   0    penicillin V potassium (VEETID) 500 mg tablet TAKE 1 TABLET BY MOUTH 4 TIMES A DAY UNTIL FINISHED  0    prednisoLONE acetate (PRED FORTE) 1 % ophthalmic suspension instill 1 drop into left eye four times a day  0    predniSONE 10 mg tablet Take 5 mg by mouth daily        predniSONE 5 mg tablet Take 5 mg by mouth 2 (two) times a day  0    ramipril (ALTACE) 2 5 mg capsule Take 2 5 mg by mouth daily       No current facility-administered medications for this visit  Allergies: Patient has no known allergies  Objective:  Vitals:    08/06/19 0955   BP: 122/76   BP Location: Left arm   Patient Position: Sitting   Cuff Size: Extra-Large   Pulse: 73   Resp: 12   SpO2: 97%   Weight: 100 kg (221 lb)   Height: 4' 10 5" (1 486 m)   Oxygen Therapy  SpO2: 97 %      Wt Readings from Last 3 Encounters:   08/06/19 100 kg (221 lb)   02/06/19 101 kg (223 lb)   01/09/19 102 kg (224 lb)     Body mass index is 45 4 kg/m²  Physical Exam   Constitutional: She is oriented to person, place, and time  She appears well-developed and well-nourished  obesity   HENT:   Head: Normocephalic and atraumatic  Mouth/Throat: Oropharynx is clear and moist    Mallampati 4   Eyes: Pupils are equal, round, and reactive to light  EOM are normal    Neck: Normal range of motion  Neck supple  Cardiovascular: Normal rate and regular rhythm  Pulmonary/Chest: Effort normal    Abdominal: Soft  Musculoskeletal: Normal range of motion  Right lower leg: Normal         Left lower leg: Normal    Neurological: She is alert and oriented to person, place, and time  Skin: Skin is warm and dry  Capillary refill takes less than 2 seconds  Psychiatric: She has a normal mood and affect  Her behavior is normal        Lab Review:   Transcribe Orders on 06/24/2019   Component Date Value    Sodium 06/24/2019 138     Potassium 06/24/2019 4 1     Chloride 06/24/2019 103     CO2 06/24/2019 27     ANION GAP 06/24/2019 8     BUN 06/24/2019 19     Creatinine 06/24/2019 1 01     Glucose 06/24/2019 90     Calcium 06/24/2019 9 2     eGFR 06/24/2019 55        Diagnostics:  I have personally reviewed pertinent films in PACS    Office Spirometry Results:     ESS:    No results found

## 2019-08-06 NOTE — ASSESSMENT & PLAN NOTE
[facility] had a repeat CT of chest with IV contrast done in April of 2018  At that time stable mediastinal adenopathy and chronic pulmonary opacities were noted in keeping with patient's history of sarcoidosis  There was also mild centrilobular emphysematous changes  She had a repeat CT of the chest with contrast   This was done in January 2019  Stable mediastinal adenopathy was noted  Stable mild centrilobular pulmonary emphysema  And chronic bilateral infiltrates in the right lower lobe unchanged from prior study  There were no new pulmonary findings  Patient currently is stable  There is no need for any repeat at this point  He is asymptomatic  Patient does have a initial diagnosis of sarcoidosis in 2014  This was biopsy proven in showed evidence of noncaseating granulomas consistent with the diagnosis  A right supraclavicular ultrasound-guided needle biopsy was done in the lymph node in 2014  I did personally review the CT of the chest today  This was also reviewed 1 was initially done in January of 2019

## 2019-08-06 NOTE — ASSESSMENT & PLAN NOTE
Jocelynn Messer does have mild centrilobular emphysema  This was noted on CT of her chest that was done in 2019  She does have a history of sarcoidosis  She had been on prednisone in the past for uveitis  She had a complete pulmonary function test that was done in January of 2019  Forced vital capacity was stable at 94% of predicted FEV1 was 1 79 L or 90% and obstruction ratio 74  Her diffusion capacity was mildly decreased at 63% when corrected for alveolar volume flow volume loop was normal  She does report some mild wheezing on occasion but today her lungs are clear to auscultation  I did offer her a inhaled corticosteroid or nebulizer she deferred both  I did offer to give her a inhaled anticholinergic  Feel this would be appropriate  She defers at this time she is on Medicaid many medications  He feels that her wheezing is likely secondary to cardiac issues    It is likely that this is accurate

## 2019-08-13 ENCOUNTER — FOLLOW UP (OUTPATIENT)
Dept: URBAN - METROPOLITAN AREA CLINIC 27 | Facility: CLINIC | Age: 74
End: 2019-08-13

## 2019-08-13 DIAGNOSIS — H35.372: ICD-10-CM

## 2019-08-13 DIAGNOSIS — H43.89: ICD-10-CM

## 2019-08-13 PROCEDURE — 92012 INTRM OPH EXAM EST PATIENT: CPT

## 2019-08-13 PROCEDURE — 92134 CPTRZ OPH DX IMG PST SGM RTA: CPT

## 2019-08-13 ASSESSMENT — TONOMETRY
OD_IOP_MMHG: 17
OS_IOP_MMHG: 14

## 2019-08-13 ASSESSMENT — VISUAL ACUITY
OS_CC: CF 4FT
OD_CC: 20/25

## 2019-09-20 ENCOUNTER — TRANSCRIBE ORDERS (OUTPATIENT)
Dept: LAB | Facility: CLINIC | Age: 74
End: 2019-09-20

## 2019-09-20 ENCOUNTER — APPOINTMENT (OUTPATIENT)
Dept: LAB | Facility: CLINIC | Age: 74
End: 2019-09-20
Payer: MEDICARE

## 2019-09-20 DIAGNOSIS — R42 DIZZINESS AND GIDDINESS: Primary | ICD-10-CM

## 2019-09-20 DIAGNOSIS — R06.00 DYSPNEA, UNSPECIFIED TYPE: ICD-10-CM

## 2019-09-20 DIAGNOSIS — R42 DIZZINESS AND GIDDINESS: ICD-10-CM

## 2019-09-20 LAB
ALBUMIN SERPL BCP-MCNC: 3.6 G/DL (ref 3.5–5)
ALP SERPL-CCNC: 72 U/L (ref 46–116)
ALT SERPL W P-5'-P-CCNC: 33 U/L (ref 12–78)
ANION GAP SERPL CALCULATED.3IONS-SCNC: 3 MMOL/L (ref 4–13)
AST SERPL W P-5'-P-CCNC: 20 U/L (ref 5–45)
BASOPHILS # BLD AUTO: 0.06 THOUSANDS/ΜL (ref 0–0.1)
BASOPHILS NFR BLD AUTO: 1 % (ref 0–1)
BILIRUB SERPL-MCNC: 0.63 MG/DL (ref 0.2–1)
BUN SERPL-MCNC: 18 MG/DL (ref 5–25)
CALCIUM SERPL-MCNC: 9.5 MG/DL (ref 8.3–10.1)
CHLORIDE SERPL-SCNC: 105 MMOL/L (ref 100–108)
CHOLEST SERPL-MCNC: 157 MG/DL (ref 50–200)
CO2 SERPL-SCNC: 27 MMOL/L (ref 21–32)
CREAT SERPL-MCNC: 0.96 MG/DL (ref 0.6–1.3)
EOSINOPHIL # BLD AUTO: 0.18 THOUSAND/ΜL (ref 0–0.61)
EOSINOPHIL NFR BLD AUTO: 2 % (ref 0–6)
ERYTHROCYTE [DISTWIDTH] IN BLOOD BY AUTOMATED COUNT: 13.7 % (ref 11.6–15.1)
GFR SERPL CREATININE-BSD FRML MDRD: 59 ML/MIN/1.73SQ M
GLUCOSE P FAST SERPL-MCNC: 91 MG/DL (ref 65–99)
HCT VFR BLD AUTO: 44.8 % (ref 34.8–46.1)
HDLC SERPL-MCNC: 47 MG/DL (ref 40–60)
HGB BLD-MCNC: 14.6 G/DL (ref 11.5–15.4)
IMM GRANULOCYTES # BLD AUTO: 0.02 THOUSAND/UL (ref 0–0.2)
IMM GRANULOCYTES NFR BLD AUTO: 0 % (ref 0–2)
LDLC SERPL CALC-MCNC: 84 MG/DL (ref 0–100)
LYMPHOCYTES # BLD AUTO: 0.54 THOUSANDS/ΜL (ref 0.6–4.47)
LYMPHOCYTES NFR BLD AUTO: 7 % (ref 14–44)
MCH RBC QN AUTO: 33 PG (ref 26.8–34.3)
MCHC RBC AUTO-ENTMCNC: 32.6 G/DL (ref 31.4–37.4)
MCV RBC AUTO: 101 FL (ref 82–98)
MONOCYTES # BLD AUTO: 0.95 THOUSAND/ΜL (ref 0.17–1.22)
MONOCYTES NFR BLD AUTO: 13 % (ref 4–12)
NEUTROPHILS # BLD AUTO: 5.85 THOUSANDS/ΜL (ref 1.85–7.62)
NEUTS SEG NFR BLD AUTO: 77 % (ref 43–75)
NONHDLC SERPL-MCNC: 110 MG/DL
NRBC BLD AUTO-RTO: 0 /100 WBCS
PLATELET # BLD AUTO: 227 THOUSANDS/UL (ref 149–390)
PMV BLD AUTO: 10.6 FL (ref 8.9–12.7)
POTASSIUM SERPL-SCNC: 3.8 MMOL/L (ref 3.5–5.3)
PROT SERPL-MCNC: 7.5 G/DL (ref 6.4–8.2)
RBC # BLD AUTO: 4.43 MILLION/UL (ref 3.81–5.12)
SODIUM SERPL-SCNC: 135 MMOL/L (ref 136–145)
TRIGL SERPL-MCNC: 132 MG/DL
TSH SERPL DL<=0.05 MIU/L-ACNC: 0.81 UIU/ML (ref 0.36–3.74)
WBC # BLD AUTO: 7.6 THOUSAND/UL (ref 4.31–10.16)

## 2019-09-20 PROCEDURE — 80053 COMPREHEN METABOLIC PANEL: CPT

## 2019-09-20 PROCEDURE — 36415 COLL VENOUS BLD VENIPUNCTURE: CPT

## 2019-09-20 PROCEDURE — 85025 COMPLETE CBC W/AUTO DIFF WBC: CPT

## 2019-09-20 PROCEDURE — 80061 LIPID PANEL: CPT

## 2019-09-20 PROCEDURE — 84443 ASSAY THYROID STIM HORMONE: CPT

## 2019-10-09 ENCOUNTER — OFFICE VISIT (OUTPATIENT)
Dept: FAMILY MEDICINE CLINIC | Facility: CLINIC | Age: 74
End: 2019-10-09
Payer: MEDICARE

## 2019-10-09 VITALS
SYSTOLIC BLOOD PRESSURE: 128 MMHG | BODY MASS INDEX: 45.08 KG/M2 | RESPIRATION RATE: 16 BRPM | HEIGHT: 59 IN | HEART RATE: 72 BPM | WEIGHT: 223.6 LBS | DIASTOLIC BLOOD PRESSURE: 80 MMHG | TEMPERATURE: 97.8 F

## 2019-10-09 DIAGNOSIS — Z23 ENCOUNTER FOR IMMUNIZATION: ICD-10-CM

## 2019-10-09 DIAGNOSIS — I48.19 PERSISTENT ATRIAL FIBRILLATION (HCC): ICD-10-CM

## 2019-10-09 DIAGNOSIS — J43.9 PULMONARY EMPHYSEMA, UNSPECIFIED EMPHYSEMA TYPE (HCC): ICD-10-CM

## 2019-10-09 DIAGNOSIS — I10 BENIGN ESSENTIAL HYPERTENSION: ICD-10-CM

## 2019-10-09 DIAGNOSIS — Z76.89 ENCOUNTER TO ESTABLISH CARE: Primary | ICD-10-CM

## 2019-10-09 PROBLEM — E66.01 MORBID OBESITY, UNSPECIFIED OBESITY TYPE (HCC): Status: ACTIVE | Noted: 2018-01-14

## 2019-10-09 PROBLEM — I48.91 ATRIAL FIBRILLATION (HCC): Status: ACTIVE | Noted: 2017-10-18

## 2019-10-09 PROBLEM — I44.60 LEFT BUNDLE BRANCH HEMIBLOCK: Status: ACTIVE | Noted: 2017-09-25

## 2019-10-09 PROBLEM — I25.5 ISCHEMIC CARDIOMYOPATHY: Status: ACTIVE | Noted: 2017-09-25

## 2019-10-09 PROCEDURE — 90682 RIV4 VACC RECOMBINANT DNA IM: CPT | Performed by: FAMILY MEDICINE

## 2019-10-09 PROCEDURE — 99213 OFFICE O/P EST LOW 20 MIN: CPT | Performed by: FAMILY MEDICINE

## 2019-10-09 PROCEDURE — 90471 IMMUNIZATION ADMIN: CPT | Performed by: FAMILY MEDICINE

## 2019-10-09 RX ORDER — EPLERENONE 50 MG/1
50 TABLET, FILM COATED ORAL DAILY
COMMUNITY

## 2019-10-09 RX ORDER — LANOLIN ALCOHOL/MO/W.PET/CERES
1 CREAM (GRAM) TOPICAL DAILY
COMMUNITY

## 2019-10-09 RX ORDER — DIPHENOXYLATE HYDROCHLORIDE AND ATROPINE SULFATE 2.5; .025 MG/1; MG/1
1 TABLET ORAL DAILY
COMMUNITY

## 2019-10-09 RX ORDER — MELATONIN
1000 DAILY
COMMUNITY

## 2019-10-09 NOTE — PROGRESS NOTES
[facility] 1945 female MRN: [id number]    [address] [facility]  Follow-up Visit    ASSESSMENT/PLAN  [facility] is a 69 y o  female presents to the office for     Diagnoses and all orders for this visit:    Encounter to establish care    Pulmonary emphysema, unspecified emphysema type ([address] )    Persistent atrial fibrillation    Benign essential hypertension    Encounter for immunization  -     FLUBLOK: influenza vaccine, quadrivalent, recombinant, PF, 0 5 mL    Other orders  -     multivitamin (THERAGRAN) TABS; Take 1 tablet by mouth daily  -     cholecalciferol (VITAMIN D3) 1,000 units tablet; Take 1,000 Units by mouth daily  -     glucosamine-chondroitin 500-400 MG tablet; Take 1 tablet by mouth 3 (three) times a day  -     eplerenone (INSPRA) 50 MG tablet; Take 50 mg by mouth daily      I had the pleasure meeting [employer] today  Patient transferring from her previous PCP who retired  Patient seems to have chronic conditions stable following specialist   Sarcoidosis/mild emphysema being followed by Pulmonary  Currently not on any inhalers  Patient denies any shortness of breath at this time  Persistent AFib with hypertension with a history of CABG disease being followed by Cardiology currently denies any chest pain  At this time we will hold mammogram and AWV till the patient's birthday in May  Patient currently still upset given her recent loss of her son in April  AFib well controlled on medications  Hypertension very well controlled on medications  Pulmonary emphysema controlled on medications continued appreciate Pulmonary recommendations    Disposition: Return to the office in 6 months    Health Maintence:  BMI Counseling: Body mass index is 45 94 kg/m²  Discussed the patient's BMI with her  The BMI is above normal  Nutrition recommendations include reducing portion sizes and consuming healthier snacks   Exercise recommendations include exercising 3-5 times per week  continue going to the gym    Future Appointments   Date Time Provider [place]   5/6/2020  1:15 PM North Irizarry MD [facility]   8/4/2020 11:00 AM LILLIANA Maher [facility]          SUBJECTIVE  CC: Establish Care      HPI:  Deandre Blanco is a 69 y o  female presenting to the office to establish care  Patient is a nimisha woman that has a son and a daughter  Recently lost her son to alcohol abuse  Patient has 3 dogs  Patient also has 2 grandchildren closer with her grandson  Patient has a significant history of sarcoidosis, pulmonary emphysema, AFib,sleep apnea which she does not use a CPAP for, hypertension  At this time the patient takes all her medications compliantly  Currently she has no complaints or concerns  Patient was very tearful today when talking about her late  and her late son  Would like to hold off on her mammogram and any health maintenance  Believes that she perform some of her blood work through her last PCP and will obtain records    Would like the flu vaccine today  Patient states that she exercises with a  at least 3 times a week for 30 minutes  History of chronic insomnia  Review of Systems   Constitutional: Positive for fatigue  Negative for activity change, appetite change, chills and fever  HENT: Negative for congestion  Respiratory: Positive for shortness of breath  Negative for cough and chest tightness  Cardiovascular: Negative for chest pain and leg swelling  Gastrointestinal: Negative for abdominal distention, abdominal pain, constipation, diarrhea, nausea and vomiting  Psychiatric/Behavioral: Positive for sleep disturbance  All other systems reviewed and are negative        Historical Information   The patient history was reviewed as follows:    Past Medical History:   Diagnosis Date    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    Sarcoidosis     SOB (shortness of breath)      Past Surgical History: Procedure Laterality Date    CARDIAC SURGERY  2017    CATARACT EXTRACTION Left 2019    CYST REMOVAL      Lipoma    DILATION AND CURETTAGE OF UTERUS      Ngozi Charter / REPLACE / REMOVE PACEMAKER  2017    OVARIAN CYST REMOVAL       Family History   Problem Relation Age of Onset    Emphysema Father     Cancer Sister         ovarian    Heart attack Brother     Heart disease Family       Social History   Social History     Substance and Sexual Activity   Alcohol Use Yes    Comment: rare     Social History     Substance and Sexual Activity   Drug Use No     Social History     Tobacco Use   Smoking Status Former Smoker    Packs/day: 1 50    Years: 40 00    Pack years: 60 00    Last attempt to quit:     Years since quittin 7   Smokeless Tobacco Never Used       Medications:     Current Outpatient Medications:     apixaban (ELIQUIS) 5 mg, Take 5 mg by mouth 2 (two) times a day, Disp: , Rfl:     atorvastatin (LIPITOR) 10 mg tablet, Take 10 mg by mouth, Disp: , Rfl:     cholecalciferol (VITAMIN D3) 1,000 units tablet, Take 1,000 Units by mouth daily, Disp: , Rfl:     ENTRESTO 24-26 MG TABS, , Disp: , Rfl: 0    eplerenone (INSPRA) 50 MG tablet, Take 50 mg by mouth daily, Disp: , Rfl:     furosemide (LASIX) 40 mg tablet, Take 40 mg by mouth daily, Disp: , Rfl: 0    glucosamine-chondroitin 500-400 MG tablet, Take 1 tablet by mouth 3 (three) times a day, Disp: , Rfl:     levothyroxine 50 mcg tablet, Take 50 mcg by mouth daily, Disp: , Rfl: 0    losartan (COZAAR) 25 mg tablet, , Disp: , Rfl: 0    metoprolol succinate (TOPROL-XL) 50 mg 24 hr tablet, Take 50 mg by mouth daily, Disp: , Rfl:     multivitamin (THERAGRAN) TABS, Take 1 tablet by mouth daily, Disp: , Rfl:     penicillin V potassium (VEETID) 500 mg tablet, TAKE 1 TABLET BY MOUTH 4 TIMES A DAY UNTIL FINISHED, Disp: , Rfl: 0    ramipril (ALTACE) 2 5 mg capsule, Take 2 5 mg by mouth, Disp: , Rfl:     tinidazole (TINDAMAX) 500 MG tablet, Take 2,000 mg by mouth daily, Disp: , Rfl: 0  No Known Allergies    OBJECTIVE    Vitals:   Vitals:    10/09/19 1304   BP: 128/80   BP Location: Left arm   Patient Position: Sitting   Cuff Size: Standard   Pulse: 72   Resp: 16   Temp: 97 8 °F (36 6 °C)   Weight: 101 kg (223 lb 9 6 oz)   Height: 4' 10 5" (1 486 m)           Physical Exam   Constitutional: She is oriented to person, place, and time  Vital signs are normal  She appears well-developed and well-nourished  HENT:   Head: Normocephalic and atraumatic  Eyes: Pupils are equal, round, and reactive to light  Conjunctivae and EOM are normal    Neck: Normal range of motion  Neck supple  Cardiovascular: Normal rate, regular rhythm, S1 normal, S2 normal, normal heart sounds and intact distal pulses  No murmur heard  Pulmonary/Chest: Effort normal and breath sounds normal  No respiratory distress  She has no wheezes  Musculoskeletal: Normal range of motion  She exhibits no edema  Neurological: She is alert and oriented to person, place, and time  She has normal strength  Skin: Skin is warm  Psychiatric: She has a normal mood and affect  Her speech is normal and behavior is normal  Judgment and thought content normal    Vitals reviewed           Kamlesh Kemp MD  [facility] [id number]

## 2019-10-14 ENCOUNTER — OFFICE VISIT (OUTPATIENT)
Dept: FAMILY MEDICINE CLINIC | Facility: CLINIC | Age: 74
End: 2019-10-14
Payer: MEDICARE

## 2019-10-14 VITALS
BODY MASS INDEX: 44.76 KG/M2 | HEART RATE: 72 BPM | RESPIRATION RATE: 14 BRPM | WEIGHT: 222 LBS | SYSTOLIC BLOOD PRESSURE: 110 MMHG | HEIGHT: 59 IN | DIASTOLIC BLOOD PRESSURE: 70 MMHG | TEMPERATURE: 98.2 F

## 2019-10-14 DIAGNOSIS — L03.114 CELLULITIS OF LEFT UPPER EXTREMITY: Primary | ICD-10-CM

## 2019-10-14 PROCEDURE — 90471 IMMUNIZATION ADMIN: CPT | Performed by: FAMILY MEDICINE

## 2019-10-14 PROCEDURE — 90714 TD VACC NO PRESV 7 YRS+ IM: CPT | Performed by: FAMILY MEDICINE

## 2019-10-14 PROCEDURE — 99213 OFFICE O/P EST LOW 20 MIN: CPT | Performed by: FAMILY MEDICINE

## 2019-10-14 RX ORDER — CEPHALEXIN 500 MG/1
500 CAPSULE ORAL EVERY 8 HOURS SCHEDULED
Qty: 21 CAPSULE | Refills: 0 | Status: SHIPPED | OUTPATIENT
Start: 2019-10-14 | End: 2019-10-21

## 2019-10-14 NOTE — PROGRESS NOTES
Assessment/Plan:     Diagnoses and all orders for this visit:    Cellulitis of left upper extremity  -     cephalexin (KEFLEX) 500 mg capsule; Take 1 capsule (500 mg total) by mouth every 8 (eight) hours for 7 days  -     TD VACCINE GREATER THAN OR EQUAL TO 6YO PRESERVATIVE FREE IM    Will give tetanus given recent metal cage wound and tetanus >10 years  Patient has some erythema and warmth to the area and will give keflex  No allergies to medication  Monitor for changes in wound  Side effects reviewed  Clean twice a day with soap and water  Can use neosporin to the area  Patient agrees with plan  RTC if worsening  Subjective:      Patient ID: Fredy Wood is a 69 y o  female  68 y/o female presents for concerns for left hand wound  Patient was cleaning dog crate where her hand got caught and opened a wound  She has been cleaning it with soap and water along with hydrogen peroxide  Applying OTC ointment  Patient denies any fevers or chills  The area around it has started to get erythematous  No recent tetanus as crate is made of metal  Skin was flapped over but not healing over it  Denies any swelling to the area  The following portions of the patient's history were reviewed and updated as appropriate: allergies, current medications, past family history, past medical history, past social history, past surgical history and problem list     Review of Systems   Constitutional: Negative for appetite change, chills, fatigue and fever  HENT: Negative for ear pain and sore throat  Eyes: Negative for visual disturbance  Respiratory: Negative for shortness of breath  Cardiovascular: Negative for chest pain and leg swelling  Gastrointestinal: Negative for abdominal pain, diarrhea, nausea and vomiting  Musculoskeletal: Negative for arthralgias  Skin: Positive for wound  Negative for color change  Neurological: Negative for dizziness, tremors, light-headedness and headaches  Psychiatric/Behavioral: Negative for agitation and behavioral problems  Objective:      /70 (BP Location: Left arm, Patient Position: Sitting, Cuff Size: Adult)   Pulse 72   Temp 98 2 °F (36 8 °C) (Tympanic)   Resp 14   Ht 4' 10 5" (1 486 m)   Wt 101 kg (222 lb)   BMI 45 61 kg/m²          Physical Exam   Constitutional: She is oriented to person, place, and time  She appears well-developed and well-nourished  No distress  HENT:   Head: Normocephalic and atraumatic  Eyes: EOM are normal  Right eye exhibits no discharge  Left eye exhibits no discharge  Cardiovascular: Normal rate, regular rhythm, normal heart sounds and intact distal pulses  No murmur heard  Pulmonary/Chest: Effort normal and breath sounds normal  No respiratory distress  Abdominal: Soft  Bowel sounds are normal  She exhibits no distension  There is no tenderness  Musculoskeletal: Normal range of motion  She exhibits no edema  Neurological: She is alert and oriented to person, place, and time  Skin: Skin is warm  Left dorsum hand with wound with open that is scabbing with erythema and slight warm around it  About 0 5 cm  Psychiatric: She has a normal mood and affect   Her behavior is normal

## 2019-10-15 ENCOUNTER — FOLLOW UP (OUTPATIENT)
Dept: URBAN - METROPOLITAN AREA CLINIC 27 | Facility: CLINIC | Age: 74
End: 2019-10-15

## 2019-10-15 DIAGNOSIS — H43.89: ICD-10-CM

## 2019-10-15 DIAGNOSIS — H43.391: ICD-10-CM

## 2019-10-15 DIAGNOSIS — H35.372: ICD-10-CM

## 2019-10-15 DIAGNOSIS — H25.9: ICD-10-CM

## 2019-10-15 DIAGNOSIS — Z96.1: ICD-10-CM

## 2019-10-15 PROCEDURE — 92134 CPTRZ OPH DX IMG PST SGM RTA: CPT

## 2019-10-15 PROCEDURE — 92014 COMPRE OPH EXAM EST PT 1/>: CPT

## 2019-10-15 ASSESSMENT — VISUAL ACUITY
OS_CC: CF 5FT
OD_CC: 20/25+2

## 2019-10-15 ASSESSMENT — TONOMETRY
OS_IOP_MMHG: 16
OD_IOP_MMHG: 17

## 2020-04-29 DIAGNOSIS — E03.9 HYPOTHYROIDISM, UNSPECIFIED TYPE: Primary | ICD-10-CM

## 2020-04-29 RX ORDER — LEVOTHYROXINE SODIUM 0.05 MG/1
50 TABLET ORAL DAILY
Qty: 90 TABLET | Refills: 0 | Status: SHIPPED | OUTPATIENT
Start: 2020-04-29 | End: 2020-06-25 | Stop reason: SDUPTHER

## 2020-06-25 ENCOUNTER — OFFICE VISIT (OUTPATIENT)
Dept: FAMILY MEDICINE CLINIC | Facility: CLINIC | Age: 75
End: 2020-06-25
Payer: MEDICARE

## 2020-06-25 VITALS
BODY MASS INDEX: 44.07 KG/M2 | DIASTOLIC BLOOD PRESSURE: 68 MMHG | HEART RATE: 87 BPM | SYSTOLIC BLOOD PRESSURE: 124 MMHG | WEIGHT: 218.6 LBS | HEIGHT: 59 IN | OXYGEN SATURATION: 97 % | RESPIRATION RATE: 20 BRPM | TEMPERATURE: 98.7 F

## 2020-06-25 DIAGNOSIS — I48.91 ATRIAL FIBRILLATION, UNSPECIFIED TYPE (HCC): ICD-10-CM

## 2020-06-25 DIAGNOSIS — I10 BENIGN ESSENTIAL HYPERTENSION: ICD-10-CM

## 2020-06-25 DIAGNOSIS — E03.9 HYPOTHYROIDISM, UNSPECIFIED TYPE: ICD-10-CM

## 2020-06-25 DIAGNOSIS — R21 RASH: ICD-10-CM

## 2020-06-25 DIAGNOSIS — Z00.00 MEDICARE ANNUAL WELLNESS VISIT, SUBSEQUENT: Primary | ICD-10-CM

## 2020-06-25 DIAGNOSIS — J43.2 CENTRILOBULAR EMPHYSEMA (HCC): ICD-10-CM

## 2020-06-25 DIAGNOSIS — E78.5 HYPERLIPIDEMIA, UNSPECIFIED HYPERLIPIDEMIA TYPE: ICD-10-CM

## 2020-06-25 PROCEDURE — 3074F SYST BP LT 130 MM HG: CPT | Performed by: FAMILY MEDICINE

## 2020-06-25 PROCEDURE — 99214 OFFICE O/P EST MOD 30 MIN: CPT | Performed by: FAMILY MEDICINE

## 2020-06-25 PROCEDURE — 1170F FXNL STATUS ASSESSED: CPT | Performed by: FAMILY MEDICINE

## 2020-06-25 PROCEDURE — 3078F DIAST BP <80 MM HG: CPT | Performed by: FAMILY MEDICINE

## 2020-06-25 PROCEDURE — 4040F PNEUMOC VAC/ADMIN/RCVD: CPT | Performed by: FAMILY MEDICINE

## 2020-06-25 PROCEDURE — 1036F TOBACCO NON-USER: CPT | Performed by: FAMILY MEDICINE

## 2020-06-25 PROCEDURE — G0439 PPPS, SUBSEQ VISIT: HCPCS | Performed by: FAMILY MEDICINE

## 2020-06-25 PROCEDURE — 1125F AMNT PAIN NOTED PAIN PRSNT: CPT | Performed by: FAMILY MEDICINE

## 2020-06-25 PROCEDURE — 1123F ACP DISCUSS/DSCN MKR DOCD: CPT | Performed by: FAMILY MEDICINE

## 2020-06-25 PROCEDURE — 1160F RVW MEDS BY RX/DR IN RCRD: CPT | Performed by: FAMILY MEDICINE

## 2020-06-25 RX ORDER — LEVOTHYROXINE SODIUM 0.05 MG/1
50 TABLET ORAL DAILY
Qty: 90 TABLET | Refills: 2 | Status: SHIPPED | OUTPATIENT
Start: 2020-06-25 | End: 2021-03-12

## 2020-06-25 RX ORDER — CLOBETASOL PROPIONATE 0.5 MG/G
CREAM TOPICAL 2 TIMES DAILY
Qty: 60 G | Refills: 1 | Status: SHIPPED | OUTPATIENT
Start: 2020-06-25 | End: 2021-03-19

## 2020-06-25 RX ORDER — NYSTATIN 100000 [USP'U]/G
POWDER TOPICAL 3 TIMES DAILY
Qty: 60 G | Refills: 0 | Status: SHIPPED | OUTPATIENT
Start: 2020-06-25 | End: 2021-03-19

## 2020-08-05 ENCOUNTER — OFFICE VISIT (OUTPATIENT)
Dept: PULMONOLOGY | Facility: MEDICAL CENTER | Age: 75
End: 2020-08-05
Payer: MEDICARE

## 2020-08-05 VITALS
HEIGHT: 59 IN | RESPIRATION RATE: 16 BRPM | OXYGEN SATURATION: 96 % | SYSTOLIC BLOOD PRESSURE: 108 MMHG | BODY MASS INDEX: 44.55 KG/M2 | DIASTOLIC BLOOD PRESSURE: 70 MMHG | HEART RATE: 69 BPM | WEIGHT: 221 LBS | TEMPERATURE: 98.7 F

## 2020-08-05 DIAGNOSIS — D86.9 SARCOIDOSIS: Primary | ICD-10-CM

## 2020-08-05 DIAGNOSIS — J43.2 CENTRILOBULAR EMPHYSEMA (HCC): ICD-10-CM

## 2020-08-05 DIAGNOSIS — R06.02 SHORTNESS OF BREATH: ICD-10-CM

## 2020-08-05 PROCEDURE — 99214 OFFICE O/P EST MOD 30 MIN: CPT | Performed by: NURSE PRACTITIONER

## 2020-08-05 PROCEDURE — 3078F DIAST BP <80 MM HG: CPT | Performed by: NURSE PRACTITIONER

## 2020-08-05 PROCEDURE — 4040F PNEUMOC VAC/ADMIN/RCVD: CPT | Performed by: NURSE PRACTITIONER

## 2020-08-05 PROCEDURE — 3074F SYST BP LT 130 MM HG: CPT | Performed by: NURSE PRACTITIONER

## 2020-08-05 PROCEDURE — 1160F RVW MEDS BY RX/DR IN RCRD: CPT | Performed by: NURSE PRACTITIONER

## 2020-08-05 PROCEDURE — 3008F BODY MASS INDEX DOCD: CPT | Performed by: NURSE PRACTITIONER

## 2020-08-05 PROCEDURE — 1036F TOBACCO NON-USER: CPT | Performed by: NURSE PRACTITIONER

## 2020-08-05 NOTE — PROGRESS NOTES
Assessment/Plan:     Problem List Items Addressed This Visit        Respiratory    Centrilobular emphysema ([address] )     Frank Otero has mild centrilobular emphysema  Her last CT of chest was done in January 2019 for which mild emphysematous changes were noted  She had complete pulmonary function test done in January 2019  Forced vital capacity was stable at 94% of predicted FEV1 was 1 79 L or sat 90% of predicted and obstruction ratio was 74%  Her diffusion capacity was mildly decreased at 63%  However when corrected for alveolar volume, it was normal   Flow volume loop was normal   Patient defers any maintenance therapy at this time  She has used inhaled corticosteroid in the past and has mouth soreness from this  She does not use a rescue inhaler  Her chief complaint is wheezing which is likely multifactorial   She does have a history of ischemic cardiomyopathy  Other    Sarcoidosis - Primary     Frank Otero is here today for follow-up  She had her last CT of chest that was done in January 2019  I personally reviewed images  Stable findings were noted  She had stable mediastinal adenopathy and chronic pulmonary opacities in keeping with patient's history of sarcoid  Also stable mild centrilobular pulmonary emphysema was noted  Patient had initial biopsy of sarcoidosis in 2014  Biopsy revealed evidence of noncaseating granulomas consistent with the diagnosis  Patient had had uveitis which is secondary to her sarcoidosis  She was maintenance dose of prednisone 5 mg daily but has stopped using this P she had been following with ophthalmologist but has not been as of late secondary to COVID-19  Currently she appears stable  Would defer any additional imaging at this time  Shortness of breath     Frank Otero has shortness of breath on exertion  Her room air oxygen at rest was 96% with ambulating 250 ft was 91% she had been a candidate in the past for supplemental oxygen in did use for some time    She does not fit criteria today  She also has history of obesity with BMI 45 4  She feels that her breathing is stable at this point  Likely her shortness of breath is multifactorial                  Return in about 6 months (around 2/5/2021)  All questions are answered to the patient's satisfaction and understanding  She verbalizes understanding  She is encouraged to call with any further questions or concerns  Portions of the record may have been created with voice recognition software  Occasional wrong word or "sound a like" substitutions may have occurred due to the inherent limitations of voice recognition software  Read the chart carefully and recognize, using context, where substitutions have occurred  Electronically Signed by LILLIANA Breen    ______________________________________________________________________    Chief Complaint:   Chief Complaint   Patient presents with    Follow-up    Shortness of Breath     walking       Patient ID: Idalmis Alas is a 77 y o  y o  female has a past medical history of Coronary artery disease, 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[address] ), Hypertension, Obesity, Sarcoidosis, and SOB (shortness of breath)  8/5/2020  Patient presents today for follow-up visit  HPI pre is here today as a follow-up  She has a history of centrilobular emphysema  And also sarcoidosis  Idalmis Alas had a complicated medical history  She had been on prednisone in the past for uveitis but no longer is  Is noted that she had a complete PFT in January 2019  At that time her forced vital capacity was 94% of predicted, FEV1 was 1 79 L or 90% of predicted and obstruction ratio  Her diffusion capacity was mildly decreased at 63%  However when corrected for alveolar volume was normal  Last CT of chest was done in April 2018  This was done with IV contrast   She has stable mediastinal adenopathy and chronic pulmonary opacities  This is seen as her history of sarcoidosis    There is also mild centrilobular emphysematous changes  Repeat CT of chest was done in January of 2019  Again stable mediastinal adenopathy was noted with stable mild centrilobular pulmonary emphysema there were no new findings  Initial biopsy of sarcoidosis was in 2014  This was biopsy proven and showed evidence of noncaseating granulomas consistent with diagnosis  A patient also has history of ischemic cardiomyopathy    Review of Systems   Constitutional: Positive for fatigue  HENT: Negative  Eyes: Negative  Respiratory: Positive for wheezing  Cardiovascular: Positive for chest pain  Gastrointestinal: Negative  Endocrine: Negative  Genitourinary: Negative  Musculoskeletal: Negative  Skin: Negative  Allergic/Immunologic: Negative  Neurological: Negative  Hematological: Negative  Psychiatric/Behavioral: Negative  Smoking history: She reports that she quit smoking about 33 years ago  She has a 60 00 pack-year smoking history   She has never used smokeless tobacco     The following portions of the patient's history were reviewed and updated as appropriate: problem list     Immunization History   Administered Date(s) Administered    Influenza Split High Dose Preservative Free IM 10/01/2014    Influenza TIV (IM) 09/10/2013    Influenza, recombinant, quadrivalent,injectable, preservative free 10/09/2019    Pneumococcal 10/09/2018    Pneumococcal Conjugate 13-Valent 10/09/2017    Pneumococcal Polysaccharide PPV23 10/01/2014    TD (adult) Preservative Free 10/14/2019     Current Outpatient Medications   Medication Sig Dispense Refill    apixaban (ELIQUIS) 5 mg Take 5 mg by mouth 2 (two) times a day      atorvastatin (LIPITOR) 10 mg tablet Take 10 mg by mouth      cholecalciferol (VITAMIN D3) 1,000 units tablet Take 1,000 Units by mouth daily      clobetasol (TEMOVATE) 0 05 % cream Apply topically 2 (two) times a day 60 g 1    ENTRESTO 24-26 MG TABS   0    eplerenone (INSPRA) 50 MG tablet Take 50 mg by mouth daily      furosemide (LASIX) 40 mg tablet Take 40 mg by mouth daily  0    glucosamine-chondroitin 500-400 MG tablet Take 1 tablet by mouth 3 (three) times a day      levothyroxine 50 mcg tablet Take 1 tablet (50 mcg total) by mouth daily 90 tablet 2    metoprolol succinate (TOPROL-XL) 50 mg 24 hr tablet Take 50 mg by mouth daily      Multiple Vitamins-Minerals (HAIR SKIN AND NAILS FORMULA PO) Take by mouth      multivitamin (THERAGRAN) TABS Take 1 tablet by mouth daily      nystatin (MYCOSTATIN) powder Apply topically 3 (three) times a day 60 g 0    losartan (COZAAR) 25 mg tablet   0    penicillin V potassium (VEETID) 500 mg tablet TAKE 1 TABLET BY MOUTH 4 TIMES A DAY UNTIL FINISHED  0    ramipril (ALTACE) 2 5 mg capsule Take 2 5 mg by mouth      tinidazole (TINDAMAX) 500 MG tablet Take 2,000 mg by mouth daily  0     No current facility-administered medications for this visit  Allergies: Patient has no known allergies  Objective:  Vitals:    08/05/20 0956   BP: 108/70   BP Location: Left arm   Patient Position: Sitting   Cuff Size: Standard   Pulse: 69   Resp: 16   Temp: 98 7 °F (37 1 °C)   TempSrc: Temporal   SpO2: 96%   Weight: 100 kg (221 lb)   Height: 4' 10 5"   Oxygen Therapy  SpO2: 96 %    Wt Readings from Last 3 Encounters:   08/05/20 100 kg (221 lb)   06/25/20 99 2 kg (218 lb 9 6 oz)   10/14/19 101 kg (222 lb)     Body mass index is 45 4 kg/m²  Physical Exam   Constitutional: She appears well-developed  BMI 45   HENT:   Head: Normocephalic and atraumatic  Mouth/Throat: Mucous membranes are moist  Oropharynx is clear  Eyes: Pupils are equal, round, and reactive to light  Neck: Normal range of motion  Cardiovascular: Normal rate and regular rhythm  Pulmonary/Chest: Effort normal and breath sounds normal    Abdominal: Soft  Musculoskeletal: Normal range of motion  Neurological: She is alert  Skin: Skin is warm and dry   Capillary refill takes less than 2 seconds  Psychiatric: Her behavior is normal  Mood normal    Nursing note and vitals reviewed  Lab Review:   No visits with results within 6 Month(s) from this visit     Latest known visit with results is:   Appointment on 09/20/2019   Component Date Value    Cholesterol 09/20/2019 157     Triglycerides 09/20/2019 132     HDL, Direct 09/20/2019 47     LDL Calculated 09/20/2019 84     Non-HDL-Chol (CHOL-HDL) 09/20/2019 110     Sodium 09/20/2019 135*    Potassium 09/20/2019 3 8     Chloride 09/20/2019 105     CO2 09/20/2019 27     ANION GAP 09/20/2019 3*    BUN 09/20/2019 18     Creatinine 09/20/2019 0 96     Glucose, Fasting 09/20/2019 91     Calcium 09/20/2019 9 5     AST 09/20/2019 20     ALT 09/20/2019 33     Alkaline Phosphatase 09/20/2019 72     Total Protein 09/20/2019 7 5     Albumin 09/20/2019 3 6     Total Bilirubin 09/20/2019 0 63     eGFR 09/20/2019 59     WBC 09/20/2019 7 60     RBC 09/20/2019 4 43     Hemoglobin 09/20/2019 14 6     Hematocrit 09/20/2019 44 8     MCV 09/20/2019 101*    MCH 09/20/2019 33 0     MCHC 09/20/2019 32 6     RDW 09/20/2019 13 7     MPV 09/20/2019 10 6     Platelets 64/21/9554 227     nRBC 09/20/2019 0     Neutrophils Relative 09/20/2019 77*    Immat GRANS % 09/20/2019 0     Lymphocytes Relative 09/20/2019 7*    Monocytes Relative 09/20/2019 13*    Eosinophils Relative 09/20/2019 2     Basophils Relative 09/20/2019 1     Neutrophils Absolute 09/20/2019 5 85     Immature Grans Absolute 09/20/2019 0 02     Lymphocytes Absolute 09/20/2019 0 54*    Monocytes Absolute 09/20/2019 0 95     Eosinophils Absolute 09/20/2019 0 18     Basophils Absolute 09/20/2019 0 06     TSH 3RD GENERATON 09/20/2019 0 811        Past Surgical History:   Procedure Laterality Date    CARDIAC SURGERY  06/02/2017    CATARACT EXTRACTION Left 07/28/2019    CYST REMOVAL      Lipoma    DILATION AND CURETTAGE OF UTERUS      EYE SURGERY      Jerel Mccall / Mary Kohler / REMOVE PACEMAKER  12/20/2017    OVARIAN CYST REMOVAL          Family History   Problem Relation Age of Onset    Emphysema Father     Cancer Sister         ovarian    Heart attack Brother     Heart disease Family         Diagnostics:  I have personally reviewed pertinent films in PACS    Office Spirometry Results:     ESS:    No results found

## 2020-08-05 NOTE — ASSESSMENT & PLAN NOTE
Ciro Johnson has shortness of breath on exertion  Her room air oxygen at rest was 96% with ambulating 250 ft was 91% she had been a candidate in the past for supplemental oxygen in did use for some time  She does not fit criteria today  She also has history of obesity with BMI 45 4  She feels that her breathing is stable at this point    Likely her shortness of breath is multifactorial

## 2020-08-05 NOTE — ASSESSMENT & PLAN NOTE
Gregoria Dooley is here today for follow-up  She had her last CT of chest that was done in January 2019  I personally reviewed images  Stable findings were noted  She had stable mediastinal adenopathy and chronic pulmonary opacities in keeping with patient's history of sarcoid  Also stable mild centrilobular pulmonary emphysema was noted  Patient had initial biopsy of sarcoidosis in 2014  Biopsy revealed evidence of noncaseating granulomas consistent with the diagnosis  Patient had had uveitis which is secondary to her sarcoidosis  She was maintenance dose of prednisone 5 mg daily but has stopped using this P she had been following with ophthalmologist but has not been as of late secondary to COVID-19  Currently she appears stable  Would defer any additional imaging at this time

## 2020-08-05 NOTE — ASSESSMENT & PLAN NOTE
Barry Alexandre has mild centrilobular emphysema  Her last CT of chest was done in January 2019 for which mild emphysematous changes were noted  She had complete pulmonary function test done in January 2019  Forced vital capacity was stable at 94% of predicted FEV1 was 1 79 L or sat 90% of predicted and obstruction ratio was 74%  Her diffusion capacity was mildly decreased at 63%  However when corrected for alveolar volume, it was normal   Flow volume loop was normal   Patient defers any maintenance therapy at this time  She has used inhaled corticosteroid in the past and has mouth soreness from this  She does not use a rescue inhaler  Her chief complaint is wheezing which is likely multifactorial   She does have a history of ischemic cardiomyopathy

## 2020-09-22 ENCOUNTER — LAB (OUTPATIENT)
Dept: LAB | Facility: CLINIC | Age: 75
End: 2020-09-22
Payer: MEDICARE

## 2020-09-22 ENCOUNTER — TRANSCRIBE ORDERS (OUTPATIENT)
Dept: LAB | Facility: CLINIC | Age: 75
End: 2020-09-22

## 2020-09-22 DIAGNOSIS — R06.00 DYSPNEA, UNSPECIFIED TYPE: Primary | ICD-10-CM

## 2020-09-22 DIAGNOSIS — R06.00 DYSPNEA, UNSPECIFIED TYPE: ICD-10-CM

## 2020-09-22 DIAGNOSIS — C78.00 MALIGNANT NEOPLASM METASTATIC TO LUNG, UNSPECIFIED LATERALITY (HCC): ICD-10-CM

## 2020-09-22 DIAGNOSIS — E03.9 HYPOTHYROIDISM, UNSPECIFIED TYPE: ICD-10-CM

## 2020-09-22 DIAGNOSIS — I48.91 ATRIAL FIBRILLATION, UNSPECIFIED TYPE (HCC): ICD-10-CM

## 2020-09-22 DIAGNOSIS — I10 BENIGN ESSENTIAL HYPERTENSION: ICD-10-CM

## 2020-09-22 DIAGNOSIS — E78.5 HYPERLIPIDEMIA, UNSPECIFIED HYPERLIPIDEMIA TYPE: ICD-10-CM

## 2020-09-22 LAB
ALBUMIN SERPL BCP-MCNC: 3.3 G/DL (ref 3.5–5)
ALP SERPL-CCNC: 70 U/L (ref 46–116)
ALT SERPL W P-5'-P-CCNC: 31 U/L (ref 12–78)
ANION GAP SERPL CALCULATED.3IONS-SCNC: 6 MMOL/L (ref 4–13)
AST SERPL W P-5'-P-CCNC: 17 U/L (ref 5–45)
BASOPHILS # BLD AUTO: 0.04 THOUSANDS/ΜL (ref 0–0.1)
BASOPHILS NFR BLD AUTO: 1 % (ref 0–1)
BILIRUB SERPL-MCNC: 0.49 MG/DL (ref 0.2–1)
BUN SERPL-MCNC: 22 MG/DL (ref 5–25)
CALCIUM ALBUM COR SERPL-MCNC: 9.9 MG/DL (ref 8.3–10.1)
CALCIUM SERPL-MCNC: 9.3 MG/DL (ref 8.3–10.1)
CHLORIDE SERPL-SCNC: 106 MMOL/L (ref 100–108)
CHOLEST SERPL-MCNC: 155 MG/DL (ref 50–200)
CO2 SERPL-SCNC: 26 MMOL/L (ref 21–32)
CREAT SERPL-MCNC: 0.92 MG/DL (ref 0.6–1.3)
EOSINOPHIL # BLD AUTO: 0.3 THOUSAND/ΜL (ref 0–0.61)
EOSINOPHIL NFR BLD AUTO: 5 % (ref 0–6)
ERYTHROCYTE [DISTWIDTH] IN BLOOD BY AUTOMATED COUNT: 13.8 % (ref 11.6–15.1)
GFR SERPL CREATININE-BSD FRML MDRD: 62 ML/MIN/1.73SQ M
GLUCOSE P FAST SERPL-MCNC: 88 MG/DL (ref 65–99)
HCT VFR BLD AUTO: 42.7 % (ref 34.8–46.1)
HDLC SERPL-MCNC: 41 MG/DL
HGB BLD-MCNC: 13.9 G/DL (ref 11.5–15.4)
IMM GRANULOCYTES # BLD AUTO: 0.02 THOUSAND/UL (ref 0–0.2)
IMM GRANULOCYTES NFR BLD AUTO: 0 % (ref 0–2)
LDLC SERPL CALC-MCNC: 87 MG/DL (ref 0–100)
LYMPHOCYTES # BLD AUTO: 0.49 THOUSANDS/ΜL (ref 0.6–4.47)
LYMPHOCYTES NFR BLD AUTO: 8 % (ref 14–44)
MCH RBC QN AUTO: 32.6 PG (ref 26.8–34.3)
MCHC RBC AUTO-ENTMCNC: 32.6 G/DL (ref 31.4–37.4)
MCV RBC AUTO: 100 FL (ref 82–98)
MONOCYTES # BLD AUTO: 0.76 THOUSAND/ΜL (ref 0.17–1.22)
MONOCYTES NFR BLD AUTO: 12 % (ref 4–12)
NEUTROPHILS # BLD AUTO: 4.88 THOUSANDS/ΜL (ref 1.85–7.62)
NEUTS SEG NFR BLD AUTO: 74 % (ref 43–75)
NONHDLC SERPL-MCNC: 114 MG/DL
NRBC BLD AUTO-RTO: 0 /100 WBCS
PLATELET # BLD AUTO: 214 THOUSANDS/UL (ref 149–390)
PMV BLD AUTO: 10.9 FL (ref 8.9–12.7)
POTASSIUM SERPL-SCNC: 4 MMOL/L (ref 3.5–5.3)
PROT SERPL-MCNC: 7.1 G/DL (ref 6.4–8.2)
RBC # BLD AUTO: 4.26 MILLION/UL (ref 3.81–5.12)
SODIUM SERPL-SCNC: 138 MMOL/L (ref 136–145)
TRIGL SERPL-MCNC: 133 MG/DL
TSH SERPL DL<=0.05 MIU/L-ACNC: 1.48 UIU/ML (ref 0.36–3.74)
WBC # BLD AUTO: 6.49 THOUSAND/UL (ref 4.31–10.16)

## 2020-09-22 PROCEDURE — 36415 COLL VENOUS BLD VENIPUNCTURE: CPT | Performed by: INTERNAL MEDICINE

## 2020-09-22 PROCEDURE — 80061 LIPID PANEL: CPT | Performed by: INTERNAL MEDICINE

## 2020-09-22 PROCEDURE — 85025 COMPLETE CBC W/AUTO DIFF WBC: CPT

## 2020-09-22 PROCEDURE — 84443 ASSAY THYROID STIM HORMONE: CPT

## 2020-09-22 PROCEDURE — 80053 COMPREHEN METABOLIC PANEL: CPT | Performed by: INTERNAL MEDICINE

## 2021-02-25 ENCOUNTER — IMMUNIZATIONS (OUTPATIENT)
Dept: FAMILY MEDICINE CLINIC | Facility: HOSPITAL | Age: 76
End: 2021-02-25

## 2021-02-25 DIAGNOSIS — Z23 ENCOUNTER FOR IMMUNIZATION: Primary | ICD-10-CM

## 2021-02-25 PROCEDURE — 91301 SARS-COV-2 / COVID-19 MRNA VACCINE (MODERNA) 100 MCG: CPT

## 2021-02-25 PROCEDURE — 0011A SARS-COV-2 / COVID-19 MRNA VACCINE (MODERNA) 100 MCG: CPT

## 2021-03-12 DIAGNOSIS — E03.9 HYPOTHYROIDISM, UNSPECIFIED TYPE: ICD-10-CM

## 2021-03-12 RX ORDER — LEVOTHYROXINE SODIUM 0.05 MG/1
TABLET ORAL
Qty: 90 TABLET | Refills: 0 | Status: SHIPPED | OUTPATIENT
Start: 2021-03-12 | End: 2021-03-19 | Stop reason: SDUPTHER

## 2021-03-19 ENCOUNTER — OFFICE VISIT (OUTPATIENT)
Dept: FAMILY MEDICINE CLINIC | Facility: CLINIC | Age: 76
End: 2021-03-19
Payer: COMMERCIAL

## 2021-03-19 VITALS
TEMPERATURE: 97.6 F | HEART RATE: 76 BPM | BODY MASS INDEX: 44.76 KG/M2 | OXYGEN SATURATION: 98 % | DIASTOLIC BLOOD PRESSURE: 72 MMHG | RESPIRATION RATE: 16 BRPM | SYSTOLIC BLOOD PRESSURE: 130 MMHG | WEIGHT: 222 LBS | HEIGHT: 59 IN

## 2021-03-19 DIAGNOSIS — E66.01 MORBID OBESITY, UNSPECIFIED OBESITY TYPE (HCC): ICD-10-CM

## 2021-03-19 DIAGNOSIS — I48.91 ATRIAL FIBRILLATION, UNSPECIFIED TYPE (HCC): ICD-10-CM

## 2021-03-19 DIAGNOSIS — I27.20 PULMONARY HYPERTENSION (HCC): ICD-10-CM

## 2021-03-19 DIAGNOSIS — J43.2 CENTRILOBULAR EMPHYSEMA (HCC): ICD-10-CM

## 2021-03-19 DIAGNOSIS — Z12.31 SCREENING MAMMOGRAM, ENCOUNTER FOR: ICD-10-CM

## 2021-03-19 DIAGNOSIS — D86.9 SARCOIDOSIS: Primary | ICD-10-CM

## 2021-03-19 DIAGNOSIS — E78.5 HYPERLIPIDEMIA, UNSPECIFIED HYPERLIPIDEMIA TYPE: ICD-10-CM

## 2021-03-19 DIAGNOSIS — E03.9 HYPOTHYROIDISM, UNSPECIFIED TYPE: ICD-10-CM

## 2021-03-19 PROCEDURE — 99214 OFFICE O/P EST MOD 30 MIN: CPT | Performed by: FAMILY MEDICINE

## 2021-03-19 RX ORDER — LEVOTHYROXINE SODIUM 0.05 MG/1
50 TABLET ORAL DAILY
Qty: 90 TABLET | Refills: 2 | Status: SHIPPED | OUTPATIENT
Start: 2021-03-19 | End: 2021-06-14 | Stop reason: SDUPTHER

## 2021-03-19 RX ORDER — METOPROLOL SUCCINATE 50 MG/1
50 TABLET, EXTENDED RELEASE ORAL DAILY
Qty: 90 TABLET | Refills: 2 | Status: SHIPPED | OUTPATIENT
Start: 2021-03-19 | End: 2021-06-14 | Stop reason: SDUPTHER

## 2021-03-19 RX ORDER — ATORVASTATIN CALCIUM 10 MG/1
10 TABLET, FILM COATED ORAL DAILY
Qty: 90 TABLET | Refills: 2 | Status: SHIPPED | OUTPATIENT
Start: 2021-03-19 | End: 2021-06-14 | Stop reason: SDUPTHER

## 2021-03-19 NOTE — PROGRESS NOTES
Michelle Sanchez 1945 female MRN: [id number]    [facility] OFFICE VISIT  [facility] - [address]      ASSESSMENT/PLAN  Michelle Sanchez is a 77 y o  female presents to the office for    Diagnoses and all orders for this visit:    Sarcoidosis    Centrilobular emphysema ([facility] )    Pulmonary hypertension ([facility] )    Atrial fibrillation, unspecified type ([facility] )  -     metoprolol succinate (TOPROL-XL) 50 mg 24 hr tablet; Take 1 tablet (50 mg total) by mouth daily  -     CBC and differential; Future    Morbid obesity, unspecified obesity type ([facility] )    Screening mammogram, encounter for  -     Mammo screening bilateral w cad; Future    Hypothyroidism, unspecified type  -     levothyroxine 50 mcg tablet; Take 1 tablet (50 mcg total) by mouth daily  -     TSH, 3rd generation; Future    Hyperlipidemia, unspecified hyperlipidemia type  -     atorvastatin (LIPITOR) 10 mg tablet; Take 1 tablet (10 mg total) by mouth daily      Screening mammogram given to patient  Sarcoidosis/ pulmonary hypertension being managed by Pulmonary  Currently stable  AFib unfortunately patient states that there was a medication change  I have not seen any documentation from her cardiologist   We will personally be calling them on Monday to find out what medication was increased  She is stating that it might be Eliquis which we usually do not place patient is on 50 mg of Eliquis  Hypothyroidism recommend that the patient repeat blood work levels  hyperlipidemiaContinue all medications as prescribed  BMI Counseling: Body mass index is 45 61 kg/m²  The BMI is above normal  Nutrition recommendations include decreasing portion sizes, decreasing fast food intake and limiting drinks that contain sugar  Exercise recommendations include exercising 3-5 times per week               Future Appointments   Date Time Provider He Moran   3/26/2021 11:00 AM [address] [place] [address] 5/5/2021 11:00 AM Jonny Grsadi, DO [facility] [place] [facility]   6/14/2021 10:30 AM Macey Sotomayor MD [facility]          SUBJECTIVE  CC: Follow-up (chronic conditions)      HPI:  Nadeen Adams is a 77 y o  female who presents for a chronic follow-up  Patient already got her 1st COVID vaccines 2nd 1 is going to be on March 26, 2021  Patient has a long history of cardiac disease which she sees a cardiologist for  Patient states the medication was adjusted recently given that they found something on her ICD reading  Patient denies any chest pain  Patient states that her hypothyroidism is controlled just picked up a refill but has not checked her levels  Cholesterol has been checked by her cardiologist   Lois Mercer her medications as prescribed without any difficulties  She is set to see her pulmonologist in the next couple months  She states that she is still short of breath and believes it is cardiac related and not pulmonary related         Review of Systems   Constitutional: Negative for activity change, appetite change, chills, fatigue and fever  HENT: Negative for congestion  Respiratory: Positive for shortness of breath  Negative for cough and chest tightness  Cardiovascular: Negative for chest pain and leg swelling  Gastrointestinal: Negative for abdominal distention, abdominal pain, constipation, diarrhea, nausea and vomiting  All other systems reviewed and are negative        Historical Information   The patient history was reviewed as follows:  Past Medical History:   Diagnosis Date    Coronary artery disease     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HCC)     bullous    Hypertension     Obesity     Sarcoidosis     SOB (shortness of breath)          Medications:     Current Outpatient Medications:     apixaban (ELIQUIS) 5 mg, Take 5 mg by mouth 2 (two) times a day, Disp: , Rfl:     atorvastatin (LIPITOR) 10 mg tablet, Take 1 tablet (10 mg total) by mouth daily, Disp: 90 tablet, Rfl: 2   cholecalciferol (VITAMIN D3) 1,000 units tablet, Take 1,000 Units by mouth daily, Disp: , Rfl:     ENTRESTO 24-26 MG TABS, , Disp: , Rfl: 0    eplerenone (INSPRA) 50 MG tablet, Take 50 mg by mouth daily, Disp: , Rfl:     furosemide (LASIX) 40 mg tablet, Take 40 mg by mouth daily, Disp: , Rfl: 0    glucosamine-chondroitin 500-400 MG tablet, Take 1 tablet by mouth 3 (three) times a day, Disp: , Rfl:     levothyroxine 50 mcg tablet, Take 1 tablet (50 mcg total) by mouth daily, Disp: 90 tablet, Rfl: 2    metoprolol succinate (TOPROL-XL) 50 mg 24 hr tablet, Take 1 tablet (50 mg total) by mouth daily, Disp: 90 tablet, Rfl: 2    Multiple Vitamins-Minerals (HAIR SKIN AND NAILS FORMULA PO), Take by mouth, Disp: , Rfl:     multivitamin (THERAGRAN) TABS, Take 1 tablet by mouth daily, Disp: , Rfl:     losartan (COZAAR) 25 mg tablet, , Disp: , Rfl: 0    penicillin V potassium (VEETID) 500 mg tablet, TAKE 1 TABLET BY MOUTH 4 TIMES A DAY UNTIL FINISHED, Disp: , Rfl: 0    ramipril (ALTACE) 2 5 mg capsule, Take 2 5 mg by mouth, Disp: , Rfl:     tinidazole (TINDAMAX) 500 MG tablet, Take 2,000 mg by mouth daily, Disp: , Rfl: 0    No Known Allergies    OBJECTIVE  Vitals:   Vitals:    03/19/21 1317   BP: 130/72   BP Location: Left arm   Patient Position: Sitting   Cuff Size: Large   Pulse: 76   Resp: 16   Temp: 97 6 °F (36 4 °C)   TempSrc: Temporal   SpO2: 98%   Weight: 101 kg (222 lb)   Height: 4' 10 5" (1 486 m)         Physical Exam  Vitals signs reviewed  Constitutional:       Appearance: She is well-developed  HENT:      Head: Normocephalic and atraumatic  Eyes:      Conjunctiva/sclera: Conjunctivae normal       Pupils: Pupils are equal, round, and reactive to light  Neck:      Musculoskeletal: Normal range of motion and neck supple  Cardiovascular:      Rate and Rhythm: Normal rate and regular rhythm  Heart sounds: Normal heart sounds     Pulmonary:      Effort: Pulmonary effort is normal  No respiratory distress  Breath sounds: Normal breath sounds  Musculoskeletal: Normal range of motion  Skin:     General: Skin is warm  Capillary Refill: Capillary refill takes less than 2 seconds  Neurological:      Mental Status: She is alert and oriented to person, place, and time  Psychiatric:         Mood and Affect: Mood normal          Behavior: Behavior normal          Thought Content:  Thought content normal                     Valentino Hutching, MD,   [facility]  3/20/2021

## 2021-03-22 ENCOUNTER — APPOINTMENT (OUTPATIENT)
Dept: LAB | Facility: CLINIC | Age: 76
End: 2021-03-22
Payer: MEDICARE

## 2021-03-22 ENCOUNTER — TELEPHONE (OUTPATIENT)
Dept: FAMILY MEDICINE CLINIC | Facility: CLINIC | Age: 76
End: 2021-03-22

## 2021-03-22 DIAGNOSIS — E03.9 HYPOTHYROIDISM, UNSPECIFIED TYPE: ICD-10-CM

## 2021-03-22 DIAGNOSIS — I48.91 ATRIAL FIBRILLATION, UNSPECIFIED TYPE (HCC): ICD-10-CM

## 2021-03-22 LAB
BASOPHILS # BLD AUTO: 0.05 THOUSANDS/ΜL (ref 0–0.1)
BASOPHILS NFR BLD AUTO: 1 % (ref 0–1)
EOSINOPHIL # BLD AUTO: 0.28 THOUSAND/ΜL (ref 0–0.61)
EOSINOPHIL NFR BLD AUTO: 4 % (ref 0–6)
ERYTHROCYTE [DISTWIDTH] IN BLOOD BY AUTOMATED COUNT: 13.5 % (ref 11.6–15.1)
HCT VFR BLD AUTO: 43 % (ref 34.8–46.1)
HGB BLD-MCNC: 13.7 G/DL (ref 11.5–15.4)
IMM GRANULOCYTES # BLD AUTO: 0.03 THOUSAND/UL (ref 0–0.2)
IMM GRANULOCYTES NFR BLD AUTO: 0 % (ref 0–2)
LYMPHOCYTES # BLD AUTO: 0.7 THOUSANDS/ΜL (ref 0.6–4.47)
LYMPHOCYTES NFR BLD AUTO: 9 % (ref 14–44)
MCH RBC QN AUTO: 32.5 PG (ref 26.8–34.3)
MCHC RBC AUTO-ENTMCNC: 31.9 G/DL (ref 31.4–37.4)
MCV RBC AUTO: 102 FL (ref 82–98)
MONOCYTES # BLD AUTO: 0.97 THOUSAND/ΜL (ref 0.17–1.22)
MONOCYTES NFR BLD AUTO: 12 % (ref 4–12)
NEUTROPHILS # BLD AUTO: 6.08 THOUSANDS/ΜL (ref 1.85–7.62)
NEUTS SEG NFR BLD AUTO: 74 % (ref 43–75)
NRBC BLD AUTO-RTO: 0 /100 WBCS
PLATELET # BLD AUTO: 259 THOUSANDS/UL (ref 149–390)
PMV BLD AUTO: 10.8 FL (ref 8.9–12.7)
RBC # BLD AUTO: 4.22 MILLION/UL (ref 3.81–5.12)
TSH SERPL DL<=0.05 MIU/L-ACNC: 1.52 UIU/ML (ref 0.36–3.74)
WBC # BLD AUTO: 8.11 THOUSAND/UL (ref 4.31–10.16)

## 2021-03-22 PROCEDURE — 85025 COMPLETE CBC W/AUTO DIFF WBC: CPT

## 2021-03-22 PROCEDURE — 84443 ASSAY THYROID STIM HORMONE: CPT

## 2021-03-22 PROCEDURE — 36415 COLL VENOUS BLD VENIPUNCTURE: CPT

## 2021-03-22 NOTE — TELEPHONE ENCOUNTER
----- Message from Micheal Goltz, MD sent at 3/20/2021  8:04 AM EDT -----  On Monday, Can we call Dr Castro Velasco to please find out what medication was changed  Patient feel that Eliquis was increase to 50??? That can't be true    #[phone]

## 2021-03-23 ENCOUNTER — TELEPHONE (OUTPATIENT)
Dept: FAMILY MEDICINE CLINIC | Facility: CLINIC | Age: 76
End: 2021-03-23

## 2021-03-23 DIAGNOSIS — D75.89 INCREASED MCV: Primary | ICD-10-CM

## 2021-03-23 NOTE — TELEPHONE ENCOUNTER
----- Message from Isa Buck MD sent at 3/23/2021  1:28 PM EDT -----  Thyroid level is normal  CBC shows MCV slightly elevated compared to previous years  Recommend that the patient take B12 250 daily if she isn't already   In June would like her to repeat levels before our appointment of CBC and b12

## 2021-03-25 ENCOUNTER — TELEPHONE (OUTPATIENT)
Dept: FAMILY MEDICINE CLINIC | Facility: CLINIC | Age: 76
End: 2021-03-25

## 2021-03-25 NOTE — TELEPHONE ENCOUNTER
----- Message from Bia Cuenca MD sent at 3/20/2021  8:04 AM EDT -----  On Monday, Can we call Dr Alberto Kemp to please find out what medication was changed  Patient feel that Eliquis was increase to 50??? That can't be true    #[phone]

## 2021-03-26 ENCOUNTER — IMMUNIZATIONS (OUTPATIENT)
Dept: FAMILY MEDICINE CLINIC | Facility: HOSPITAL | Age: 76
End: 2021-03-26

## 2021-03-26 DIAGNOSIS — Z23 ENCOUNTER FOR IMMUNIZATION: Primary | ICD-10-CM

## 2021-03-26 PROCEDURE — 0012A SARS-COV-2 / COVID-19 MRNA VACCINE (MODERNA) 100 MCG: CPT

## 2021-03-26 PROCEDURE — 91301 SARS-COV-2 / COVID-19 MRNA VACCINE (MODERNA) 100 MCG: CPT

## 2021-03-29 ENCOUNTER — TELEPHONE (OUTPATIENT)
Dept: FAMILY MEDICINE CLINIC | Facility: CLINIC | Age: 76
End: 2021-03-29

## 2021-03-29 DIAGNOSIS — I10 BENIGN ESSENTIAL HYPERTENSION: Primary | ICD-10-CM

## 2021-03-29 RX ORDER — SACUBITRIL AND VALSARTAN 49; 51 MG/1; MG/1
1 TABLET, FILM COATED ORAL 2 TIMES DAILY
Qty: 90 TABLET | Refills: 1
Start: 2021-03-29

## 2021-03-29 NOTE — TELEPHONE ENCOUNTER
----- Message from Kaiser Montenegro MD sent at 3/20/2021  8:04 AM EDT -----  On Monday, Can we call Dr Melissa Mabry to please find out what medication was changed  Patient feel that Eliquis was increase to 50??? That can't be true    #[phone]

## 2021-03-29 NOTE — TELEPHONE ENCOUNTER
I'VE CALLED DR GRACIA [facility] OFFICE TWICE AND LEFT A VM BOTH TIMES THEY STILL HAVE NOT CALLED BACK

## 2021-03-29 NOTE — TELEPHONE ENCOUNTER
I just left another vociemessage for the nursing staff   Please call patient and advise her a total of 4 calls went out and no answer to what medication was changes

## 2021-05-05 ENCOUNTER — OFFICE VISIT (OUTPATIENT)
Dept: PULMONOLOGY | Facility: MEDICAL CENTER | Age: 76
End: 2021-05-05
Payer: COMMERCIAL

## 2021-05-05 VITALS
HEART RATE: 49 BPM | HEIGHT: 59 IN | WEIGHT: 223 LBS | SYSTOLIC BLOOD PRESSURE: 122 MMHG | OXYGEN SATURATION: 94 % | DIASTOLIC BLOOD PRESSURE: 82 MMHG | BODY MASS INDEX: 44.96 KG/M2 | TEMPERATURE: 97.2 F | RESPIRATION RATE: 12 BRPM

## 2021-05-05 DIAGNOSIS — R09.82 POSTNASAL DRIP: ICD-10-CM

## 2021-05-05 DIAGNOSIS — D86.9 SARCOIDOSIS: Primary | ICD-10-CM

## 2021-05-05 DIAGNOSIS — R06.2 WHEEZE: ICD-10-CM

## 2021-05-05 DIAGNOSIS — R06.02 SHORTNESS OF BREATH: ICD-10-CM

## 2021-05-05 PROCEDURE — 99214 OFFICE O/P EST MOD 30 MIN: CPT | Performed by: INTERNAL MEDICINE

## 2021-05-05 NOTE — PROGRESS NOTES
Assessment/Plan        Problem List Items Addressed This Visit        Other    Sarcoidosis - Primary       This has remained stable  Last CT scan of her chest was done in January 2019 I reviewed CT scan  There is stable mediastinal hilar adenopathy  She does have cardiomegaly  Some few scattered infiltrates bilaterally which are chronic  No sign of any significant interstitial lung disease due to sarcoidosis  Last  Pulmonary function test done in January 2019 showed normal lung volumes and diffusion capacity was moderately reduced         Shortness of breath      She does have some mild chronic exertional shortness of breath with suspect related to her her underlying ischemic cardiomyopathy and also due to her being obese         Postnasal drip      Does get some nasal congestion postnasal drainage at times  Told she can try over-the-counter allergy medication such as likely or Claritin also try Flonase and Nasacort  She does sometimes get running for nose  I told her if this does not help I can: The prescription for ipratropium bromide 0 03% for possibility of vasomotor rhinitis         Wheeze      She sometimes has wheezing when she 1st lays down at night  I suspect this could be due to LPR  Or postnasal drainage  She will try medication for postnasal drainage  This does not help and she has persistent symptoms she can call me and I will order chest x-ray  Also may consider give her albuterol inhaler 2 puffs at bedtime as needed  CC:   Some shortness of breath with activity  Has wheeze when she first lays down at night      LASHELL Dumont does have history of sarcoidosis and mild COPD  She was diagnosed with sarcoidosis in 2014  She had a right cervical lymph node biopsy by surgeon Dr Edward García  which showed evidence of noncaseating granulomas consistent with sarcoidosis    Last CT scan of chest with contrast was done 1/15/19  And showed stable mediastinal bilateral hilar adenopathy,   Right paresis esophageal adenopathy and also Lymph nodes  not changed in size  There is some emphysematous changes and some mild infiltrates bilaterally there are scattered  No evidence of any extensive fibrosis and she does have cardiomegaly  [facility] does have ischemic cardiomyopathy, atrial fibrillation, hypothyroidism and hyperlipidemia  She does have a defibrillator device in place  Last pulmonary function test was done in January 2019 and showed lung volumes to be normal   Diffusion capacity was moderately reduced at 48% of predicted and adjusted for alveolar volume was mildly to moderate reduced at 65% of predicted  She has been on prednisone because of sarcoidosis apparently with some eye involvement  Ophthalmologist had her on this medication  She does have history of mild HERMANN but could not tolerate CPAP therapy in the past   Overall AHI was 9  She also has history of CABG done June 2017 at [facility] and has ACID device in place  She is on chronic anticoagulation with Eliquis 5 mg twice a day  Also takes Entresto   49-51 mg 1 tab twice a day, furosemide 40 mg daily and eplerenone  She is blind in her left eye      she does get some runny nose at times and sometimes has postnasal drainage        Past Medical History:   Diagnosis Date    Coronary artery disease     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    Hypertension     Obesity     Sarcoidosis     SOB (shortness of breath)        Past Surgical History:   Procedure Laterality Date    CARDIAC SURGERY  06/02/2017    CATARACT EXTRACTION Left 07/28/2019    CYST REMOVAL      Lipoma    DILATION AND CURETTAGE OF UTERUS      EYE SURGERY      INSERT / REPLACE / REMOVE PACEMAKER  12/20/2017    OVARIAN CYST REMOVAL           Current Outpatient Medications:     apixaban (ELIQUIS) 5 mg, Take 5 mg by mouth 2 (two) times a day, Disp: , Rfl:     atorvastatin (LIPITOR) 10 mg tablet, Take 1 tablet (10 mg total) by mouth daily, Disp: 90 tablet, Rfl: 2    cholecalciferol (VITAMIN D3) 1,000 units tablet, Take 1,000 Units by mouth daily, Disp: , Rfl:     ENTRESTO 24-26 MG TABS, , Disp: , Rfl: 0    eplerenone (INSPRA) 50 MG tablet, Take 50 mg by mouth daily, Disp: , Rfl:     furosemide (LASIX) 40 mg tablet, Take 40 mg by mouth daily, Disp: , Rfl: 0    glucosamine-chondroitin 500-400 MG tablet, Take 1 tablet by mouth 3 (three) times a day, Disp: , Rfl:     levothyroxine 50 mcg tablet, Take 1 tablet (50 mcg total) by mouth daily, Disp: 90 tablet, Rfl: 2    losartan (COZAAR) 25 mg tablet, , Disp: , Rfl: 0    metoprolol succinate (TOPROL-XL) 50 mg 24 hr tablet, Take 1 tablet (50 mg total) by mouth daily, Disp: 90 tablet, Rfl: 2    Multiple Vitamins-Minerals (HAIR SKIN AND NAILS FORMULA PO), Take by mouth, Disp: , Rfl:     multivitamin (THERAGRAN) TABS, Take 1 tablet by mouth daily, Disp: , Rfl:     penicillin V potassium (VEETID) 500 mg tablet, TAKE 1 TABLET BY MOUTH 4 TIMES A DAY UNTIL FINISHED, Disp: , Rfl: 0    ramipril (ALTACE) 2 5 mg capsule, Take 2 5 mg by mouth, Disp: , Rfl:     sacubitril-valsartan (Entresto) 49-51 MG TABS, Take 1 tablet by mouth 2 (two) times a day, Disp: 90 tablet, Rfl: 1    tinidazole (TINDAMAX) 500 MG tablet, Take 2,000 mg by mouth daily, Disp: , Rfl: 0    No Known Allergies    Social History     Tobacco Use    Smoking status: Former Smoker     Packs/day: 1 50     Years: 40 00     Pack years: 60 00     Quit date:      Years since quittin 3    Smokeless tobacco: Never Used   Substance Use Topics    Alcohol use: Yes     Comment: rare         Family History   Problem Relation Age of Onset    Emphysema Father     Cancer Sister         ovarian    Heart attack Brother     Heart disease Family        Review of Systems   Constitutional: Negative for chills, fever and unexpected weight change  HENT: Positive for postnasal drip  Negative for congestion, rhinorrhea and sore throat  Eyes: Negative for discharge and redness  Respiratory: Positive for shortness of breath  Has some wheezing at nighttime when she lays down   Cardiovascular: Negative for chest pain, palpitations and leg swelling  Gastrointestinal: Negative for abdominal distention, abdominal pain and nausea  No gastric reflux   Endocrine: Negative for polydipsia and polyphagia  Genitourinary: Negative for dysuria  Musculoskeletal: Negative for joint swelling and myalgias  Skin: Negative for rash  Neurological: Negative for light-headedness  Psychiatric/Behavioral: Negative for decreased concentration  Vitals:    05/05/21 1054   BP: 122/82   Pulse: (!) 49   Resp: 12   Temp: (!) 97 2 °F (36 2 °C)   SpO2: 94%     Pulse oximetry testing:    Room air O2 saturation at rest was 97% and with ambulating 250 ft O2 saturation was 94%    Physical Exam  Vitals signs reviewed  Constitutional:       General: She is not in acute distress  Appearance: She is well-developed  She is obese  HENT:      Head: Normocephalic  Nose: Nose normal       Mouth/Throat:      Mouth: Mucous membranes are moist       Pharynx: Oropharynx is clear  No oropharyngeal exudate  Eyes:      Conjunctiva/sclera: Conjunctivae normal       Pupils: Pupils are equal, round, and reactive to light  Cardiovascular:      Rate and Rhythm: Normal rate and regular rhythm  Heart sounds: Normal heart sounds  Pulmonary:      Effort: Pulmonary effort is normal       Comments: Lung sounds are clear  No wheezes crackles or rhonchi  Abdominal:      General: There is no distension  Palpations: Abdomen is soft  Tenderness: There is no abdominal tenderness  Musculoskeletal:      Comments: No edema, cyanosis or clubbing   Skin:     General: Skin is warm and dry  Neurological:      Mental Status: She is alert and oriented to person, place, and time     Psychiatric:         Mood and Affect: Mood normal          Behavior: Behavior normal          Thought Content:  Thought content normal

## 2021-05-05 NOTE — PATIENT INSTRUCTIONS
And try Claritin or Allegra or Zyrtec over-the-counter  This is allergy pill    Can try Nasacort or Flonase 2 sprays in nostril at bedtime  Steroid for inflammation    Other option for a nose spray would be ipratropium bromide 0 03% nasal spray   Two sprays each nostril up to 3 times a day  This will dry up the nasal passages  Could have postnasal drainage due to vasomotor rhinitis    Check with her ophthalmologist about the ipratropium bromide    Last alternative would be to take 2 puffs of albuterol inhaler at bedtime    If you still have wheezing at night and want chest x-ray at all call my office and I will order

## 2021-05-09 PROBLEM — R09.82 POSTNASAL DRIP: Status: ACTIVE | Noted: 2021-05-09

## 2021-05-09 PROBLEM — R06.2 WHEEZE: Status: ACTIVE | Noted: 2021-05-09

## 2021-05-10 NOTE — ASSESSMENT & PLAN NOTE
Does get some nasal congestion postnasal drainage at times  Told she can try over-the-counter allergy medication such as likely or Claritin also try Flonase and Nasacort  She does sometimes get running for nose  I told her if this does not help I can:  The prescription for ipratropium bromide 0 03% for possibility of vasomotor rhinitis

## 2021-05-10 NOTE — ASSESSMENT & PLAN NOTE
She sometimes has wheezing when she 1st lays down at night  I suspect this could be due to LPR  Or postnasal drainage  She will try medication for postnasal drainage  This does not help and she has persistent symptoms she can call me and I will order chest x-ray  Also may consider give her albuterol inhaler 2 puffs at bedtime as needed

## 2021-05-10 NOTE — ASSESSMENT & PLAN NOTE
This has remained stable  Last CT scan of her chest was done in January 2019 I reviewed CT scan  There is stable mediastinal hilar adenopathy  She does have cardiomegaly  Some few scattered infiltrates bilaterally which are chronic  No sign of any significant interstitial lung disease due to sarcoidosis    Last  Pulmonary function test done in January 2019 showed normal lung volumes and diffusion capacity was moderately reduced

## 2021-05-10 NOTE — ASSESSMENT & PLAN NOTE
She does have some mild chronic exertional shortness of breath with suspect related to her her underlying ischemic cardiomyopathy and also due to her being obese

## 2021-05-24 ENCOUNTER — OFFICE VISIT (OUTPATIENT)
Dept: FAMILY MEDICINE CLINIC | Facility: CLINIC | Age: 76
End: 2021-05-24
Payer: COMMERCIAL

## 2021-05-24 VITALS
DIASTOLIC BLOOD PRESSURE: 72 MMHG | WEIGHT: 224.8 LBS | HEIGHT: 59 IN | BODY MASS INDEX: 45.32 KG/M2 | RESPIRATION RATE: 20 BRPM | HEART RATE: 74 BPM | OXYGEN SATURATION: 97 % | TEMPERATURE: 98 F | SYSTOLIC BLOOD PRESSURE: 114 MMHG

## 2021-05-24 DIAGNOSIS — D86.9 SARCOIDOSIS: ICD-10-CM

## 2021-05-24 DIAGNOSIS — R09.82 POSTNASAL DRIP: ICD-10-CM

## 2021-05-24 DIAGNOSIS — H92.02 LEFT EAR PAIN: Primary | ICD-10-CM

## 2021-05-24 DIAGNOSIS — I10 BENIGN ESSENTIAL HYPERTENSION: ICD-10-CM

## 2021-05-24 DIAGNOSIS — I48.0 PAROXYSMAL ATRIAL FIBRILLATION (HCC): ICD-10-CM

## 2021-05-24 PROCEDURE — 99214 OFFICE O/P EST MOD 30 MIN: CPT | Performed by: FAMILY MEDICINE

## 2021-05-24 PROCEDURE — 3078F DIAST BP <80 MM HG: CPT | Performed by: FAMILY MEDICINE

## 2021-05-24 PROCEDURE — 3074F SYST BP LT 130 MM HG: CPT | Performed by: FAMILY MEDICINE

## 2021-05-24 NOTE — PROGRESS NOTES
Jess Garcia 1945 female MRN: [id number]    [facility] OFFICE VISIT  [facility] - [address]      ASSESSMENT/PLAN  Jess Garcia is a 77 y o  female presents to the office for    Diagnoses and all orders for this visit:    Left ear pain    Benign essential hypertension    Paroxysmal atrial fibrillation (HCC)    Postnasal drip    Sarcoidosis    Other orders  -     Cyanocobalamin (Vitamin B 12) 100 MCG LOZG; Take by mouth     Left ear pain showing signs of middle ear effusion  There is no indications of antibiotics at this time  If there is no improvement would recommend a prednisone dose or ENT referral     Hypertension currently at baseline continue medications as prescribed  AFib currently rate controlled   Sarcoidosis well controlled         Future Appointments   Date Time Provider He Moran   6/14/2021 10:30 AM Kelly Snellen, MD [facility]          SUBJECTIVE  CC: Neck Pain (patient having pain on left side of neck in the front from under the ear for 1 week)      HPI:  Jess Garcia is a 77 y o  female who presents for acute appointment  Patient has been having left-sided neck pain in the front underneath her ear for about a week  Patient does not know if it is pressure from her uncontrolled allergies  Patient over the counter medications with minimal relief  Patient is taking her blood pressure / AFib medications without any difficulties  Continues to see her cardiologist   Patient states that her sarcoidosis is stable denies any acute flare-ups   Review of Systems   Constitutional: Negative for activity change, appetite change, chills, fatigue and fever  HENT: Positive for ear pain  Negative for congestion  Respiratory: Negative for cough, chest tightness and shortness of breath  Cardiovascular: Negative for chest pain and leg swelling     Gastrointestinal: Negative for abdominal distention, abdominal pain, constipation, diarrhea, nausea and vomiting  All other systems reviewed and are negative        Historical Information   The patient history was reviewed as follows:  Past Medical History:   Diagnosis Date    Coronary artery disease     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HCC)     bullous    Hypertension     Obesity     Sarcoidosis     SOB (shortness of breath)          Medications:     Current Outpatient Medications:     apixaban (ELIQUIS) 5 mg, Take 5 mg by mouth 2 (two) times a day, Disp: , Rfl:     atorvastatin (LIPITOR) 10 mg tablet, Take 1 tablet (10 mg total) by mouth daily, Disp: 90 tablet, Rfl: 2    cholecalciferol (VITAMIN D3) 1,000 units tablet, Take 1,000 Units by mouth daily, Disp: , Rfl:     Cyanocobalamin (Vitamin B 12) 100 MCG LOZG, Take by mouth, Disp: , Rfl:     ENTRESTO 24-26 MG TABS, , Disp: , Rfl: 0    eplerenone (INSPRA) 50 MG tablet, Take 50 mg by mouth daily, Disp: , Rfl:     furosemide (LASIX) 40 mg tablet, Take 40 mg by mouth daily, Disp: , Rfl: 0    glucosamine-chondroitin 500-400 MG tablet, Take 1 tablet by mouth daily , Disp: , Rfl:     levothyroxine 50 mcg tablet, Take 1 tablet (50 mcg total) by mouth daily, Disp: 90 tablet, Rfl: 2    metoprolol succinate (TOPROL-XL) 50 mg 24 hr tablet, Take 1 tablet (50 mg total) by mouth daily, Disp: 90 tablet, Rfl: 2    Multiple Vitamins-Minerals (HAIR SKIN AND NAILS FORMULA PO), Take by mouth, Disp: , Rfl:     multivitamin (THERAGRAN) TABS, Take 1 tablet by mouth daily, Disp: , Rfl:     sacubitril-valsartan (Entresto) 49-51 MG TABS, Take 1 tablet by mouth 2 (two) times a day, Disp: 90 tablet, Rfl: 1    losartan (COZAAR) 25 mg tablet, , Disp: , Rfl: 0    penicillin V potassium (VEETID) 500 mg tablet, TAKE 1 TABLET BY MOUTH 4 TIMES A DAY UNTIL FINISHED, Disp: , Rfl: 0    ramipril (ALTACE) 2 5 mg capsule, Take 2 5 mg by mouth, Disp: , Rfl:     tinidazole (TINDAMAX) 500 MG tablet, Take 2,000 mg by mouth daily, Disp: , Rfl: 0    No Known Allergies    OBJECTIVE  Vitals: Vitals:    05/24/21 0930   BP: 114/72   BP Location: Left arm   Patient Position: Sitting   Cuff Size: Large   Pulse: 74   Resp: 20   Temp: 98 °F (36 7 °C)   SpO2: 97%   Weight: 102 kg (224 lb 12 8 oz)   Height: 4' 10 5" (1 486 m)         Physical Exam  Vitals signs reviewed  Constitutional:       Appearance: She is well-developed  HENT:      Head: Normocephalic and atraumatic  Right Ear: A middle ear effusion is present  Left Ear: A middle ear effusion is present  Eyes:      Conjunctiva/sclera: Conjunctivae normal       Pupils: Pupils are equal, round, and reactive to light  Neck:      Musculoskeletal: Normal range of motion and neck supple  Cardiovascular:      Rate and Rhythm: Normal rate and regular rhythm  Heart sounds: Normal heart sounds  Pulmonary:      Effort: Pulmonary effort is normal  No respiratory distress  Breath sounds: Normal breath sounds  Musculoskeletal: Normal range of motion  Skin:     General: Skin is warm  Capillary Refill: Capillary refill takes less than 2 seconds  Neurological:      Mental Status: She is alert and oriented to person, place, and time                      Elpidio Gates MD,   [facility]  5/24/2021

## 2021-06-14 ENCOUNTER — OFFICE VISIT (OUTPATIENT)
Dept: FAMILY MEDICINE CLINIC | Facility: CLINIC | Age: 76
End: 2021-06-14
Payer: COMMERCIAL

## 2021-06-14 VITALS
TEMPERATURE: 97.2 F | DIASTOLIC BLOOD PRESSURE: 62 MMHG | RESPIRATION RATE: 14 BRPM | HEART RATE: 72 BPM | BODY MASS INDEX: 44.96 KG/M2 | SYSTOLIC BLOOD PRESSURE: 120 MMHG | HEIGHT: 59 IN | WEIGHT: 223 LBS

## 2021-06-14 DIAGNOSIS — I48.91 ATRIAL FIBRILLATION, UNSPECIFIED TYPE (HCC): ICD-10-CM

## 2021-06-14 DIAGNOSIS — E53.8 B12 DEFICIENCY: ICD-10-CM

## 2021-06-14 DIAGNOSIS — Z12.31 SCREENING MAMMOGRAM, ENCOUNTER FOR: ICD-10-CM

## 2021-06-14 DIAGNOSIS — E78.5 HYPERLIPIDEMIA, UNSPECIFIED HYPERLIPIDEMIA TYPE: ICD-10-CM

## 2021-06-14 DIAGNOSIS — E03.9 HYPOTHYROIDISM, UNSPECIFIED TYPE: ICD-10-CM

## 2021-06-14 DIAGNOSIS — Z00.00 MEDICARE ANNUAL WELLNESS VISIT, SUBSEQUENT: Primary | ICD-10-CM

## 2021-06-14 DIAGNOSIS — E55.9 VITAMIN D DEFICIENCY: ICD-10-CM

## 2021-06-14 DIAGNOSIS — H65.93 FLUID LEVEL BEHIND TYMPANIC MEMBRANE OF BOTH EARS: ICD-10-CM

## 2021-06-14 DIAGNOSIS — Z13.820 SCREENING FOR OSTEOPOROSIS: ICD-10-CM

## 2021-06-14 DIAGNOSIS — I10 BENIGN ESSENTIAL HYPERTENSION: ICD-10-CM

## 2021-06-14 PROCEDURE — 3288F FALL RISK ASSESSMENT DOCD: CPT | Performed by: FAMILY MEDICINE

## 2021-06-14 PROCEDURE — 1125F AMNT PAIN NOTED PAIN PRSNT: CPT | Performed by: FAMILY MEDICINE

## 2021-06-14 PROCEDURE — 3725F SCREEN DEPRESSION PERFORMED: CPT | Performed by: FAMILY MEDICINE

## 2021-06-14 PROCEDURE — 1036F TOBACCO NON-USER: CPT | Performed by: FAMILY MEDICINE

## 2021-06-14 PROCEDURE — 1160F RVW MEDS BY RX/DR IN RCRD: CPT | Performed by: FAMILY MEDICINE

## 2021-06-14 PROCEDURE — 3008F BODY MASS INDEX DOCD: CPT | Performed by: FAMILY MEDICINE

## 2021-06-14 PROCEDURE — 1170F FXNL STATUS ASSESSED: CPT | Performed by: FAMILY MEDICINE

## 2021-06-14 PROCEDURE — G0439 PPPS, SUBSEQ VISIT: HCPCS | Performed by: FAMILY MEDICINE

## 2021-06-14 RX ORDER — LEVOTHYROXINE SODIUM 0.05 MG/1
50 TABLET ORAL DAILY
Qty: 90 TABLET | Refills: 2 | Status: SHIPPED | OUTPATIENT
Start: 2021-06-14 | End: 2022-03-02

## 2021-06-14 RX ORDER — ATORVASTATIN CALCIUM 10 MG/1
10 TABLET, FILM COATED ORAL DAILY
Qty: 90 TABLET | Refills: 2 | Status: SHIPPED | OUTPATIENT
Start: 2021-06-14

## 2021-06-14 RX ORDER — METOPROLOL SUCCINATE 50 MG/1
50 TABLET, EXTENDED RELEASE ORAL DAILY
Qty: 90 TABLET | Refills: 2 | Status: SHIPPED | OUTPATIENT
Start: 2021-06-14 | End: 2022-06-03

## 2021-06-14 NOTE — PROGRESS NOTES
Assessment and Plan:     Problem List Items Addressed This Visit        Cardiovascular and Mediastinum    Atrial fibrillation (HCC)    Relevant Medications    metoprolol succinate (TOPROL-XL) 50 mg 24 hr tablet    Other Relevant Orders    Lipid panel    Comprehensive metabolic panel    CBC and differential    Benign essential hypertension    Relevant Medications    metoprolol succinate (TOPROL-XL) 50 mg 24 hr tablet    Other Relevant Orders    Lipid panel    Comprehensive metabolic panel    CBC and differential      Other Visit Diagnoses     Medicare annual wellness visit, subsequent    -  Primary    Screening mammogram, encounter for        Screening for osteoporosis        Relevant Orders    DXA bone density spine hip and pelvis    Fluid level behind tympanic membrane of both ears        Relevant Orders    Ambulatory Referral to Otolaryngology    Hypothyroidism, unspecified type        Relevant Medications    levothyroxine 50 mcg tablet    metoprolol succinate (TOPROL-XL) 50 mg 24 hr tablet    Other Relevant Orders    TSH, 3rd generation    Hyperlipidemia, unspecified hyperlipidemia type        Relevant Medications    atorvastatin (LIPITOR) 10 mg tablet    B12 deficiency        Relevant Orders    Vitamin B12    Vitamin D deficiency        Relevant Orders    Vitamin D 25 hydroxy      -  Screening DEXA / mammogram given to the patient   -AFib/hypertension/ hyperlipidemia being managed by the patient's cardiologist   Since patient is not faxing will hold off on lipid panel today  However all other labs will be sent for evaluation  Vitamin-D/vitamin B12 sent for eval   Hypothyroidism sent for repeat levels  Patient does have bilateral middle ear effusion  Likely contributing secondary to allergies continue Claritin daily as well as normal saline spray    If it continues to be persistent the 1 time a day sharp pain in the left ear recommend that the patient be seen by the ears nose throat specialist   Patient agrees with our plan     Preventive health issues were discussed with patient, and age appropriate screening tests were ordered as noted in patient's After Visit Summary  Personalized health advice and appropriate referrals for health education or preventive services given if needed, as noted in patient's After Visit Summary  History of Present Illness:     Patient presents for Medicare Annual Wellness visit  Cardiology; next month every 3 months  SOB at her baseline   patient denies any acute chest pain  Does have difficulty hearing but declines having a hearing test be performed today  Patient taking her thyroid medication appropriate without any difficulties   Patient taking B12 100 micrograms daily and vitamin-D a 1000 daily  Patient is that she has her COVID-19 vaccine is willing to go get her mammogram and DEXA scan      Patient Care Team:  Marta Faulkner MD as PCP - General (Family Medicine)  Wilner Booker DO     Problem List:     Patient Active Problem List   Diagnosis    Sarcoidosis    Centrilobular emphysema ([address]    Shortness of breath    Obstructive sleep apnea    Paroxysmal atrial fibrillation (HCC)    Benign essential hypertension    Eczema    Hypoxia, sleep related    Ischemic cardiomyopathy    Left bundle branch hemiblock    Lumbar disc disorder with myelopathy    Lymphadenopathy, mediastinal    Morbid obesity, unspecified obesity type ([address] )    Pulmonary hypertension ([address] )    Pulmonary nodule seen on imaging study    Snoring    Emphysema lung ([address]    Atrial fibrillation ([address] )    Persistent atrial fibrillation ([address] )    Postnasal drip    Wheeze      Past Medical and Surgical History:     Past Medical History:   Diagnosis Date    Coronary artery disease     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    Hypertension     Obesity     Sarcoidosis     SOB (shortness of breath)      Past Surgical History:   Procedure Laterality Date    CARDIAC SURGERY  06/02/2017    CATARACT EXTRACTION Left 2019    CYST REMOVAL      Lipoma    DILATION AND CURETTAGE OF UTERUS      EYE SURGERY      INSERT / REPLACE / REMOVE PACEMAKER  2017    OVARIAN CYST REMOVAL        Family History:     Family History   Problem Relation Age of Onset    Emphysema Father     Cancer Sister         ovarian    Heart attack Brother     Heart disease Family       Social History:     Social History     Socioeconomic History    Marital status:      Spouse name: None    Number of children: None    Years of education: None    Highest education level: None   Occupational History    None   Tobacco Use    Smoking status: Former Smoker     Packs/day: 1 50     Years: 40 00     Pack years: 60 00     Quit date:      Years since quittin 4    Smokeless tobacco: Never Used   Vaping Use    Vaping Use: Never used   Substance and Sexual Activity    Alcohol use: Yes     Comment: rare    Drug use: No    Sexual activity: None   Other Topics Concern    None   Social History Narrative    None     Social Determinants of Health     Financial Resource Strain:     Difficulty of Paying Living Expenses:    Food Insecurity:     Worried About Running Out of Food in the Last Year:     Ran Out of Food in the Last Year:    Transportation Needs:     Lack of Transportation (Medical):      Lack of Transportation (Non-Medical):    Physical Activity:     Days of Exercise per Week:     Minutes of Exercise per Session:    Stress:     Feeling of Stress :    Social Connections:     Frequency of Communication with Friends and Family:     Frequency of Social Gatherings with Friends and Family:     Attends Mu-ism Services:     Active Member of Clubs or Organizations:     Attends Club or Organization Meetings:     Marital Status:    Intimate Partner Violence:     Fear of Current or Ex-Partner:     Emotionally Abused:     Physically Abused:     Sexually Abused:       Medications and Allergies: Current Outpatient Medications   Medication Sig Dispense Refill    apixaban (ELIQUIS) 5 mg Take 5 mg by mouth 2 (two) times a day      atorvastatin (LIPITOR) 10 mg tablet Take 1 tablet (10 mg total) by mouth daily 90 tablet 2    cholecalciferol (VITAMIN D3) 1,000 units tablet Take 1,000 Units by mouth daily      Cyanocobalamin (Vitamin B 12) 100 MCG LOZG Take by mouth      eplerenone (INSPRA) 50 MG tablet Take 50 mg by mouth daily      furosemide (LASIX) 40 mg tablet Take 40 mg by mouth daily  0    glucosamine-chondroitin 500-400 MG tablet Take 1 tablet by mouth daily       levothyroxine 50 mcg tablet Take 1 tablet (50 mcg total) by mouth daily 90 tablet 2    metoprolol succinate (TOPROL-XL) 50 mg 24 hr tablet Take 1 tablet (50 mg total) by mouth daily 90 tablet 2    Multiple Vitamins-Minerals (HAIR SKIN AND NAILS FORMULA PO) Take by mouth      multivitamin (THERAGRAN) TABS Take 1 tablet by mouth daily      sacubitril-valsartan (Entresto) 49-51 MG TABS Take 1 tablet by mouth 2 (two) times a day 90 tablet 1    losartan (COZAAR) 25 mg tablet   0    ramipril (ALTACE) 2 5 mg capsule Take 2 5 mg by mouth      tinidazole (TINDAMAX) 500 MG tablet Take 2,000 mg by mouth daily  0     No current facility-administered medications for this visit       No Known Allergies   Immunizations:     Immunization History   Administered Date(s) Administered    Influenza Split High Dose Preservative Free IM 10/01/2014    Influenza, recombinant, quadrivalent,injectable, preservative free 10/09/2019    Influenza, seasonal, injectable 09/10/2013    Pneumococcal 10/09/2018    Pneumococcal Conjugate 13-Valent 10/09/2017    Pneumococcal Conjugate PCV 7 10/09/2018    Pneumococcal Polysaccharide PPV23 10/01/2014    SARS-CoV-2 / COVID-19 mRNA IM (Moderna) 02/25/2021, 03/26/2021    TD (adult) Preservative Free 10/14/2019      Health Maintenance:         Topic Date Due    Hepatitis C Screening  Never done    Colorectal Cancer Screening  01/24/2022         Topic Date Due    Influenza Vaccine (Season Ended) 09/01/2021      Medicare Health Risk Assessment:     /62 (BP Location: Right arm, Patient Position: Sitting, Cuff Size: Adult)   Pulse 72   Temp (!) 97 2 °F (36 2 °C) (Temporal)   Resp 14   Ht 4' 10 5" (1 486 m)   Wt 101 kg (223 lb)   BMI 45 81 kg/m²      Vish Courtney is here for her Subsequent Wellness visit  Health Risk Assessment:   Patient rates overall health as good  Patient feels that their physical health rating is same  Patient is satisfied with their life  Eyesight was rated as same  Hearing was rated as same  Patient feels that their emotional and mental health rating is same  Patients states they are sometimes angry  Patient states they are often unusually tired/fatigued  Pain experienced in the last 7 days has been some  Patient's pain rating has been 4/10  Patient states that she has experienced no weight loss or gain in last 6 months  Depression Screening:   PHQ-2 Score: 0      Fall Risk Screening: In the past year, patient has experienced: no history of falling in past year      Urinary Incontinence Screening:   Patient has leaked urine accidently in the last six months  Home Safety:  Patient does not have trouble with stairs inside or outside of their home  Patient has working smoke alarms and has working carbon monoxide detector  Home safety hazards include: none  Nutrition:   Current diet is Regular and Frequent junk food  Medications:   Patient is currently taking over-the-counter supplements  OTC medications include: see medication list  Patient is not able to manage medications  Activities of Daily Living (ADLs)/Instrumental Activities of Daily Living (IADLs):   Walk and transfer into and out of bed and chair?: Yes  Dress and groom yourself?: Yes    Bathe or shower yourself?: Yes    Feed yourself?  Yes  Do your laundry/housekeeping?: No  Manage your money, pay your bills and track your expenses?: Yes  Make your own meals?: Yes    Do your own shopping?: Yes    Previous Hospitalizations:   Any hospitalizations or ED visits within the last 12 months?: No      Advance Care Planning:   Living will: Yes    Durable POA for healthcare: Yes    Advanced directive: Yes      Comments: Daughter is in charge of everything  Cognitive Screening:   Provider or family/friend/caregiver concerned regarding cognition?: No    PREVENTIVE SCREENINGS      Cardiovascular Screening:    General: Screening Not Indicated and History Lipid Disorder    Due for: Lipid Panel      Diabetes Screening:     General: Screening Current    Due for: Blood Glucose      Colorectal Cancer Screening:     General: Screening Current      Breast Cancer Screening:     General: Screening Current      Cervical Cancer Screening:    General: Screening Not Indicated      Osteoporosis Screening:      Due for: Bone Density Ultrasound      Abdominal Aortic Aneurysm (AAA) Screening:        General: Screening Not Indicated      Lung Cancer Screening:     General: Screening Not Indicated and History Lung Cancer      Hepatitis C Screening:    General: Screening Not Indicated    Screening, Brief Intervention, and Referral to Treatment (SBIRT)    Screening  Typical number of drinks in a day: 0  Typical number of drinks in a week: 0  Interpretation: Low risk drinking behavior  Single Item Drug Screening:  How often have you used an illegal drug (including marijuana) or a prescription medication for non-medical reasons in the past year? never    Single Item Drug Screen Score: 0  Interpretation: Negative screen for possible drug use disorder    Physical Exam  Constitutional:       Appearance: She is well-developed  HENT:      Head: Normocephalic and atraumatic  Right Ear: External ear normal  A middle ear effusion is present  Left Ear: External ear normal  A middle ear effusion is present        Nose: Nose normal    Eyes: Conjunctiva/sclera: Conjunctivae normal       Pupils: Pupils are equal, round, and reactive to light  Cardiovascular:      Rate and Rhythm: Normal rate and regular rhythm  Pulses: Intact distal pulses  Heart sounds: Normal heart sounds  No murmur heard  Pulmonary:      Effort: Pulmonary effort is normal  No respiratory distress  Breath sounds: Normal breath sounds  No wheezing  Abdominal:      General: Bowel sounds are normal  There is no distension  Palpations: Abdomen is soft  Tenderness: There is no abdominal tenderness  Musculoskeletal:         General: Normal range of motion  Cervical back: Normal range of motion and neck supple  Skin:     General: Skin is warm  Neurological:      Mental Status: She is alert and oriented to person, place, and time  Cranial Nerves: No cranial nerve deficit  Motor: No abnormal muscle tone        Coordination: Coordination normal       Deep Tendon Reflexes: Reflexes normal    Psychiatric:         Behavior: Behavior normal          Brandon Ortiz MD

## 2021-06-14 NOTE — PATIENT INSTRUCTIONS
Medicare Preventive Visit Patient Instructions  Thank you for completing your Welcome to Medicare Visit or Medicare Annual Wellness Visit today  Your next wellness visit will be due in one year (6/15/2022)  The screening/preventive services that you may require over the next 5-10 years are detailed below  Some tests may not apply to you based off risk factors and/or age  Screening tests ordered at today's visit but not completed yet may show as past due  Also, please note that scanned in results may not display below  Preventive Screenings:  Service Recommendations Previous Testing/Comments   Colorectal Cancer Screening  * Colonoscopy    * Fecal Occult Blood Test (FOBT)/Fecal Immunochemical Test (FIT)  * Fecal DNA/Cologuard Test  * Flexible Sigmoidoscopy Age: 54-65 years old   Colonoscopy: every 10 years (may be performed more frequently if at higher risk)  OR  FOBT/FIT: every 1 year  OR  Cologuard: every 3 years  OR  Sigmoidoscopy: every 5 years  Screening may be recommended earlier than age 48 if at higher risk for colorectal cancer  Also, an individualized decision between you and your healthcare provider will decide whether screening between the ages of 74-80 would be appropriate  Colonoscopy: Not on file  FOBT/FIT: Not on file  Cologuard: 01/24/2019  Sigmoidoscopy: Not on file          Breast Cancer Screening Age: 36 years old  Frequency: every 1-2 years  Not required if history of left and right mastectomy Mammogram: Not on file        Cervical Cancer Screening Between the ages of 21-29, pap smear recommended once every 3 years  Between the ages of 33-67, can perform pap smear with HPV co-testing every 5 years     Recommendations may differ for women with a history of total hysterectomy, cervical cancer, or abnormal pap smears in past  Pap Smear: Not on file    Screening Not Indicated   Hepatitis C Screening Once for adults born between [facility]  More frequently in patients at high risk for Hepatitis C Hep C Antibody: Not on file        Diabetes Screening 1-2 times per year if you're at risk for diabetes or have pre-diabetes Fasting glucose: 88 mg/dL   A1C: No results in last 5 years    Screening Current   Cholesterol Screening Once every 5 years if you don't have a lipid disorder  May order more often based on risk factors  Lipid panel: 09/22/2020    Screening Not Indicated  History Lipid Disorder     Other Preventive Screenings Covered by Medicare:  1  Abdominal Aortic Aneurysm (AAA) Screening: covered once if your at risk  You're considered to be at risk if you have a family history of AAA  2  Lung Cancer Screening: covers low dose CT scan once per year if you meet all of the following conditions: (1) Age 50-69; (2) No signs or symptoms of lung cancer; (3) Current smoker or have quit smoking within the last 15 years; (4) You have a tobacco smoking history of at least 30 pack years (packs per day multiplied by number of years you smoked); (5) You get a written order from a healthcare provider  3  Glaucoma Screening: covered annually if you're considered high risk: (1) You have diabetes OR (2) Family history of glaucoma OR (3)  aged 48 and older OR (3)  American aged 72 and older  3  Osteoporosis Screening: covered every 2 years if you meet one of the following conditions: (1) You're estrogen deficient and at risk for osteoporosis based off medical history and other findings; (2) Have a vertebral abnormality; (3) On glucocorticoid therapy for more than 3 months; (4) Have primary hyperparathyroidism; (5) On osteoporosis medications and need to assess response to drug therapy  · Last bone density test (DXA Scan): Not on file  5  HIV Screening: covered annually if you're between the age of 12-76  Also covered annually if you are younger than 13 and older than 72 with risk factors for HIV infection   For pregnant patients, it is covered up to 3 times per pregnancy  Immunizations:  Immunization Recommendations   Influenza Vaccine Annual influenza vaccination during flu season is recommended for all persons aged >= 6 months who do not have contraindications   Pneumococcal Vaccine (Prevnar and Pneumovax)  * Prevnar = PCV13  * Pneumovax = PPSV23   Adults 25-60 years old: 1-3 doses may be recommended based on certain risk factors  Adults 72 years old: Prevnar (PCV13) vaccine recommended followed by Pneumovax (PPSV23) vaccine  If already received PPSV23 since turning 65, then PCV13 recommended at least one year after PPSV23 dose  Hepatitis B Vaccine 3 dose series if at intermediate or high risk (ex: diabetes, end stage renal disease, liver disease)   Tetanus (Td) Vaccine - COST NOT COVERED BY MEDICARE PART B Following completion of primary series, a booster dose should be given every 10 years to maintain immunity against tetanus  Td may also be given as tetanus wound prophylaxis  Tdap Vaccine - COST NOT COVERED BY MEDICARE PART B Recommended at least once for all adults  For pregnant patients, recommended with each pregnancy  Shingles Vaccine (Shingrix) - COST NOT COVERED BY MEDICARE PART B  2 shot series recommended in those aged 48 and above     Health Maintenance Due:      Topic Date Due    Hepatitis C Screening  Never done    Colorectal Cancer Screening  01/24/2022     Immunizations Due:      Topic Date Due    Influenza Vaccine (Season Ended) 09/01/2021     Advance Directives   What are advance directives? Advance directives are legal documents that state your wishes and plans for medical care  These plans are made ahead of time in case you lose your ability to make decisions for yourself  Advance directives can apply to any medical decision, such as the treatments you want, and if you want to donate organs  What are the types of advance directives? There are many types of advance directives, and each state has rules about how to use them   You may choose a combination of any of the following:  · Living will: This is a written record of the treatment you want  You can also choose which treatments you do not want, which to limit, and which to stop at a certain time  This includes surgery, medicine, IV fluid, and tube feedings  · Durable power of  for healthcare [facility]): This is a written record that states who you want to make healthcare choices for you when you are unable to make them for yourself  This person, called a proxy, is usually a family member or a friend  You may choose more than 1 proxy  · Do not resuscitate (DNR) order:  A DNR order is used in case your heart stops beating or you stop breathing  It is a request not to have certain forms of treatment, such as CPR  A DNR order may be included in other types of advance directives  · Medical directive: This covers the care that you want if you are in a coma, near death, or unable to make decisions for yourself  You can list the treatments you want for each condition  Treatment may include pain medicine, surgery, blood transfusions, dialysis, IV or tube feedings, and a ventilator (breathing machine)  · Values history: This document has questions about your views, beliefs, and how you feel and think about life  This information can help others choose the care that you would choose  Why are advance directives important? An advance directive helps you control your care  Although spoken wishes may be used, it is better to have your wishes written down  Spoken wishes can be misunderstood, or not followed  Treatments may be given even if you do not want them  An advance directive may make it easier for your family to make difficult choices about your care  Urinary Incontinence   Urinary incontinence (UI)  is when you lose control of your bladder  UI develops because your bladder cannot store or empty urine properly   The 3 most common types of UI are stress incontinence, urge incontinence, or both  Medicines:   · May be given to help strengthen your bladder control  Report any side effects of medication to your healthcare provider  Do pelvic muscle exercises often:  Your pelvic muscles help you stop urinating  Squeeze these muscles tight for 5 seconds, then relax for 5 seconds  Gradually work up to squeezing for 10 seconds  Do 3 sets of 15 repetitions a day, or as directed  This will help strengthen your pelvic muscles and improve bladder control  Train your bladder:  Go to the bathroom at set times, such as every 2 hours, even if you do not feel the urge to go  You can also try to hold your urine when you feel the urge to go  For example, hold your urine for 5 minutes when you feel the urge to go  As that becomes easier, hold your urine for 10 minutes  Self-care:   · Keep a UI record  Write down how often you leak urine and how much you leak  Make a note of what you were doing when you leaked urine  · Drink liquids as directed  You may need to limit the amount of liquid you drink to help control your urine leakage  Do not drink any liquid right before you go to bed  Limit or do not have drinks that contain caffeine or alcohol  · Prevent constipation  Eat a variety of high-fiber foods  Good examples are high-fiber cereals, beans, vegetables, and whole-grain breads  Walking is the best way to trigger your intestines to have a bowel movement  · Exercise regularly and maintain a healthy weight  Weight loss and exercise will decrease pressure on your bladder and help you control your leakage  · Use a catheter as directed  to help empty your bladder  A catheter is a tiny, plastic tube that is put into your bladder to drain your urine  · Go to behavior therapy as directed  Behavior therapy may be used to help you learn to control your urge to urinate      Weight Management   Why it is important to manage your weight:  Being overweight increases your risk of health conditions such as heart disease, high blood pressure, type 2 diabetes, and certain types of cancer  It can also increase your risk for osteoarthritis, sleep apnea, and other respiratory problems  Aim for a slow, steady weight loss  Even a small amount of weight loss can lower your risk of health problems  How to lose weight safely:  A safe and healthy way to lose weight is to eat fewer calories and get regular exercise  You can lose up about 1 pound a week by decreasing the number of calories you eat by 500 calories each day  Healthy meal plan for weight management:  A healthy meal plan includes a variety of foods, contains fewer calories, and helps you stay healthy  A healthy meal plan includes the following:  · Eat whole-grain foods more often  A healthy meal plan should contain fiber  Fiber is the part of grains, fruits, and vegetables that is not broken down by your body  Whole-grain foods are healthy and provide extra fiber in your diet  Some examples of whole-grain foods are whole-wheat breads and pastas, oatmeal, brown rice, and bulgur  · Eat a variety of vegetables every day  Include dark, leafy greens such as spinach, kale, arslan greens, and mustard greens  Eat yellow and orange vegetables such as carrots, sweet potatoes, and winter squash  · Eat a variety of fruits every day  Choose fresh or canned fruit (canned in its own juice or light syrup) instead of juice  Fruit juice has very little or no fiber  · Eat low-fat dairy foods  Drink fat-free (skim) milk or 1% milk  Eat fat-free yogurt and low-fat cottage cheese  Try low-fat cheeses such as mozzarella and other reduced-fat cheeses  · Choose meat and other protein foods that are low in fat  Choose beans or other legumes such as split peas or lentils  Choose fish, skinless poultry (chicken or turkey), or lean cuts of red meat (beef or pork)  Before you cook meat or poultry, cut off any visible fat  · Use less fat and oil    Try baking foods instead of frying them  Add less fat, such as margarine, sour cream, regular salad dressing and mayonnaise to foods  Eat fewer high-fat foods  Some examples of high-fat foods include french fries, doughnuts, ice cream, and cakes  · Eat fewer sweets  Limit foods and drinks that are high in sugar  This includes candy, cookies, regular soda, and sweetened drinks  Exercise:  Exercise at least 30 minutes per day on most days of the week  Some examples of exercise include walking, biking, dancing, and swimming  You can also fit in more physical activity by taking the stairs instead of the elevator or parking farther away from stores  Ask your healthcare provider about the best exercise plan for you  © Copyright [employer] 2018 Information is for End User's use only and may not be sold, redistributed or otherwise used for commercial purposes   All illustrations and images included in CareNotes® are the copyrighted property of A D A M , Inc  or [address] [place] [address]

## 2021-06-23 ENCOUNTER — TELEPHONE (OUTPATIENT)
Dept: FAMILY MEDICINE CLINIC | Facility: CLINIC | Age: 76
End: 2021-06-23

## 2021-06-23 ENCOUNTER — APPOINTMENT (OUTPATIENT)
Dept: LAB | Facility: CLINIC | Age: 76
End: 2021-06-23
Payer: COMMERCIAL

## 2021-06-23 DIAGNOSIS — E03.9 HYPOTHYROIDISM, UNSPECIFIED TYPE: ICD-10-CM

## 2021-06-23 DIAGNOSIS — I48.91 ATRIAL FIBRILLATION, UNSPECIFIED TYPE (HCC): ICD-10-CM

## 2021-06-23 DIAGNOSIS — I10 BENIGN ESSENTIAL HYPERTENSION: ICD-10-CM

## 2021-06-23 DIAGNOSIS — E53.8 B12 DEFICIENCY: ICD-10-CM

## 2021-06-23 DIAGNOSIS — E55.9 VITAMIN D DEFICIENCY: ICD-10-CM

## 2021-06-23 LAB
25(OH)D3 SERPL-MCNC: 29.1 NG/ML (ref 30–100)
ALBUMIN SERPL BCP-MCNC: 3.6 G/DL (ref 3.5–5)
ALP SERPL-CCNC: 69 U/L (ref 46–116)
ALT SERPL W P-5'-P-CCNC: 35 U/L (ref 12–78)
ANION GAP SERPL CALCULATED.3IONS-SCNC: 7 MMOL/L (ref 4–13)
AST SERPL W P-5'-P-CCNC: 23 U/L (ref 5–45)
BASOPHILS # BLD AUTO: 0.05 THOUSANDS/ΜL (ref 0–0.1)
BASOPHILS NFR BLD AUTO: 1 % (ref 0–1)
BILIRUB SERPL-MCNC: 0.57 MG/DL (ref 0.2–1)
BUN SERPL-MCNC: 14 MG/DL (ref 5–25)
CALCIUM SERPL-MCNC: 9.6 MG/DL (ref 8.3–10.1)
CHLORIDE SERPL-SCNC: 102 MMOL/L (ref 100–108)
CO2 SERPL-SCNC: 28 MMOL/L (ref 21–32)
CREAT SERPL-MCNC: 0.93 MG/DL (ref 0.6–1.3)
EOSINOPHIL # BLD AUTO: 0.28 THOUSAND/ΜL (ref 0–0.61)
EOSINOPHIL NFR BLD AUTO: 4 % (ref 0–6)
ERYTHROCYTE [DISTWIDTH] IN BLOOD BY AUTOMATED COUNT: 13.2 % (ref 11.6–15.1)
GFR SERPL CREATININE-BSD FRML MDRD: 60 ML/MIN/1.73SQ M
GLUCOSE SERPL-MCNC: 86 MG/DL (ref 65–140)
HCT VFR BLD AUTO: 44.8 % (ref 34.8–46.1)
HGB BLD-MCNC: 14.5 G/DL (ref 11.5–15.4)
IMM GRANULOCYTES # BLD AUTO: 0.03 THOUSAND/UL (ref 0–0.2)
IMM GRANULOCYTES NFR BLD AUTO: 0 % (ref 0–2)
LYMPHOCYTES # BLD AUTO: 0.74 THOUSANDS/ΜL (ref 0.6–4.47)
LYMPHOCYTES NFR BLD AUTO: 10 % (ref 14–44)
MCH RBC QN AUTO: 32.9 PG (ref 26.8–34.3)
MCHC RBC AUTO-ENTMCNC: 32.4 G/DL (ref 31.4–37.4)
MCV RBC AUTO: 102 FL (ref 82–98)
MONOCYTES # BLD AUTO: 0.84 THOUSAND/ΜL (ref 0.17–1.22)
MONOCYTES NFR BLD AUTO: 12 % (ref 4–12)
NEUTROPHILS # BLD AUTO: 5.2 THOUSANDS/ΜL (ref 1.85–7.62)
NEUTS SEG NFR BLD AUTO: 73 % (ref 43–75)
NRBC BLD AUTO-RTO: 0 /100 WBCS
PLATELET # BLD AUTO: 262 THOUSANDS/UL (ref 149–390)
PMV BLD AUTO: 10.7 FL (ref 8.9–12.7)
POTASSIUM SERPL-SCNC: 4 MMOL/L (ref 3.5–5.3)
PROT SERPL-MCNC: 7.7 G/DL (ref 6.4–8.2)
RBC # BLD AUTO: 4.41 MILLION/UL (ref 3.81–5.12)
SODIUM SERPL-SCNC: 137 MMOL/L (ref 136–145)
TSH SERPL DL<=0.05 MIU/L-ACNC: 2.08 UIU/ML (ref 0.36–3.74)
VIT B12 SERPL-MCNC: 1987 PG/ML (ref 100–900)
WBC # BLD AUTO: 7.14 THOUSAND/UL (ref 4.31–10.16)

## 2021-06-23 PROCEDURE — 82306 VITAMIN D 25 HYDROXY: CPT

## 2021-06-23 PROCEDURE — 36415 COLL VENOUS BLD VENIPUNCTURE: CPT

## 2021-06-23 PROCEDURE — 85025 COMPLETE CBC W/AUTO DIFF WBC: CPT

## 2021-06-23 PROCEDURE — 80053 COMPREHEN METABOLIC PANEL: CPT

## 2021-06-23 PROCEDURE — 82607 VITAMIN B-12: CPT

## 2021-06-23 PROCEDURE — 84443 ASSAY THYROID STIM HORMONE: CPT

## 2021-06-23 NOTE — TELEPHONE ENCOUNTER
----- Message from Kelly Snellen, MD sent at 6/23/2021  3:51 PM EDT -----  Labs are stable; however would like her to increase Vit D to 2000 units daily

## 2021-12-13 ENCOUNTER — OFFICE VISIT (OUTPATIENT)
Dept: FAMILY MEDICINE CLINIC | Facility: CLINIC | Age: 76
End: 2021-12-13
Payer: COMMERCIAL

## 2021-12-13 VITALS
SYSTOLIC BLOOD PRESSURE: 112 MMHG | RESPIRATION RATE: 16 BRPM | TEMPERATURE: 97.7 F | HEART RATE: 66 BPM | DIASTOLIC BLOOD PRESSURE: 60 MMHG | BODY MASS INDEX: 45.36 KG/M2 | WEIGHT: 225 LBS | HEIGHT: 59 IN

## 2021-12-13 DIAGNOSIS — E03.9 HYPOTHYROIDISM, UNSPECIFIED TYPE: ICD-10-CM

## 2021-12-13 DIAGNOSIS — I10 BENIGN ESSENTIAL HYPERTENSION: Primary | ICD-10-CM

## 2021-12-13 DIAGNOSIS — R10.9 ABDOMINAL PAIN, UNSPECIFIED ABDOMINAL LOCATION: ICD-10-CM

## 2021-12-13 DIAGNOSIS — E78.5 HYPERLIPIDEMIA, UNSPECIFIED HYPERLIPIDEMIA TYPE: ICD-10-CM

## 2021-12-13 DIAGNOSIS — I48.0 PAROXYSMAL ATRIAL FIBRILLATION (HCC): ICD-10-CM

## 2021-12-13 DIAGNOSIS — G47.33 OBSTRUCTIVE SLEEP APNEA: ICD-10-CM

## 2021-12-13 DIAGNOSIS — Z23 NEED FOR VACCINATION: ICD-10-CM

## 2021-12-13 PROCEDURE — 3078F DIAST BP <80 MM HG: CPT | Performed by: FAMILY MEDICINE

## 2021-12-13 PROCEDURE — 1036F TOBACCO NON-USER: CPT | Performed by: FAMILY MEDICINE

## 2021-12-13 PROCEDURE — 99214 OFFICE O/P EST MOD 30 MIN: CPT | Performed by: FAMILY MEDICINE

## 2021-12-13 PROCEDURE — G0008 ADMIN INFLUENZA VIRUS VAC: HCPCS

## 2021-12-13 PROCEDURE — 3074F SYST BP LT 130 MM HG: CPT | Performed by: FAMILY MEDICINE

## 2021-12-13 PROCEDURE — 1160F RVW MEDS BY RX/DR IN RCRD: CPT | Performed by: FAMILY MEDICINE

## 2021-12-13 PROCEDURE — 3008F BODY MASS INDEX DOCD: CPT | Performed by: FAMILY MEDICINE

## 2021-12-13 PROCEDURE — 90662 IIV NO PRSV INCREASED AG IM: CPT

## 2021-12-16 ENCOUNTER — APPOINTMENT (OUTPATIENT)
Dept: LAB | Facility: CLINIC | Age: 76
End: 2021-12-16
Payer: COMMERCIAL

## 2021-12-16 DIAGNOSIS — E03.9 HYPOTHYROIDISM, UNSPECIFIED TYPE: ICD-10-CM

## 2021-12-16 DIAGNOSIS — E78.5 HYPERLIPIDEMIA, UNSPECIFIED HYPERLIPIDEMIA TYPE: ICD-10-CM

## 2021-12-16 DIAGNOSIS — I10 BENIGN ESSENTIAL HYPERTENSION: ICD-10-CM

## 2021-12-16 LAB
ALBUMIN SERPL BCP-MCNC: 3.4 G/DL (ref 3.5–5)
ALP SERPL-CCNC: 70 U/L (ref 46–116)
ALT SERPL W P-5'-P-CCNC: 38 U/L (ref 12–78)
ANION GAP SERPL CALCULATED.3IONS-SCNC: 7 MMOL/L (ref 4–13)
AST SERPL W P-5'-P-CCNC: 24 U/L (ref 5–45)
BASOPHILS # BLD AUTO: 0.05 THOUSANDS/ΜL (ref 0–0.1)
BASOPHILS NFR BLD AUTO: 1 % (ref 0–1)
BILIRUB SERPL-MCNC: 0.59 MG/DL (ref 0.2–1)
BUN SERPL-MCNC: 23 MG/DL (ref 5–25)
CALCIUM ALBUM COR SERPL-MCNC: 10 MG/DL (ref 8.3–10.1)
CALCIUM SERPL-MCNC: 9.5 MG/DL (ref 8.3–10.1)
CHLORIDE SERPL-SCNC: 106 MMOL/L (ref 100–108)
CHOLEST SERPL-MCNC: 157 MG/DL
CO2 SERPL-SCNC: 26 MMOL/L (ref 21–32)
CREAT SERPL-MCNC: 0.91 MG/DL (ref 0.6–1.3)
EOSINOPHIL # BLD AUTO: 0.21 THOUSAND/ΜL (ref 0–0.61)
EOSINOPHIL NFR BLD AUTO: 3 % (ref 0–6)
ERYTHROCYTE [DISTWIDTH] IN BLOOD BY AUTOMATED COUNT: 13.8 % (ref 11.6–15.1)
GFR SERPL CREATININE-BSD FRML MDRD: 61 ML/MIN/1.73SQ M
GLUCOSE P FAST SERPL-MCNC: 90 MG/DL (ref 65–99)
HCT VFR BLD AUTO: 41.6 % (ref 34.8–46.1)
HDLC SERPL-MCNC: 45 MG/DL
HGB BLD-MCNC: 13.6 G/DL (ref 11.5–15.4)
IMM GRANULOCYTES # BLD AUTO: 0.02 THOUSAND/UL (ref 0–0.2)
IMM GRANULOCYTES NFR BLD AUTO: 0 % (ref 0–2)
LDLC SERPL CALC-MCNC: 86 MG/DL (ref 0–100)
LYMPHOCYTES # BLD AUTO: 0.5 THOUSANDS/ΜL (ref 0.6–4.47)
LYMPHOCYTES NFR BLD AUTO: 7 % (ref 14–44)
MCH RBC QN AUTO: 32.7 PG (ref 26.8–34.3)
MCHC RBC AUTO-ENTMCNC: 32.7 G/DL (ref 31.4–37.4)
MCV RBC AUTO: 100 FL (ref 82–98)
MONOCYTES # BLD AUTO: 0.87 THOUSAND/ΜL (ref 0.17–1.22)
MONOCYTES NFR BLD AUTO: 13 % (ref 4–12)
NEUTROPHILS # BLD AUTO: 5.14 THOUSANDS/ΜL (ref 1.85–7.62)
NEUTS SEG NFR BLD AUTO: 76 % (ref 43–75)
NONHDLC SERPL-MCNC: 112 MG/DL
NRBC BLD AUTO-RTO: 0 /100 WBCS
PLATELET # BLD AUTO: 228 THOUSANDS/UL (ref 149–390)
PMV BLD AUTO: 10.2 FL (ref 8.9–12.7)
POTASSIUM SERPL-SCNC: 4.2 MMOL/L (ref 3.5–5.3)
PROT SERPL-MCNC: 6.9 G/DL (ref 6.4–8.2)
RBC # BLD AUTO: 4.16 MILLION/UL (ref 3.81–5.12)
SODIUM SERPL-SCNC: 139 MMOL/L (ref 136–145)
TRIGL SERPL-MCNC: 132 MG/DL
TSH SERPL DL<=0.05 MIU/L-ACNC: 1.84 UIU/ML (ref 0.36–3.74)
WBC # BLD AUTO: 6.79 THOUSAND/UL (ref 4.31–10.16)

## 2021-12-16 PROCEDURE — 84443 ASSAY THYROID STIM HORMONE: CPT

## 2021-12-16 PROCEDURE — 85025 COMPLETE CBC W/AUTO DIFF WBC: CPT

## 2021-12-16 PROCEDURE — 80061 LIPID PANEL: CPT

## 2021-12-16 PROCEDURE — 80053 COMPREHEN METABOLIC PANEL: CPT

## 2021-12-16 PROCEDURE — 36415 COLL VENOUS BLD VENIPUNCTURE: CPT

## 2021-12-20 ENCOUNTER — TELEPHONE (OUTPATIENT)
Dept: FAMILY MEDICINE CLINIC | Facility: CLINIC | Age: 76
End: 2021-12-20

## 2021-12-21 ENCOUNTER — TELEPHONE (OUTPATIENT)
Dept: FAMILY MEDICINE CLINIC | Facility: CLINIC | Age: 76
End: 2021-12-21

## 2022-01-03 ENCOUNTER — OFFICE VISIT (OUTPATIENT)
Dept: FAMILY MEDICINE CLINIC | Facility: CLINIC | Age: 77
End: 2022-01-03
Payer: COMMERCIAL

## 2022-01-03 VITALS
WEIGHT: 228 LBS | TEMPERATURE: 97.5 F | HEART RATE: 72 BPM | BODY MASS INDEX: 45.96 KG/M2 | HEIGHT: 59 IN | SYSTOLIC BLOOD PRESSURE: 140 MMHG | RESPIRATION RATE: 16 BRPM | DIASTOLIC BLOOD PRESSURE: 70 MMHG

## 2022-01-03 DIAGNOSIS — M25.562 ACUTE PAIN OF LEFT KNEE: Primary | ICD-10-CM

## 2022-01-03 DIAGNOSIS — I10 BENIGN ESSENTIAL HYPERTENSION: ICD-10-CM

## 2022-01-03 DIAGNOSIS — I48.0 PAROXYSMAL ATRIAL FIBRILLATION (HCC): ICD-10-CM

## 2022-01-03 PROCEDURE — 99214 OFFICE O/P EST MOD 30 MIN: CPT | Performed by: FAMILY MEDICINE

## 2022-01-03 NOTE — PROGRESS NOTES
Faiza Salas 1945 female MRN: [id number]    [facility] OFFICE VISIT  [facility] - [address]      ASSESSMENT/PLAN  Faiza Salas is a 77 y o  female presents to the office for    Diagnoses and all orders for this visit:    Acute pain of left knee  -     Ambulatory referral to Orthopedic Surgery; Future    Paroxysmal atrial fibrillation (HCC)    Benign essential hypertension       Left knee pain likely secondary to arthritis/baker cyst inflammation  Patient likely in the setting of osteoarthritis given her BMI  Patient encouraged for weight loss  Advised her that I would like her to speak with Orthopedic for possible steroid injection and likely initiate physical therapy afterwards  Patient with a history of AFib therefore limited to only taking Tylenol  AFib currently controlled  Hypertension currently controlled as well  Avoid all NSAIDs if possible         No future appointments  SUBJECTIVE  CC: Knee Pain (pt c/o left knee pain started Wed)      HPI:  Faiza Salas is a 77 y o  female who presents for acute appointment  Patient has had worsening left knee pain for several weeks  States that she has pain in the back of her knee as well as on the top  States that she is unable to walk  Has been taking Tylenol given her history of heart disease  She is taking all her her medications as prescribed without any difficulties  Denies any recent trauma  Review of Systems   Constitutional: Negative for activity change, appetite change, chills, fatigue and fever  HENT: Negative for congestion  Respiratory: Negative for cough, chest tightness and shortness of breath  Cardiovascular: Negative for chest pain and leg swelling  Gastrointestinal: Negative for abdominal distention, abdominal pain, constipation, diarrhea, nausea and vomiting  Musculoskeletal: Positive for arthralgias  All other systems reviewed and are negative        Historical Information   The patient history was reviewed as follows:  Past Medical History:   Diagnosis Date    Coronary artery disease     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HCC)     bullous    Hypertension     Obesity     Sarcoidosis     SOB (shortness of breath)          Medications:     Current Outpatient Medications:     apixaban (ELIQUIS) 5 mg, Take 5 mg by mouth 2 (two) times a day, Disp: , Rfl:     atorvastatin (LIPITOR) 10 mg tablet, Take 1 tablet (10 mg total) by mouth daily, Disp: 90 tablet, Rfl: 2    cholecalciferol (VITAMIN D3) 1,000 units tablet, Take 1,000 Units by mouth daily, Disp: , Rfl:     Cyanocobalamin (Vitamin B 12) 100 MCG LOZG, Take by mouth, Disp: , Rfl:     eplerenone (INSPRA) 50 MG tablet, Take 50 mg by mouth daily, Disp: , Rfl:     furosemide (LASIX) 40 mg tablet, Take 40 mg by mouth daily, Disp: , Rfl: 0    glucosamine-chondroitin 500-400 MG tablet, Take 1 tablet by mouth daily , Disp: , Rfl:     levothyroxine 50 mcg tablet, Take 1 tablet (50 mcg total) by mouth daily, Disp: 90 tablet, Rfl: 2    losartan (COZAAR) 25 mg tablet, , Disp: , Rfl: 0    metoprolol succinate (TOPROL-XL) 50 mg 24 hr tablet, Take 1 tablet (50 mg total) by mouth daily, Disp: 90 tablet, Rfl: 2    Multiple Vitamins-Minerals (HAIR SKIN AND NAILS FORMULA PO), Take by mouth, Disp: , Rfl:     multivitamin (THERAGRAN) TABS, Take 1 tablet by mouth daily, Disp: , Rfl:     sacubitril-valsartan (Entresto) 49-51 MG TABS, Take 1 tablet by mouth 2 (two) times a day, Disp: 90 tablet, Rfl: 1    ramipril (ALTACE) 2 5 mg capsule, Take 2 5 mg by mouth (Patient not taking: Reported on 1/3/2022 ), Disp: , Rfl:     tinidazole (TINDAMAX) 500 MG tablet, Take 2,000 mg by mouth daily (Patient not taking: Reported on 12/13/2021 ), Disp: , Rfl: 0    No Known Allergies    OBJECTIVE  Vitals:   Vitals:    01/03/22 1540   BP: 140/70   BP Location: Right arm   Patient Position: Sitting   Cuff Size: Adult   Pulse: 72   Resp: 16   Temp: 97 5 °F (36 4 °C)   TempSrc: Temporal   Weight: 103 kg (228 lb)   Height: 4' 10 5" (1 486 m)         Physical Exam  Vitals reviewed  Constitutional:       Appearance: She is well-developed  HENT:      Head: Normocephalic and atraumatic  Eyes:      Conjunctiva/sclera: Conjunctivae normal       Pupils: Pupils are equal, round, and reactive to light  Cardiovascular:      Rate and Rhythm: Normal rate and regular rhythm  Heart sounds: Normal heart sounds  Pulmonary:      Effort: Pulmonary effort is normal  No respiratory distress  Breath sounds: Normal breath sounds  Musculoskeletal:         General: Normal range of motion  Cervical back: Normal range of motion and neck supple  Left knee: Swelling present  Tenderness (Behind the kneecap baker cyst swelling) present over the patellar tendon  Skin:     General: Skin is warm  Capillary Refill: Capillary refill takes less than 2 seconds  Neurological:      Mental Status: She is alert and oriented to person, place, and time                      Gely Irizarry MD,   [facility]  1/3/2022

## 2022-01-14 ENCOUNTER — OFFICE VISIT (OUTPATIENT)
Dept: OBGYN CLINIC | Facility: CLINIC | Age: 77
End: 2022-01-14
Payer: COMMERCIAL

## 2022-01-14 ENCOUNTER — APPOINTMENT (OUTPATIENT)
Dept: RADIOLOGY | Facility: CLINIC | Age: 77
End: 2022-01-14
Payer: COMMERCIAL

## 2022-01-14 VITALS
DIASTOLIC BLOOD PRESSURE: 79 MMHG | HEIGHT: 59 IN | SYSTOLIC BLOOD PRESSURE: 139 MMHG | HEART RATE: 96 BPM | WEIGHT: 228 LBS | BODY MASS INDEX: 45.96 KG/M2

## 2022-01-14 DIAGNOSIS — Z01.89 ENCOUNTER FOR LOWER EXTREMITY COMPARISON IMAGING STUDY: ICD-10-CM

## 2022-01-14 DIAGNOSIS — M25.562 ACUTE PAIN OF LEFT KNEE: ICD-10-CM

## 2022-01-14 DIAGNOSIS — M17.12 PRIMARY LOCALIZED OSTEOARTHRITIS OF LEFT KNEE: Primary | ICD-10-CM

## 2022-01-14 DIAGNOSIS — M17.11 PRIMARY LOCALIZED OSTEOARTHRITIS OF RIGHT KNEE: ICD-10-CM

## 2022-01-14 PROCEDURE — 73562 X-RAY EXAM OF KNEE 3: CPT

## 2022-01-14 PROCEDURE — 1160F RVW MEDS BY RX/DR IN RCRD: CPT | Performed by: ORTHOPAEDIC SURGERY

## 2022-01-14 PROCEDURE — 99204 OFFICE O/P NEW MOD 45 MIN: CPT | Performed by: ORTHOPAEDIC SURGERY

## 2022-01-14 PROCEDURE — 1036F TOBACCO NON-USER: CPT | Performed by: ORTHOPAEDIC SURGERY

## 2022-01-14 PROCEDURE — 3078F DIAST BP <80 MM HG: CPT | Performed by: ORTHOPAEDIC SURGERY

## 2022-01-14 PROCEDURE — 73560 X-RAY EXAM OF KNEE 1 OR 2: CPT

## 2022-01-14 PROCEDURE — 3075F SYST BP GE 130 - 139MM HG: CPT | Performed by: ORTHOPAEDIC SURGERY

## 2022-01-14 NOTE — PROGRESS NOTES
Assessment/Plan:  1  Primary localized osteoarthritis of left knee  Ambulatory referral to Orthopedic Surgery    XR knee 3 vw left non injury   2  Primary localized osteoarthritis of right knee  XR knee 1 or 2 vw right       Afsanehuvamador Ward has bilateral knee pain consistent with osteoarthritis  She likely suffered an exacerbation of the osteoarthritis and had some swelling in the knee several weeks ago  That seems to have resolved  While she does have medial joint line pain and osteoarthritis on her x-ray she does not feel that she has any pain at rest if I do not touch her knees  I did  her on possibility of cortisone injection or physical therapy if the pain would persist   She verbalized understanding this plan I would like to wait until the pain becomes more severe to undergo any other intervention  She will follow up in the office as needed  Subjective: David Sapp is a 77 y o  female who presents to the office for evaluation for left-sided knee pain  She states that she injured her left knee about 3 weeks ago trying to climb into her daughter's large truck  Afterwards she felt increased pain and swelling in the left knee  She saw her primary care physician who referred to see me today  She has been taking Tylenol and resting the knee and states that the knee feels much better today  She only has mild discomfort over the medial aspect of both knees  This bothers her at times when she is going up and down stairs but it does not bother her when she is walking  She states there is no pain today  Review of Systems   Constitutional: Negative for chills, fever and unexpected weight change  HENT: Negative for hearing loss, nosebleeds and sore throat  Eyes: Negative for pain, redness and visual disturbance  Respiratory: Negative for cough, shortness of breath and wheezing  Cardiovascular: Negative for chest pain, palpitations and leg swelling     Gastrointestinal: Negative for abdominal pain, nausea and vomiting  Endocrine: Negative for polydipsia and polyuria  Genitourinary: Negative for dysuria and hematuria  Musculoskeletal:        See HPI   Skin: Negative for rash and wound  Neurological: Negative for dizziness, numbness and headaches  Psychiatric/Behavioral: Negative for decreased concentration and suicidal ideas  The patient is not nervous/anxious            Past Medical History:   Diagnosis Date    Coronary artery disease     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    Hypertension     Obesity     Sarcoidosis     SOB (shortness of breath)        Past Surgical History:   Procedure Laterality Date    CARDIAC SURGERY  2017    CATARACT EXTRACTION Left 2019    CYST REMOVAL      Lipoma    DILATION AND CURETTAGE OF UTERUS      EYE SURGERY      INSERT / REPLACE / REMOVE PACEMAKER  2017    OVARIAN CYST REMOVAL         Family History   Problem Relation Age of Onset    Emphysema Father     Cancer Sister         ovarian    Heart attack Brother     Heart disease Family        Social History     Occupational History    Not on file   Tobacco Use    Smoking status: Former Smoker     Packs/day: 1 50     Years: 40 00     Pack years: 60 00     Quit date:      Years since quittin 0    Smokeless tobacco: Never Used   Vaping Use    Vaping Use: Never used   Substance and Sexual Activity    Alcohol use: Yes     Comment: rare    Drug use: No    Sexual activity: Not on file         Current Outpatient Medications:     apixaban (ELIQUIS) 5 mg, Take 5 mg by mouth 2 (two) times a day, Disp: , Rfl:     atorvastatin (LIPITOR) 10 mg tablet, Take 1 tablet (10 mg total) by mouth daily, Disp: 90 tablet, Rfl: 2    cholecalciferol (VITAMIN D3) 1,000 units tablet, Take 1,000 Units by mouth daily, Disp: , Rfl:     Cyanocobalamin (Vitamin B 12) 100 MCG LOZG, Take by mouth, Disp: , Rfl:     eplerenone (INSPRA) 50 MG tablet, Take 50 mg by mouth daily, Disp: , Rfl:    furosemide (LASIX) 40 mg tablet, Take 40 mg by mouth daily, Disp: , Rfl: 0    glucosamine-chondroitin 500-400 MG tablet, Take 1 tablet by mouth daily , Disp: , Rfl:     levothyroxine 50 mcg tablet, Take 1 tablet (50 mcg total) by mouth daily, Disp: 90 tablet, Rfl: 2    losartan (COZAAR) 25 mg tablet, , Disp: , Rfl: 0    metoprolol succinate (TOPROL-XL) 50 mg 24 hr tablet, Take 1 tablet (50 mg total) by mouth daily, Disp: 90 tablet, Rfl: 2    Multiple Vitamins-Minerals (HAIR SKIN AND NAILS FORMULA PO), Take by mouth, Disp: , Rfl:     multivitamin (THERAGRAN) TABS, Take 1 tablet by mouth daily, Disp: , Rfl:     sacubitril-valsartan (Entresto) 49-51 MG TABS, Take 1 tablet by mouth 2 (two) times a day, Disp: 90 tablet, Rfl: 1    ramipril (ALTACE) 2 5 mg capsule, Take 2 5 mg by mouth (Patient not taking: Reported on 1/3/2022 ), Disp: , Rfl:     tinidazole (TINDAMAX) 500 MG tablet, Take 2,000 mg by mouth daily (Patient not taking: Reported on 12/13/2021 ), Disp: , Rfl: 0    No Known Allergies    Objective:  Vitals:    01/14/22 0941   BP: 139/79   Pulse: 96       Right Knee Exam     Tenderness   The patient is experiencing tenderness in the medial joint line  Range of Motion   Extension: normal   Flexion:  130 normal     Other   Erythema: absent  Sensation: normal  Pulse: present  Swelling: none  Effusion: no effusion present      Left Knee Exam     Tenderness   The patient is experiencing tenderness in the medial joint line  Range of Motion   Extension: normal   Flexion:  130 normal     Other   Erythema: absent  Sensation: normal  Pulse: present  Swelling: none (Difficulty discerning effusion secondary to patient's body habitus)  Effusion: no effusion present          Observations   Left Knee   Negative for effusion  Right Knee   Negative for effusion  Physical Exam  Vitals and nursing note reviewed  Constitutional:       Appearance: She is well-developed     HENT:      Head: Normocephalic and atraumatic  Eyes:      General: No scleral icterus  Conjunctiva/sclera: Conjunctivae normal    Cardiovascular:      Rate and Rhythm: Normal rate  Pulmonary:      Effort: Pulmonary effort is normal  No respiratory distress  Musculoskeletal:      Cervical back: Normal range of motion and neck supple  Right knee: No effusion  Left knee: No effusion  Comments: As noted in HPI   Skin:     General: Skin is warm and dry  Neurological:      Mental Status: She is alert and oriented to person, place, and time  Psychiatric:         Behavior: Behavior normal          I have personally reviewed pertinent films in PACS and my interpretation is as follows: Three-view x-rays of bilateral knees demonstrate moderate to severe medial compartment narrowing consistent with osteoarthritis    No evidence of acute fracture

## 2022-03-02 DIAGNOSIS — E03.9 HYPOTHYROIDISM, UNSPECIFIED TYPE: ICD-10-CM

## 2022-03-02 RX ORDER — LEVOTHYROXINE SODIUM 0.05 MG/1
TABLET ORAL
Qty: 90 TABLET | Refills: 2 | Status: SHIPPED | OUTPATIENT
Start: 2022-03-02

## 2022-04-20 ENCOUNTER — OFFICE VISIT (OUTPATIENT)
Dept: FAMILY MEDICINE CLINIC | Facility: CLINIC | Age: 77
End: 2022-04-20
Payer: COMMERCIAL

## 2022-04-20 VITALS
SYSTOLIC BLOOD PRESSURE: 136 MMHG | HEART RATE: 96 BPM | DIASTOLIC BLOOD PRESSURE: 84 MMHG | RESPIRATION RATE: 22 BRPM | TEMPERATURE: 97.4 F | BODY MASS INDEX: 44.96 KG/M2 | HEIGHT: 59 IN | WEIGHT: 223 LBS

## 2022-04-20 DIAGNOSIS — L02.12 BOIL OF NECK: Primary | ICD-10-CM

## 2022-04-20 PROCEDURE — 99213 OFFICE O/P EST LOW 20 MIN: CPT | Performed by: FAMILY MEDICINE

## 2022-04-20 RX ORDER — SULFAMETHOXAZOLE AND TRIMETHOPRIM 800; 160 MG/1; MG/1
1 TABLET ORAL EVERY 12 HOURS SCHEDULED
Qty: 14 TABLET | Refills: 0 | Status: SHIPPED | OUTPATIENT
Start: 2022-04-20 | End: 2022-04-27

## 2022-04-20 NOTE — PROGRESS NOTES
Chief Complaint   Patient presents with    sore on neck         Patient ID: Deepa Rajput is a 77 y o  female  HPI  Pt is seeing for 2 boils on the neck x 5 days -  Tried to squeeze them  -  Not better -  painful -  No drainage     The following portions of the patient's history were reviewed and updated as appropriate: allergies, current medications, past family history, past medical history, past social history, past surgical history and problem list     Review of Systems   Constitutional: Negative  Respiratory: Negative  Cardiovascular: Negative  Current Outpatient Medications   Medication Sig Dispense Refill    apixaban (ELIQUIS) 5 mg Take 5 mg by mouth 2 (two) times a day      atorvastatin (LIPITOR) 10 mg tablet Take 1 tablet (10 mg total) by mouth daily 90 tablet 2    cholecalciferol (VITAMIN D3) 1,000 units tablet Take 1,000 Units by mouth daily      Cyanocobalamin (Vitamin B 12) 100 MCG LOZG Take by mouth      eplerenone (INSPRA) 50 MG tablet Take 50 mg by mouth daily      furosemide (LASIX) 40 mg tablet Take 40 mg by mouth daily  0    glucosamine-chondroitin 500-400 MG tablet Take 1 tablet by mouth daily       levothyroxine 50 mcg tablet take 1 tablet by mouth once daily 90 tablet 2    losartan (COZAAR) 25 mg tablet   0    metoprolol succinate (TOPROL-XL) 50 mg 24 hr tablet Take 1 tablet (50 mg total) by mouth daily 90 tablet 2    Multiple Vitamins-Minerals (HAIR SKIN AND NAILS FORMULA PO) Take by mouth      multivitamin (THERAGRAN) TABS Take 1 tablet by mouth daily      sacubitril-valsartan (Entresto) 49-51 MG TABS Take 1 tablet by mouth 2 (two) times a day 90 tablet 1    ramipril (ALTACE) 2 5 mg capsule Take 2 5 mg by mouth (Patient not taking: Reported on 1/3/2022 )      tinidazole (TINDAMAX) 500 MG tablet Take 2,000 mg by mouth daily (Patient not taking: Reported on 12/13/2021 )  0     No current facility-administered medications for this visit         Objective:    BP 136/84   Pulse 96   Temp (!) 97 4 °F (36 3 °C)   Resp 22   Ht 4' 10 5" (1 486 m)   Wt 101 kg (223 lb)   BMI 45 81 kg/m²        Physical Exam  Skin:     Findings: Lesion (has 2 boild on the neck -  posterior aspect and R side  2 cm in d ) present  Assessment/Plan:         Diagnoses and all orders for this visit:    Boil of neck  -     Ambulatory Referral to General Surgery; Future  -     sulfamethoxazole-trimethoprim (BACTRIM DS) 800-160 mg per tablet; Take 1 tablet by mouth every 12 (twelve) hours for 7 days  -     mupirocin (BACTROBAN) 2 % ointment;  Apply topically 3 (three) times a day          rto prn                   Anne Krause MD

## 2022-04-21 ENCOUNTER — CONSULT (OUTPATIENT)
Dept: SURGERY | Facility: CLINIC | Age: 77
End: 2022-04-21
Payer: COMMERCIAL

## 2022-04-21 VITALS
OXYGEN SATURATION: 96 % | HEIGHT: 59 IN | WEIGHT: 222.4 LBS | HEART RATE: 103 BPM | SYSTOLIC BLOOD PRESSURE: 126 MMHG | DIASTOLIC BLOOD PRESSURE: 74 MMHG | TEMPERATURE: 97.6 F | BODY MASS INDEX: 44.84 KG/M2

## 2022-04-21 DIAGNOSIS — L30.9 ECZEMA: ICD-10-CM

## 2022-04-21 DIAGNOSIS — I25.5 ISCHEMIC CARDIOMYOPATHY: ICD-10-CM

## 2022-04-21 DIAGNOSIS — J43.9 EMPHYSEMA LUNG (HCC): ICD-10-CM

## 2022-04-21 DIAGNOSIS — L02.12 BOIL OF NECK: ICD-10-CM

## 2022-04-21 DIAGNOSIS — I27.20 PULMONARY HYPERTENSION (HCC): ICD-10-CM

## 2022-04-21 DIAGNOSIS — G47.33 OBSTRUCTIVE SLEEP APNEA: ICD-10-CM

## 2022-04-21 DIAGNOSIS — L72.3 INFECTED SEBACEOUS CYST OF SKIN: Primary | ICD-10-CM

## 2022-04-21 DIAGNOSIS — Z79.01 CURRENT USE OF LONG TERM ANTICOAGULATION: ICD-10-CM

## 2022-04-21 DIAGNOSIS — L08.9 INFECTED SEBACEOUS CYST OF SKIN: Primary | ICD-10-CM

## 2022-04-21 DIAGNOSIS — R06.2 WHEEZE: ICD-10-CM

## 2022-04-21 DIAGNOSIS — I48.91 ATRIAL FIBRILLATION (HCC): ICD-10-CM

## 2022-04-21 DIAGNOSIS — G47.34 HYPOXIA, SLEEP RELATED: ICD-10-CM

## 2022-04-21 DIAGNOSIS — E66.01 MORBID OBESITY, UNSPECIFIED OBESITY TYPE (HCC): ICD-10-CM

## 2022-04-21 DIAGNOSIS — R06.02 SHORTNESS OF BREATH: ICD-10-CM

## 2022-04-21 DIAGNOSIS — D86.9 SARCOIDOSIS: ICD-10-CM

## 2022-04-21 PROBLEM — M54.2 NECK PAIN: Status: ACTIVE | Noted: 2022-04-21

## 2022-04-21 PROCEDURE — 1036F TOBACCO NON-USER: CPT | Performed by: SPECIALIST

## 2022-04-21 PROCEDURE — 10060 I&D ABSCESS SIMPLE/SINGLE: CPT | Performed by: SPECIALIST

## 2022-04-21 PROCEDURE — 87070 CULTURE OTHR SPECIMN AEROBIC: CPT | Performed by: SPECIALIST

## 2022-04-21 PROCEDURE — 87205 SMEAR GRAM STAIN: CPT | Performed by: SPECIALIST

## 2022-04-21 PROCEDURE — 99204 OFFICE O/P NEW MOD 45 MIN: CPT | Performed by: SPECIALIST

## 2022-04-21 PROCEDURE — 87186 SC STD MICRODIL/AGAR DIL: CPT | Performed by: SPECIALIST

## 2022-04-21 PROCEDURE — 3074F SYST BP LT 130 MM HG: CPT | Performed by: SPECIALIST

## 2022-04-21 PROCEDURE — 3078F DIAST BP <80 MM HG: CPT | Performed by: SPECIALIST

## 2022-04-21 PROCEDURE — 1160F RVW MEDS BY RX/DR IN RCRD: CPT | Performed by: SPECIALIST

## 2022-04-21 RX ORDER — BACITRACIN, NEOMYCIN, POLYMYXIN B 400; 3.5; 5 [USP'U]/G; MG/G; [USP'U]/G
OINTMENT TOPICAL 2 TIMES DAILY
Qty: 15 G | Refills: 0 | Status: SHIPPED | OUTPATIENT
Start: 2022-04-21

## 2022-04-21 NOTE — PROGRESS NOTES
Incision and Drainage    Date/Time: 4/21/2022 4:06 PM  Performed by: Troy Mckee MD  Authorized by: Troy Mckee MD   Universal Protocol:  Consent: Written consent obtained  Risks and benefits: risks, benefits and alternatives were discussed  Consent given by: patient  Time out: Immediately prior to procedure a "time out" was called to verify the correct patient, procedure, equipment, support staff and site/side marked as required  Timeout called at: 4/21/2022 4:06 PM   Patient understanding: patient states understanding of the procedure being performed  Patient consent: the patient's understanding of the procedure matches consent given  Procedure consent: procedure consent matches procedure scheduled  Relevant documents: relevant documents present and verified  Test results: test results not available  Site marked: the operative site was marked  Radiology Images displayed and confirmed  If images not available, report reviewed: imaging studies not available  Patient identity confirmed: verbally with patient      Patient location:  Clinic  Location:     Type:  Abscess    Location:  Head/neck    Head/neck location:  Neck  Pre-procedure details:     Skin preparation:  Chloraprep  Anesthesia (see MAR for exact dosages): Anesthesia method:  Local infiltration    Local anesthetic:  Lidocaine 1% WITH epi  Procedure details:     Complexity:  Simple    Incision types:  Stab incision and single with marsupialization    Scalpel blade:  11    Approach:  Open    Incision depth:  Subcutaneous and deep    Wound management:  Probed and deloculated, irrigated with saline and extensive cleaning    Drainage:  Purulent    Drainage amount: Moderate    Wound treatment:  Packing placed    Packing materials:  1/4 in iodoform gauze  Post-procedure details:     Patient tolerance of procedure: Tolerated well, no immediate complications  Comments:       This procedure was done for 2 abscesses on the neck

## 2022-04-21 NOTE — ASSESSMENT & PLAN NOTE
Incision and drainage  Packing with iodoform  Continue antibiotic  Follow-up next Friday  Pus for culture

## 2022-04-21 NOTE — PROGRESS NOTES
History and Physical Examination - General Surgery   [facility]  Klaus Terry 77 y o  female MRN: [id number]  Unit/Bed#:  Encounter: [id number] PCP: Valentino Hutching, MD    History of Present Illness   Chief Complaint:   Neck abscess  Neck pain     HPI:  Klaus Terry is a 77 y o  female who presents to office with history of sebaceous cyst which got infected which is causing her throbbing pain on the neck mostly on the left side  Patient has 2 lesions 1 on the right side which has decreased in size since antibiotic  Back of the neck has increased in size is more red and causing me worse pain    Extensive history of CAD atrial fibrillation ischemic cardiomyopathy on medical anticoagulation with Eliquis  Patient took her dose this morning    Extensive history of shortness of breath/pulmonary sarcoidosis/sleep apnea and morbid obesity    No known drug allergies patient is on Bactrim started since yesterday    Historical Information   The following portions of the patient's history were reviewed and updated as appropriate  Past Medical History:   Diagnosis Date    Coronary artery disease     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    Hypertension     Obesity     Sarcoidosis     SOB (shortness of breath)      Past Surgical History:   Procedure Laterality Date    CARDIAC SURGERY  2017    CATARACT EXTRACTION Left 2019    CYST REMOVAL      Lipoma    DILATION AND CURETTAGE OF UTERUS      EYE SURGERY      INSERT / Ganesh Fernandez / Yovany Hoffer PACEMAKER  2017    OVARIAN CYST REMOVAL       Social History   Social History     Substance and Sexual Activity   Alcohol Use Yes    Comment: rare     Social History     Substance and Sexual Activity   Drug Use No     Social History     Tobacco Use   Smoking Status Former Smoker    Packs/day: 1 50    Years: 40 00    Pack years: 60 00    Quit date: 26    Years since quittin 3   Smokeless Tobacco Never Used     Family History:   Family History   Problem Relation Age of Onset    Emphysema Father     Cancer Sister         ovarian    Heart attack Brother     Heart disease Family        Meds/Allergies   No Known Allergies    Current Outpatient Medications:     apixaban (ELIQUIS) 5 mg, Take 5 mg by mouth 2 (two) times a day, Disp: , Rfl:     atorvastatin (LIPITOR) 10 mg tablet, Take 1 tablet (10 mg total) by mouth daily, Disp: 90 tablet, Rfl: 2    cholecalciferol (VITAMIN D3) 1,000 units tablet, Take 1,000 Units by mouth daily, Disp: , Rfl:     Cyanocobalamin (Vitamin B 12) 100 MCG LOZG, Take by mouth, Disp: , Rfl:     eplerenone (INSPRA) 50 MG tablet, Take 50 mg by mouth daily, Disp: , Rfl:     furosemide (LASIX) 40 mg tablet, Take 40 mg by mouth daily, Disp: , Rfl: 0    glucosamine-chondroitin 500-400 MG tablet, Take 1 tablet by mouth daily , Disp: , Rfl:     levothyroxine 50 mcg tablet, take 1 tablet by mouth once daily, Disp: 90 tablet, Rfl: 2    losartan (COZAAR) 25 mg tablet, , Disp: , Rfl: 0    metoprolol succinate (TOPROL-XL) 50 mg 24 hr tablet, Take 1 tablet (50 mg total) by mouth daily, Disp: 90 tablet, Rfl: 2    Multiple Vitamins-Minerals (HAIR SKIN AND NAILS FORMULA PO), Take by mouth, Disp: , Rfl:     multivitamin (THERAGRAN) TABS, Take 1 tablet by mouth daily, Disp: , Rfl:     mupirocin (BACTROBAN) 2 % ointment, Apply topically 3 (three) times a day, Disp: 22 g, Rfl: 0    sacubitril-valsartan (Entresto) 49-51 MG TABS, Take 1 tablet by mouth 2 (two) times a day, Disp: 90 tablet, Rfl: 1    sulfamethoxazole-trimethoprim (BACTRIM DS) 800-160 mg per tablet, Take 1 tablet by mouth every 12 (twelve) hours for 7 days, Disp: 14 tablet, Rfl: 0    ramipril (ALTACE) 2 5 mg capsule, Take 2 5 mg by mouth (Patient not taking: Reported on 1/3/2022 ), Disp: , Rfl:     tinidazole (TINDAMAX) 500 MG tablet, Take 2,000 mg by mouth daily (Patient not taking: Reported on 12/13/2021 ), Disp: , Rfl: 0     REVIEW OF SYSTEMS  Constitutional: Denies fever or chills   Eyes:  Denies change in visual acuity   HENT:  Denies nasal congestion or sore throat   Multiple swelling and abscesses/boil on my and neck causing me throbbing pain on my neck  Mostly on the left side radiating to my shoulder  I have a history of eczema  Respiratory:  Complaining of shortness of breath   Cardiovascular:  Denies chest pain or edema   GI:  Denies abdominal pain, nausea, vomiting, bloody stools or diarrhea   :  Denies dysuria, frequency, difficulty in micturition and nocturia  Musculoskeletal:  Extensive history of lumbar disc disorder with myelopathy and back pain   Neurologic:  Denies headache, focal weakness or sensory changes   Endocrine:  Denies polyuria or polydipsia   Lymphatic:  Denies swollen glands   Psychiatric:  Denies depression or anxiety     Objective   Current Vitals:   /74 (BP Location: Left arm, Patient Position: Sitting, Cuff Size: Large)   Pulse 103   Temp 97 6 °F (36 4 °C) (Core)   Ht 4' 10 5" (1 486 m)   Wt 101 kg (222 lb 6 4 oz)   SpO2 96%   BMI 45 69 kg/m²   Body mass index is 45 69 kg/m²  PHYSICAL EXAMS  General:  Patient is not in acute distress, still short of breath cannot lay on the bed who shortness of breath , awake, alert responding to commands,   HEENT:  Both pupils normal-size atraumatic, normocephalic, nonicteric    Right side of the neck over carotid approximately 4 x 3 cm size the infected sebaceous cyst  Is red inflamed markedly tender with necrotic up punctum in the middle    Left side of the neck over the back approximately 6 cm x 4 cm infected sebaceous cyst markedly inflamed red markedly tender and the pain is radiating towards her left shoulder  Restricted movement    Neck:  JVP  raised  Trachea central  Respiratory:  normal Breath sounds with obvious wheezes,  Cardiovascular:  S1-S2 irregularly irregular without any murmur   GI:  Abdomen soft nontender    Protuberant morbidly obese  Musculoskeletal:  Extensive history of back pain  Integument:  History of a skin rash and eczema  Lymphatic:  No cervical lymphadenopathy  Neurologic:  Patient is awake alert, responding to command, well-oriented to time and place and person moving all extremities ambulating well  Unable to lay down secondary to shortness of breath  Has some wheezes  Morbidly obese    Visit Diagnosis:   Diagnoses and all orders for this visit:    Infected sebaceous cyst of skin  -     Wound culture and Gram stain; Future  -     Wound culture and Gram stain    Boil of neck  -     Ambulatory Referral to General Surgery  -     Wound culture and Gram stain; Future  -     Wound culture and Gram stain    Atrial fibrillation (HCC)  -     Wound culture and Gram stain; Future  -     Wound culture and Gram stain    Ischemic cardiomyopathy  -     Wound culture and Gram stain; Future  -     Wound culture and Gram stain    Pulmonary hypertension (HCC)  -     Wound culture and Gram stain; Future  -     Wound culture and Gram stain    Emphysema lung (HCC)  -     Wound culture and Gram stain; Future  -     Wound culture and Gram stain    Obstructive sleep apnea  -     Wound culture and Gram stain; Future  -     Wound culture and Gram stain    Hypoxia, sleep related  -     Wound culture and Gram stain; Future  -     Wound culture and Gram stain    Sarcoidosis  -     Wound culture and Gram stain; Future  -     Wound culture and Gram stain    Shortness of breath  -     Wound culture and Gram stain; Future  -     Wound culture and Gram stain    Wheeze  -     Wound culture and Gram stain; Future  -     Wound culture and Gram stain    Eczema  -     Wound culture and Gram stain; Future  -     Wound culture and Gram stain    Morbid obesity, unspecified obesity type ([address] )  -     Wound culture and Gram stain; Future  -     Wound culture and Gram stain    Current use of long term anticoagulation  -     Wound culture and Gram stain;  Future  -     Wound culture and Gram stain       Plan of care was discussed with patient in detail    Pertinent labs reviewed  Pertinent images and available reads personally reviewed  No results found  Pertinent notes reviewed    Assessment/Plan   Assessment:  Multiple infected sebaceous cyst with abscess  Morbid obesity  On medical anticoagulation for atrial fibrillation  Extensive cardiac history  Extensive history of COPD and the sleep apnea and shortness of Breath    Plan:  Proper consent was obtained  The risks, benefits, alternatives,and probabilities of success were discussed in detail with no guarantee made as to outcome  All questions were answered to the patient's satisfaction  Explained about excessive bleeding in view of a patient's intake of her medication Eliquis this morning    Med patient wants to have the abscess drained in view of a severe pain and the neck stiffness and radiating towards her left shoulder    Plan incision drainage at the bedside with the high risk of bleeding explained to the patient  Culture from the abscess  Packing of abscess with quarter-inch iodoform  Continue Bactrim DS 1 tablet twice a day as prescribed by medical  DC packing after 2 days and Local dressing with Neosporin ointment  Follow-up in 1 week    Patient was made aware if the bleeding occurs or symptoms get worse to go to ER  patient was made aware that patient will need a formal removal of cyst once the infection settles down in 4-6 weeks time    Counseling / Coordination of Care  Expained patient family in detailed about coordination of care  A description of the counseling / coordination of care:  I performed an interim history, pertinent images and labs, performed a physical examination to arrive at the plan delineated above with associated thought processes  MD Kartik Sanders    Office   Tel  [phone]  Fax   [phone]

## 2022-04-23 LAB
BACTERIA WND AEROBE CULT: ABNORMAL
GRAM STN SPEC: ABNORMAL
GRAM STN SPEC: ABNORMAL

## 2022-04-26 ENCOUNTER — TELEPHONE (OUTPATIENT)
Dept: SURGERY | Facility: CLINIC | Age: 77
End: 2022-04-26

## 2022-04-26 NOTE — TELEPHONE ENCOUNTER
Patient had I & D Abscess on 4/21/2022  Patient states she is doing well and does not have any questions or concerns about office visit

## 2022-04-29 ENCOUNTER — OFFICE VISIT (OUTPATIENT)
Dept: SURGERY | Facility: CLINIC | Age: 77
End: 2022-04-29
Payer: COMMERCIAL

## 2022-04-29 VITALS — HEIGHT: 59 IN | WEIGHT: 219.2 LBS | TEMPERATURE: 96.4 F | BODY MASS INDEX: 44.19 KG/M2

## 2022-04-29 DIAGNOSIS — L72.3 INFECTED SEBACEOUS CYST OF SKIN: ICD-10-CM

## 2022-04-29 DIAGNOSIS — E66.01 MORBID OBESITY, UNSPECIFIED OBESITY TYPE (HCC): ICD-10-CM

## 2022-04-29 DIAGNOSIS — Z48.89 ENCOUNTER FOR SURGICAL FOLLOW-UP CARE: Primary | ICD-10-CM

## 2022-04-29 DIAGNOSIS — L08.9 INFECTED SEBACEOUS CYST OF SKIN: ICD-10-CM

## 2022-04-29 PROCEDURE — 99212 OFFICE O/P EST SF 10 MIN: CPT | Performed by: SPECIALIST

## 2022-04-29 NOTE — PROGRESS NOTES
General Surgery Office Visit Follow up   [facility]  Patient: Walter Lora   : 1945 Sex: female MRN: [id number]   CSN: [id number] PCP: Orlin Munguia MD    Assessment/ Plan: Walter Lora is a 77 y o  female  day(s) POD # 2 week  S/p s/p the I&D of a abscess on the back of neck  Encounter for surgical follow-up care [Z48 [id number]]    Plan  Stable postop   Follow-up in 4 weeks if residual cyst for excision    SUBJECTIVE:   Doing well wound is nicely healed  OBJECTIVE:  No complaints  No fever no chills no rigors  Tolerating p o   Diet well  Normal bowel movement no constipation or diarrhea  Ambulating well   Vitals:   Temp (!) 96 4 °F (35 8 °C) (Core)   Ht 4' 10 5" (1 486 m)   Wt 99 4 kg (219 lb 3 2 oz)   BMI 45 03 kg/m²     Active medications:    Current Outpatient Medications:     apixaban (ELIQUIS) 5 mg, Take 5 mg by mouth 2 (two) times a day, Disp: , Rfl:     atorvastatin (LIPITOR) 10 mg tablet, Take 1 tablet (10 mg total) by mouth daily, Disp: 90 tablet, Rfl: 2    cholecalciferol (VITAMIN D3) 1,000 units tablet, Take 1,000 Units by mouth daily, Disp: , Rfl:     Cyanocobalamin (Vitamin B 12) 100 MCG LOZG, Take by mouth, Disp: , Rfl:     eplerenone (INSPRA) 50 MG tablet, Take 50 mg by mouth daily, Disp: , Rfl:     furosemide (LASIX) 40 mg tablet, Take 40 mg by mouth daily, Disp: , Rfl: 0    glucosamine-chondroitin 500-400 MG tablet, Take 1 tablet by mouth daily , Disp: , Rfl:     levothyroxine 50 mcg tablet, take 1 tablet by mouth once daily, Disp: 90 tablet, Rfl: 2    losartan (COZAAR) 25 mg tablet, , Disp: , Rfl: 0    metoprolol succinate (TOPROL-XL) 50 mg 24 hr tablet, Take 1 tablet (50 mg total) by mouth daily, Disp: 90 tablet, Rfl: 2    Multiple Vitamins-Minerals (HAIR SKIN AND NAILS FORMULA PO), Take by mouth, Disp: , Rfl:     multivitamin (THERAGRAN) TABS, Take 1 tablet by mouth daily, Disp: , Rfl:     sacubitril-valsartan (Entresto) 49-51 MG TABS, Take 1 tablet by mouth 2 (two) times a day, Disp: 90 tablet, Rfl: 1    mupirocin (BACTROBAN) 2 % ointment, Apply topically 3 (three) times a day (Patient not taking: Reported on 4/29/2022 ), Disp: 22 g, Rfl: 0    neomycin-bacitracin-polymyxin b (NEOSPORIN) ointment, Apply topically 2 (two) times a day (Patient not taking: Reported on 4/29/2022 ), Disp: 15 g, Rfl: 0    ramipril (ALTACE) 2 5 mg capsule, Take 2 5 mg by mouth (Patient not taking: Reported on 1/3/2022 ), Disp: , Rfl:     tinidazole (TINDAMAX) 500 MG tablet, Take 2,000 mg by mouth daily (Patient not taking: Reported on 12/13/2021 ), Disp: , Rfl: 0    Physical Exam:   General Alert awake   Normocephalic atraumatic PERRLA   Lungs clear bilaterally  Cardiac normal S1 normal S2  Abdomen soft,non tender Bowel sounds present  Skin: surgical dressing is C/D/I  Ext: No clubbing, cyanosis, edema  Surgical wound well healed    Visit Diagnosis:   Diagnoses and all orders for this visit:    Encounter for surgical follow-up care    Morbid obesity, unspecified obesity type ([facility], [address] )    Infected sebaceous cyst of skin       Plan of care was discussed with patient in detail    Pertinent labs reviewed  Most Recent Labs:   Consult on 04/21/2022   Component Date Value Ref Range Status    Wound Culture 04/21/2022 3+ Growth of Staphylococcus aureus*  Final    Gram Stain Result 04/21/2022 No polys seen*  Final    Gram Stain Result 04/21/2022 2+ Gram positive cocci in clusters*  Final       Pertinent images and available reads personally reviewed  No results found  Pertinent notes reviewed    Counseling / Coordination of Care  Total Office time /unit time spent today 15minutes  Greater than 50% of total time was spent with the patient and / or family counseling and / or coordination of care   A description of the counseling / coordination of care:  I performed an interim history, pertinent images and labs, performed a physical examination to arrive at the plan delineated above with associated thought processes  Ciarra Hough MD MS FRCS FACS  [facility]  04/29/22 10:06 AM        Portions of the record may have been created with voice recognition software  Occasional wrong word or "sound a like" substitutions may have occurred due to the inherent limitations of voice recognition software  Read the chart carefully and recognize, using context, where substitutions have occurred

## 2022-05-27 ENCOUNTER — OFFICE VISIT (OUTPATIENT)
Dept: SURGERY | Facility: CLINIC | Age: 77
End: 2022-05-27
Payer: COMMERCIAL

## 2022-05-27 VITALS — WEIGHT: 223.2 LBS | HEIGHT: 59 IN | BODY MASS INDEX: 45 KG/M2 | TEMPERATURE: 97.4 F

## 2022-05-27 DIAGNOSIS — L08.9 INFECTED SEBACEOUS CYST OF SKIN: ICD-10-CM

## 2022-05-27 DIAGNOSIS — Z79.01 CURRENT USE OF LONG TERM ANTICOAGULATION: ICD-10-CM

## 2022-05-27 DIAGNOSIS — E66.01 MORBID OBESITY, UNSPECIFIED OBESITY TYPE (HCC): Primary | ICD-10-CM

## 2022-05-27 DIAGNOSIS — I48.91 ATRIAL FIBRILLATION (HCC): ICD-10-CM

## 2022-05-27 DIAGNOSIS — L72.3 INFECTED SEBACEOUS CYST OF SKIN: ICD-10-CM

## 2022-05-27 DIAGNOSIS — D23.39 DERMOID CYST OF SKIN OF NOSE: ICD-10-CM

## 2022-05-27 PROCEDURE — 1036F TOBACCO NON-USER: CPT | Performed by: SPECIALIST

## 2022-05-27 PROCEDURE — 99213 OFFICE O/P EST LOW 20 MIN: CPT | Performed by: SPECIALIST

## 2022-05-27 PROCEDURE — 1160F RVW MEDS BY RX/DR IN RCRD: CPT | Performed by: SPECIALIST

## 2022-05-27 NOTE — PROGRESS NOTES
General Surgery Office Visit Follow up   [facility]  Patient: Cr Calabrese   : 1945 Sex: female MRN: [id number]   CSN: [id number] PCP: Luisa Glynn MD    Assessment/ Plan: Cr Calabrese is a 69 y o  female  day(s) POD 6weeks  S/p incision and drainage  Of large the infected sebaceous cyst over the neck x2  Morbid obesity, unspecified obesity type ([address] ) [E66 01]    Plan  Stable postop   No residual cyst noted no need of further excision  Patient has the another dermoid cyst over the nose she wants to have excised  But at present due to her heart condition and on Eliquis wants to wait and watch  Will call for appointment     SUBJECTIVE:   Doing very well you to care of my problem the do not have no neck pain  And there is no pimple or lump at the site of operation  OBJECTIVE:  No complaints  No fever no chills no rigors  Tolerating p o   Diet well  Normal bowel movement no constipation or diarrhea  Ambulating well   Vitals:   Temp (!) 97 4 °F (36 3 °C) (Core)   Ht 4' 10 5" (1 486 m)   Wt 101 kg (223 lb 3 2 oz)   BMI 45 85 kg/m²     Active medications:    Current Outpatient Medications:     apixaban (ELIQUIS) 5 mg, Take 5 mg by mouth 2 (two) times a day, Disp: , Rfl:     atorvastatin (LIPITOR) 10 mg tablet, Take 1 tablet (10 mg total) by mouth daily, Disp: 90 tablet, Rfl: 2    cholecalciferol (VITAMIN D3) 1,000 units tablet, Take 1,000 Units by mouth daily, Disp: , Rfl:     Cyanocobalamin (Vitamin B 12) 100 MCG LOZG, Take by mouth, Disp: , Rfl:     eplerenone (INSPRA) 50 MG tablet, Take 50 mg by mouth daily, Disp: , Rfl:     furosemide (LASIX) 40 mg tablet, Take 40 mg by mouth daily, Disp: , Rfl: 0    glucosamine-chondroitin 500-400 MG tablet, Take 1 tablet by mouth daily , Disp: , Rfl:     levothyroxine 50 mcg tablet, take 1 tablet by mouth once daily, Disp: 90 tablet, Rfl: 2    losartan (COZAAR) 25 mg tablet, , Disp: , Rfl: 0    metoprolol succinate (TOPROL-XL) 50 mg 24 hr tablet, Take 1 tablet (50 mg total) by mouth daily, Disp: 90 tablet, Rfl: 2    Multiple Vitamins-Minerals (HAIR SKIN AND NAILS FORMULA PO), Take by mouth, Disp: , Rfl:     multivitamin (THERAGRAN) TABS, Take 1 tablet by mouth daily, Disp: , Rfl:     sacubitril-valsartan (Entresto) 49-51 MG TABS, Take 1 tablet by mouth 2 (two) times a day, Disp: 90 tablet, Rfl: 1    mupirocin (BACTROBAN) 2 % ointment, Apply topically 3 (three) times a day (Patient not taking: No sig reported), Disp: 22 g, Rfl: 0    neomycin-bacitracin-polymyxin b (NEOSPORIN) ointment, Apply topically 2 (two) times a day (Patient not taking: No sig reported), Disp: 15 g, Rfl: 0    ramipril (ALTACE) 2 5 mg capsule, Take 2 5 mg by mouth (Patient not taking: No sig reported), Disp: , Rfl:     tinidazole (TINDAMAX) 500 MG tablet, Take 2,000 mg by mouth daily (Patient not taking: No sig reported), Disp: , Rfl: 0    Physical Exam:   General Alert awake morbidly obese  Normocephalic atraumatic PERRLA   Lungs clear bilaterally  Cardiac normal S1 normal S2  Abdomen soft,non tender Bowel sounds present  Skin:  Moist  Ext: No clubbing, cyanosis, edema  Surgical wound well healed no residual cyst noted after 6 weeks    Right side of the ala of the nose  0 5 x 0 5 cm cyst nontender no signs of infection  Cheesy material inside possible dermoid cyst of the nose    Visit Diagnosis:   Diagnoses and all orders for this visit:    Morbid obesity, unspecified obesity type (HCC)    Current use of long term anticoagulation    Infected sebaceous cyst of skin    Atrial fibrillation (HCC)    Dermoid cyst of skin of nose       Plan of care was discussed with patient in detail    Pertinent labs reviewed  Most Recent Labs:   No visits with results within 2 Week(s) from this visit     Latest known visit with results is:   Consult on 04/21/2022   Component Date Value Ref Range Status    Wound Culture 04/21/2022 3+ Growth of Staphylococcus aureus (A)  Final    Gram Stain Result 04/21/2022 No polys seen (A)  Final    Gram Stain Result 04/21/2022 2+ Gram positive cocci in clusters (A)  Final       Pertinent images and available reads personally reviewed  No results found  Pertinent notes reviewed    Counseling / Coordination of Care  Total Office time /unit time spent today 15minutes  Greater than 50% of total time was spent with the patient and / or family counseling and / or coordination of care  A description of the counseling / coordination of care:  I performed an interim history, pertinent images and labs, performed a physical examination to arrive at the plan delineated above with associated thought processes  Narinder Kahn MD MS FRCS FACS  [facility]  05/27/22 9:53 AM        Portions of the record may have been created with voice recognition software  Occasional wrong word or "sound a like" substitutions may have occurred due to the inherent limitations of voice recognition software  Read the chart carefully and recognize, using context, where substitutions have occurred

## 2022-05-31 ENCOUNTER — TELEPHONE (OUTPATIENT)
Dept: SURGERY | Facility: CLINIC | Age: 77
End: 2022-05-31

## 2022-06-01 ENCOUNTER — OFFICE VISIT (OUTPATIENT)
Dept: PULMONOLOGY | Facility: MEDICAL CENTER | Age: 77
End: 2022-06-01
Payer: COMMERCIAL

## 2022-06-01 VITALS
HEART RATE: 100 BPM | OXYGEN SATURATION: 90 % | HEIGHT: 59 IN | DIASTOLIC BLOOD PRESSURE: 78 MMHG | TEMPERATURE: 97 F | BODY MASS INDEX: 44.47 KG/M2 | RESPIRATION RATE: 12 BRPM | WEIGHT: 220.6 LBS | SYSTOLIC BLOOD PRESSURE: 132 MMHG

## 2022-06-01 DIAGNOSIS — R06.02 SHORTNESS OF BREATH: Primary | Chronic | ICD-10-CM

## 2022-06-01 DIAGNOSIS — J43.2 CENTRILOBULAR EMPHYSEMA (HCC): Chronic | ICD-10-CM

## 2022-06-01 DIAGNOSIS — D86.9 SARCOIDOSIS: Chronic | ICD-10-CM

## 2022-06-01 PROCEDURE — 99214 OFFICE O/P EST MOD 30 MIN: CPT | Performed by: NURSE PRACTITIONER

## 2022-06-01 NOTE — ASSESSMENT & PLAN NOTE
Patient has been stable  I did review her last CT scan that was ordered in January 2019  There is stable mediastinal and hilar adenopathy and also some scattered infiltrates  At that time there was no evidence of any honeycombing interstitial lung disease  She did have a PFT that was done in January 2019  Patient defers any repeat CT her chest x-ray at this time

## 2022-06-01 NOTE — PROGRESS NOTES
Assessment/Plan:     Problem List Items Addressed This Visit        Respiratory    Centrilobular emphysema ([address] ) (Chronic)     Patient had a completely normal pulmonary function test that was done in January 2019  Diffusion capacity with mildly decreased at 63%  However she did have CT of chest that was done with IV contrast to evaluate a history of sarcoidosis  Mild centrilobular emphysematous changes were noted  Patient has not been on any kind of maintenance therapy or inhaler  We will consider this in the future but she defers any PFT at this time                Other    Sarcoidosis (Chronic)     Patient has been stable  I did review her last CT scan that was ordered in January 2019  There is stable mediastinal and hilar adenopathy and also some scattered infiltrates  At that time there was no evidence of any honeycombing interstitial lung disease  She did have a PFT that was done in January 2019  Patient defers any repeat CT her chest x-ray at this time  Shortness of breath - Primary (Chronic)     Patient has mild exertional dyspnea  She does have ischemic cardiomyopathy  Patient also has elevated body mass index of 45 3  Today there was no evidence of any oxygen desaturation  Oxygen saturation at rest was 97% with ambulating 2 laps was 95%                   Return in about 1 year (around 6/1/2023)  All questions are answered to the patient's satisfaction and understanding  She verbalizes understanding  She is encouraged to call with any further questions or concerns  Portions of the record may have been created with voice recognition software  Occasional wrong word or "sound a like" substitutions may have occurred due to the inherent limitations of voice recognition software  Read the chart carefully and recognize, using context, where substitutions have occurred      Electronically Signed by Isaura Bond CRNP    ______________________________________________________________________    Chief Complaint: No chief complaint on file  Patient ID: [facility] is a 69 y o  y o  female has a past medical history of Coronary artery disease, 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[address] ), Hypertension, Obesity, Sarcoidosis, and SOB (shortness of breath)  6/1/2022  Patient presents today for follow-up visit  HPI  [facility] is a 60-year-old female with history of sarcoidosis and shortness of breath  She also has postnasal drip  She was a former smoker and stop smoking in approximately 35 years ago  Last CT scan with IV contrast was done in January 2019  There were some mild centrilobular emphysematous changes noted there was also areas of discoid scarring and stable mediastinal adenopathy and   ronni pulmonary opacities in keeping with patient's history of sarcoid  Patient did have a complete pulmonary function test in January 2019  Forced vital capacity was 2 18 L or 94% of predicted, FEV1 was 1 61 L or 90% of predicted obstruction ratio was 74%  Total lung capacity was normal at 4 13 L or 96% of predicted residual volume 1 83 L or 91% of predicted there was no change post bronchodilation  Diffusion capacity was slightly decreased at 63% of predicted corrected for alveolar volume  Normal configuration was noted in flow volume loop  Review of Systems   Constitutional: Negative  HENT: Negative  Respiratory: Positive for cough and shortness of breath  Cardiovascular: Negative  Right lower extremity slightly time on without any tenderness to palpation no pitting edema noted  Gastrointestinal: Negative  Endocrine: Negative  Genitourinary: Negative  Musculoskeletal: Negative  Skin: Negative  Allergic/Immunologic: Negative  Neurological: Negative  Hematological: Negative  Psychiatric/Behavioral: Negative  Smoking history: She reports that she quit smoking about 35 years ago   She has a 60 00 pack-year smoking history   She has never used smokeless tobacco     The following portions of the patient's history were reviewed and updated as appropriate: current medications, past family history, past medical history, past social history, past surgical history and problem list     Immunization History   Administered Date(s) Administered    COVID-19 MODERNA VACC 0 5 ML IM 02/25/2021, 03/26/2021    Influenza Split High Dose Preservative Free IM 10/01/2014    Influenza, high dose seasonal 0 7 mL 12/13/2021    Influenza, recombinant, quadrivalent,injectable, preservative free 10/09/2019    Influenza, seasonal, injectable 09/10/2013    Pneumococcal 10/09/2018    Pneumococcal Conjugate 13-Valent 10/09/2017    Pneumococcal Conjugate PCV 7 10/09/2018    Pneumococcal Polysaccharide PPV23 10/01/2014    TD (adult) Preservative Free 10/14/2019     Current Outpatient Medications   Medication Sig Dispense Refill    apixaban (ELIQUIS) 5 mg Take 5 mg by mouth 2 (two) times a day      atorvastatin (LIPITOR) 10 mg tablet Take 1 tablet (10 mg total) by mouth daily 90 tablet 2    cholecalciferol (VITAMIN D3) 1,000 units tablet Take 1,000 Units by mouth daily      Cyanocobalamin (Vitamin B 12) 100 MCG LOZG Take by mouth      eplerenone (INSPRA) 50 MG tablet Take 50 mg by mouth daily      furosemide (LASIX) 40 mg tablet Take 40 mg by mouth daily  0    glucosamine-chondroitin 500-400 MG tablet Take 1 tablet by mouth daily       levothyroxine 50 mcg tablet take 1 tablet by mouth once daily 90 tablet 2    losartan (COZAAR) 25 mg tablet   0    metoprolol succinate (TOPROL-XL) 50 mg 24 hr tablet Take 1 tablet (50 mg total) by mouth daily 90 tablet 2    Multiple Vitamins-Minerals (HAIR SKIN AND NAILS FORMULA PO) Take by mouth      multivitamin (THERAGRAN) TABS Take 1 tablet by mouth daily      sacubitril-valsartan (Entresto) 49-51 MG TABS Take 1 tablet by mouth 2 (two) times a day 90 tablet 1    mupirocin (BACTROBAN) 2 % ointment Apply topically 3 (three) times a day (Patient not taking: No sig reported) 22 g 0    neomycin-bacitracin-polymyxin b (NEOSPORIN) ointment Apply topically 2 (two) times a day (Patient not taking: No sig reported) 15 g 0    ramipril (ALTACE) 2 5 mg capsule Take 2 5 mg by mouth (Patient not taking: No sig reported)      tinidazole (TINDAMAX) 500 MG tablet Take 2,000 mg by mouth daily (Patient not taking: No sig reported)  0     No current facility-administered medications for this visit  Allergies: Patient has no known allergies  Objective:  Vitals:    06/01/22 1020   BP: 132/78   Pulse: 100   Resp: 12   Temp: (!) 97 °F (36 1 °C)   TempSrc: Temporal   SpO2: 90%   Weight: 100 kg (220 lb 9 6 oz)   Height: 4' 10 5" (1 486 m)   Oxygen Therapy  SpO2: 90 %    Wt Readings from Last 3 Encounters:   06/01/22 100 kg (220 lb 9 6 oz)   05/27/22 101 kg (223 lb 3 2 oz)   04/29/22 99 4 kg (219 lb 3 2 oz)     Body mass index is 45 32 kg/m²  Physical Exam  Constitutional:       Appearance: She is obese  HENT:      Head: Normocephalic and atraumatic  Nose: Nose normal       Comments: Small whitish opacity noted in the left in her nostril  Eyes:      Pupils: Pupils are equal, round, and reactive to light  Cardiovascular:      Rate and Rhythm: Normal rate and regular rhythm  Pulses: Normal pulses  Pulmonary:      Effort: Pulmonary effort is normal       Breath sounds: Normal breath sounds  Abdominal:      General: Abdomen is flat  Musculoskeletal:         General: Normal range of motion  Cervical back: Normal range of motion  Skin:     General: Skin is warm and dry  Capillary Refill: Capillary refill takes less than 2 seconds  Neurological:      General: No focal deficit present  Mental Status: She is alert and oriented to person, place, and time     Psychiatric:         Mood and Affect: Mood normal          Behavior: Behavior normal          Lab Review:   Consult on 04/21/2022   Component Date Value    Wound Culture 04/21/2022 3+ Growth of Staphylococcus aureus (A)    Gram Stain Result 04/21/2022 No polys seen (A)    Gram Stain Result 04/21/2022 2+ Gram positive cocci in clusters (A)   Appointment on 12/16/2021   Component Date Value    Sodium 12/16/2021 139     Potassium 12/16/2021 4 2     Chloride 12/16/2021 106     CO2 12/16/2021 26     ANION GAP 12/16/2021 7     BUN 12/16/2021 23     Creatinine 12/16/2021 0 91     Glucose, Fasting 12/16/2021 90     Calcium 12/16/2021 9 5     Corrected Calcium 12/16/2021 10 0     AST 12/16/2021 24     ALT 12/16/2021 38     Alkaline Phosphatase 12/16/2021 70     Total Protein 12/16/2021 6 9     Albumin 12/16/2021 3 4 (A)    Total Bilirubin 12/16/2021 0 59     eGFR 12/16/2021 61     WBC 12/16/2021 6 79     RBC 12/16/2021 4 16     Hemoglobin 12/16/2021 13 6     Hematocrit 12/16/2021 41 6     MCV 12/16/2021 100 (A)    MCH 12/16/2021 32 7     MCHC 12/16/2021 32 7     RDW 12/16/2021 13 8     MPV 12/16/2021 10 2     Platelets 25/03/2653 228     nRBC 12/16/2021 0     Neutrophils Relative 12/16/2021 76 (A)    Immat GRANS % 12/16/2021 0     Lymphocytes Relative 12/16/2021 7 (A)    Monocytes Relative 12/16/2021 13 (A)    Eosinophils Relative 12/16/2021 3     Basophils Relative 12/16/2021 1     Neutrophils Absolute 12/16/2021 5 14     Immature Grans Absolute 12/16/2021 0 02     Lymphocytes Absolute 12/16/2021 0 50 (A)    Monocytes Absolute 12/16/2021 0 87     Eosinophils Absolute 12/16/2021 0 21     Basophils Absolute 12/16/2021 0 05     TSH 3RD GENERATON 12/16/2021 1 840     Cholesterol 12/16/2021 157     Triglycerides 12/16/2021 132     HDL, Direct 12/16/2021 45 (A)    LDL Calculated 12/16/2021 86     Non-HDL-Chol (CHOL-HDL) 12/16/2021 112        Past Surgical History:   Procedure Laterality Date    CARDIAC SURGERY  06/02/2017    CATARACT EXTRACTION Left 07/28/2019    CYST REMOVAL      Lipoma    DILATION AND CURETTAGE OF UTERUS      EYE SURGERY      Jossie Ugalde / Lizzie Taylor / REMOVE PACEMAKER  12/20/2017    OVARIAN CYST REMOVAL          Family History   Problem Relation Age of Onset    Emphysema Father     Cancer Sister         ovarian    Heart attack Brother     Heart disease Family         Diagnostics:  I have personally reviewed pertinent reports  Office Spirometry Results:     ESS:    No results found

## 2022-06-01 NOTE — ASSESSMENT & PLAN NOTE
Patient had a completely normal pulmonary function test that was done in January 2019  Diffusion capacity with mildly decreased at 63%  However she did have CT of chest that was done with IV contrast to evaluate a history of sarcoidosis  Mild centrilobular emphysematous changes were noted  Patient has not been on any kind of maintenance therapy or inhaler    We will consider this in the future but she defers any PFT at this time

## 2022-06-01 NOTE — ASSESSMENT & PLAN NOTE
Patient has mild exertional dyspnea  She does have ischemic cardiomyopathy  Patient also has elevated body mass index of 45 3  Today there was no evidence of any oxygen desaturation    Oxygen saturation at rest was 97% with ambulating 2 laps was 95%

## 2022-06-02 ENCOUNTER — CONSULT (OUTPATIENT)
Dept: SURGERY | Facility: CLINIC | Age: 77
End: 2022-06-02
Payer: COMMERCIAL

## 2022-06-02 VITALS
DIASTOLIC BLOOD PRESSURE: 70 MMHG | TEMPERATURE: 97.6 F | OXYGEN SATURATION: 93 % | HEIGHT: 59 IN | HEART RATE: 82 BPM | SYSTOLIC BLOOD PRESSURE: 120 MMHG | BODY MASS INDEX: 44.88 KG/M2 | WEIGHT: 222.6 LBS

## 2022-06-02 DIAGNOSIS — L72.3 INFECTED SEBACEOUS CYST OF SKIN: Primary | ICD-10-CM

## 2022-06-02 DIAGNOSIS — D23.39 DERMOID CYST OF SKIN OF NOSE: ICD-10-CM

## 2022-06-02 DIAGNOSIS — J34.89 INFECTION OF NOSE: Primary | ICD-10-CM

## 2022-06-02 DIAGNOSIS — L08.9 INFECTED SEBACEOUS CYST OF SKIN: Primary | ICD-10-CM

## 2022-06-02 PROCEDURE — 3078F DIAST BP <80 MM HG: CPT | Performed by: SPECIALIST

## 2022-06-02 PROCEDURE — 1036F TOBACCO NON-USER: CPT | Performed by: SPECIALIST

## 2022-06-02 PROCEDURE — 3074F SYST BP LT 130 MM HG: CPT | Performed by: SPECIALIST

## 2022-06-02 PROCEDURE — 99213 OFFICE O/P EST LOW 20 MIN: CPT | Performed by: SPECIALIST

## 2022-06-02 RX ORDER — CEFADROXIL 500 MG/1
500 CAPSULE ORAL EVERY 12 HOURS SCHEDULED
Qty: 20 CAPSULE | Refills: 0 | Status: SHIPPED | OUTPATIENT
Start: 2022-06-02 | End: 2022-06-12

## 2022-06-02 NOTE — ASSESSMENT & PLAN NOTE
Duricef 500 mg 1 capsule twice a day for 7 days  Local application of mupirocin ointment    Follow in 2 weeks for possible excision of a cyst on the right al of the nose

## 2022-06-02 NOTE — PROGRESS NOTES
General Surgery Office Visit Follow up   [facility]  Patient: Radha Mckinney   : 1945 Sex: female MRN: [id number]   CSN: [id number] PCP: Litzy Paz MD    Assessment/ Plan: Radha Mckinney is a 69 y o  female  day(s) patient developed theNo primary diagnosis found  Plan  Take antibiotics and the mupirocin ointment for for and the nares    Follow-up in 2 weeks for excision of dermoid cyst   right side of nose     SUBJECTIVE:   I have developed the severe sore and the little pimple inside my nose on the left side    I want to get my cyst removed from the right nancy of the nose  OBJECTIVE:  Pain in the nose  No fever no chills no rigors  Tolerating p o   Diet well  Normal bowel movement no constipation or diarrhea  Ambulating well     Vitals:   /70 (BP Location: Left arm, Patient Position: Sitting, Cuff Size: Large)   Pulse 82   Temp 97 6 °F (36 4 °C) (Core)   Ht 4' 10 5" (1 486 m)   Wt 101 kg (222 lb 9 6 oz)   SpO2 93%   BMI 45 73 kg/m²     Active medications:    Current Outpatient Medications:     apixaban (ELIQUIS) 5 mg, Take 5 mg by mouth 2 (two) times a day 2022 stopped on 2022 for Procedure, Disp: , Rfl:     atorvastatin (LIPITOR) 10 mg tablet, Take 1 tablet (10 mg total) by mouth daily, Disp: 90 tablet, Rfl: 2    cholecalciferol (VITAMIN D3) 1,000 units tablet, Take 1,000 Units by mouth daily, Disp: , Rfl:     Cyanocobalamin (Vitamin B 12) 100 MCG LOZG, Take by mouth, Disp: , Rfl:     eplerenone (INSPRA) 50 MG tablet, Take 50 mg by mouth daily, Disp: , Rfl:     furosemide (LASIX) 40 mg tablet, Take 40 mg by mouth daily, Disp: , Rfl: 0    glucosamine-chondroitin 500-400 MG tablet, Take 1 tablet by mouth daily , Disp: , Rfl:     levothyroxine 50 mcg tablet, take 1 tablet by mouth once daily, Disp: 90 tablet, Rfl: 2    losartan (COZAAR) 25 mg tablet, , Disp: , Rfl: 0    metoprolol succinate (TOPROL-XL) 50 mg 24 hr tablet, Take 1 tablet (50 mg total) by mouth daily, Disp: 90 tablet, Rfl: 2    Multiple Vitamins-Minerals (HAIR SKIN AND NAILS FORMULA PO), Take by mouth, Disp: , Rfl:     multivitamin (THERAGRAN) TABS, Take 1 tablet by mouth daily, Disp: , Rfl:     sacubitril-valsartan (Entresto) 49-51 MG TABS, Take 1 tablet by mouth 2 (two) times a day, Disp: 90 tablet, Rfl: 1    cefadroxil (DURICEF) 500 mg capsule, Take 1 capsule (500 mg total) by mouth every 12 (twelve) hours for 10 days (Patient not taking: Reported on 6/2/2022), Disp: 20 capsule, Rfl: 0    mupirocin (BACTROBAN) 2 % ointment, Apply topically 3 (three) times a day (Patient not taking: No sig reported), Disp: 22 g, Rfl: 0    neomycin-bacitracin-polymyxin b (NEOSPORIN) ointment, Apply topically 2 (two) times a day (Patient not taking: No sig reported), Disp: 15 g, Rfl: 0    ramipril (ALTACE) 2 5 mg capsule, Take 2 5 mg by mouth (Patient not taking: No sig reported), Disp: , Rfl:     tinidazole (TINDAMAX) 500 MG tablet, Take 2,000 mg by mouth daily (Patient not taking: No sig reported), Disp: , Rfl: 0    Physical Exam:   General Alert awake   Normocephalic atraumatic PERRLA   Lungs clear bilaterally  Cardiac normal S1 normal S2  Abdomen soft,non tender Bowel sounds present  Skin:  Moist  Ext: No clubbing, cyanosis, edema    Visit Diagnosis:   Diagnoses and all orders for this visit:    Infection of nose    Dermoid cyst of skin of nose       Plan of care was discussed with patient in detail    Pertinent labs reviewed  Most Recent Labs:   No visits with results within 2 Week(s) from this visit     Latest known visit with results is:   Consult on 04/21/2022   Component Date Value Ref Range Status    Wound Culture 04/21/2022 3+ Growth of Staphylococcus aureus (A)  Final    Gram Stain Result 04/21/2022 No polys seen (A)  Final    Gram Stain Result 04/21/2022 2+ Gram positive cocci in clusters (A)  Final       Pertinent images and available reads personally reviewed  No results found  Pertinent notes reviewed    Counseling / Coordination of Care  Total Office time /unit time spent today 15minutes  Greater than 50% of total time was spent with the patient and / or family counseling and / or coordination of care  A description of the counseling / coordination of care:  I performed an interim history, pertinent images and labs, performed a physical examination to arrive at the plan delineated above with associated thought processes  Keara Snider MD MS FRCS FACS  [facility]  06/02/22 4:45 PM        Portions of the record may have been created with voice recognition software  Occasional wrong word or "sound a like" substitutions may have occurred due to the inherent limitations of voice recognition software  Read the chart carefully and recognize, using context, where substitutions have occurred

## 2022-08-27 DIAGNOSIS — I48.91 ATRIAL FIBRILLATION, UNSPECIFIED TYPE (HCC): ICD-10-CM

## 2022-08-27 DIAGNOSIS — E78.5 HYPERLIPIDEMIA, UNSPECIFIED HYPERLIPIDEMIA TYPE: ICD-10-CM

## 2022-08-29 RX ORDER — METOPROLOL SUCCINATE 50 MG/1
TABLET, EXTENDED RELEASE ORAL
Qty: 90 TABLET | Refills: 0 | Status: SHIPPED | OUTPATIENT
Start: 2022-08-29

## 2022-08-29 RX ORDER — ATORVASTATIN CALCIUM 10 MG/1
TABLET, FILM COATED ORAL
Qty: 90 TABLET | Refills: 2 | Status: SHIPPED | OUTPATIENT
Start: 2022-08-29

## 2022-08-29 NOTE — TELEPHONE ENCOUNTER
Due for an AWV ; OK to push for October calm, cooperative, well related  ICU Vital Signs Last 24 Hrs  T(C): 36.2 (01 Jan 2019 02:59), Max: 36.8 (31 Dec 2018 21:42)  T(F): 97.1 (01 Jan 2019 02:59), Max: 98.3 (31 Dec 2018 21:42)  HR: 96 (01 Jan 2019 02:59) (82 - 98)  BP: 96/51 (01 Jan 2019 02:59) (93/70 - 111/67)  BP(mean): --  ABP: --  ABP(mean): --  RR: 18 (01 Jan 2019 02:59) (18 - 18)  SpO2: 100% (01 Jan 2019 02:59) (100% - 100%) childhood asthma (grew out of it, not on medications currently) lives with family in [place], father is medical director of spine services at [employer], at times travels from aunt's house to college as it is closer calm, cooperative, well related    ICU Vital Signs Last 24 Hrs  T(C): 36.7 (11 Oct 2020 05:35), Max: 36.7 (11 Oct 2020 05:35)  T(F): 98 (11 Oct 2020 05:35), Max: 98 (11 Oct 2020 05:35)  HR: 82 (11 Oct 2020 05:35) (82 - 103)  BP: 134/85 (11 Oct 2020 05:35) (132/100 - 134/85)  BP(mean): --  ABP: --  ABP(mean): --  RR: 16 (11 Oct 2020 05:35) (16 - 18)  SpO2: 99% (11 Oct 2020 05:35) (99% - 100%)

## 2022-09-20 ENCOUNTER — RA CDI HCC (OUTPATIENT)
Dept: OTHER | Facility: HOSPITAL | Age: 77
End: 2022-09-20

## 2022-09-20 NOTE — PROGRESS NOTES
[facility]  coding opportunities       Chart reviewed, no opportunity found:   [address]        Patients Insurance     Medicare Insurance: [facility]

## 2022-10-18 ENCOUNTER — APPOINTMENT (OUTPATIENT)
Dept: LAB | Facility: CLINIC | Age: 77
End: 2022-10-18
Payer: COMMERCIAL

## 2022-11-01 ENCOUNTER — OFFICE VISIT (OUTPATIENT)
Dept: FAMILY MEDICINE CLINIC | Facility: CLINIC | Age: 77
End: 2022-11-01

## 2022-11-01 VITALS
OXYGEN SATURATION: 98 % | HEART RATE: 96 BPM | TEMPERATURE: 98 F | HEIGHT: 59 IN | RESPIRATION RATE: 16 BRPM | SYSTOLIC BLOOD PRESSURE: 126 MMHG | BODY MASS INDEX: 44.35 KG/M2 | WEIGHT: 220 LBS | DIASTOLIC BLOOD PRESSURE: 80 MMHG

## 2022-11-01 DIAGNOSIS — J43.2 CENTRILOBULAR EMPHYSEMA (HCC): Chronic | ICD-10-CM

## 2022-11-01 DIAGNOSIS — I48.91 ATRIAL FIBRILLATION, UNSPECIFIED TYPE (HCC): ICD-10-CM

## 2022-11-01 DIAGNOSIS — Z00.00 MEDICARE ANNUAL WELLNESS VISIT, SUBSEQUENT: Primary | ICD-10-CM

## 2022-11-01 DIAGNOSIS — I10 BENIGN ESSENTIAL HYPERTENSION: ICD-10-CM

## 2022-11-01 DIAGNOSIS — Z23 ENCOUNTER FOR ADMINISTRATION OF VACCINE: ICD-10-CM

## 2022-11-01 DIAGNOSIS — N18.31 STAGE 3A CHRONIC KIDNEY DISEASE (HCC): ICD-10-CM

## 2022-11-01 DIAGNOSIS — Z78.0 MENOPAUSE: ICD-10-CM

## 2022-11-01 NOTE — PROGRESS NOTES
Assessment and Plan:     Problem List Items Addressed This Visit        Respiratory    Centrilobular emphysema ([facility] ) (Chronic)    Relevant Orders    CT chest w contrast       Cardiovascular and Mediastinum    Atrial fibrillation ([facility] )    Benign essential hypertension       Genitourinary    Stage 3a chronic kidney disease ([facility] )    Relevant Orders    Ambulatory Referral to Nephrology      Other Visit Diagnoses     Medicare annual wellness visit, subsequent    -  Primary    Encounter for administration of vaccine        Relevant Orders    influenza vaccine, high-dose, PF 0 7 mL (FLUZONE HIGH-DOSE) (Completed)    Menopause        Relevant Orders    DXA bone density spine hip and pelvis      Recommend CT scan; has not had 1 since 2019 shortness of breath noticeable today during her exam   Was have a history of emphysema seeing Pulmonary  AFib and hypertension currently stable seeing Cardiology  Chronic stage IIIA referred to Nephrology  DEXA scan ordered  BMI Counseling: Body mass index is 45 2 kg/m²  The BMI is above normal  Exercise recommendations include exercising 3-5 times per week  Rationale for BMI follow-up plan is due to patient being overweight or obese  Depression Screening and Follow-up Plan: Patient was screened for depression during today's encounter  They screened negative with a PHQ-2 score of 0  Preventive health issues were discussed with patient, and age appropriate screening tests were ordered as noted in patient's After Visit Summary  Personalized health advice and appropriate referrals for health education or preventive services given if needed, as noted in patient's After Visit Summary  History of Present Illness:     Patient presents for a Medicare Wellness Visit  States that she feels stable but has multiple medical conditions that she feels weak at times  States that she feels short of breath and is planning on contacting her pulmonologist   Seeing her cardiologist regularly  Taking all her medications as prescribed without any issues  Knows that she might be due for DEXA scan    HPI   Patient Care Team:  Sofya Smith MD as PCP - General (Family Medicine)  Alissa Avendano DO     Review of Systems:     Review of Systems   Constitutional: Positive for fatigue  Negative for activity change, appetite change, chills and fever  HENT: Negative for congestion  Respiratory: Positive for shortness of breath  Negative for cough and chest tightness  Cardiovascular: Negative for chest pain and leg swelling  Gastrointestinal: Negative for abdominal distention, abdominal pain, constipation, diarrhea, nausea and vomiting  All other systems reviewed and are negative         Problem List:     Patient Active Problem List   Diagnosis   • Sarcoidosis   • Centrilobular emphysema (HCC)   • Shortness of breath   • Obstructive sleep apnea   • Paroxysmal atrial fibrillation (HCC)   • Benign essential hypertension   • Eczema   • Hypoxia, sleep related   • Ischemic cardiomyopathy   • Left bundle branch hemiblock   • Lumbar disc disorder with myelopathy   • Lymphadenopathy, mediastinal   • Morbid obesity, unspecified obesity type ([address] )   • Pulmonary hypertension (HCC)   • Pulmonary nodule seen on imaging study   • Snoring   • Emphysema lung (HCC)   • Atrial fibrillation (HCC)   • Persistent atrial fibrillation (HCC)   • Postnasal drip   • Wheeze   • Sebaceous cyst   • Current use of long term anticoagulation   • Neck pain   • Encounter for surgical follow-up care   • Dermoid cyst of skin of nose   • Infection of nose   • Stage 3a chronic kidney disease ([address] )      Past Medical and Surgical History:     Past Medical History:   Diagnosis Date   • Coronary artery disease    • 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   • Hypertension    • Obesity    • Sarcoidosis    • SOB (shortness of breath)      Past Surgical History:   Procedure Laterality Date   • CARDIAC SURGERY  06/02/2017   • CATARACT EXTRACTION Left 2019   • CYST REMOVAL      Lipoma   • DILATION AND CURETTAGE OF UTERUS     • EYE SURGERY     • INSERT / Denette Satchel / REMOVE PACEMAKER  2017   • OVARIAN CYST REMOVAL        Family History:     Family History   Problem Relation Age of Onset   • Emphysema Father    • Cancer Sister         ovarian   • Heart attack Brother    • Heart disease Family       Social History:     Social History     Socioeconomic History   • Marital status:       Spouse name: None   • Number of children: None   • Years of education: None   • Highest education level: None   Occupational History   • None   Tobacco Use   • Smoking status: Former Smoker     Packs/day: 1 50     Years: 40 00     Pack years: 60 00     Quit date:      Years since quittin 8   • Smokeless tobacco: Never Used   Vaping Use   • Vaping Use: Never used   Substance and Sexual Activity   • Alcohol use: Yes     Comment: rare   • Drug use: No   • Sexual activity: None   Other Topics Concern   • None   Social History Narrative   • None     Social Determinants of Health     Financial Resource Strain: Not on file   Food Insecurity: Not on file   Transportation Needs: Not on file   Physical Activity: Not on file   Stress: Not on file   Social Connections: Not on file   Intimate Partner Violence: Not on file   Housing Stability: Not on file      Medications and Allergies:     Current Outpatient Medications   Medication Sig Dispense Refill   • apixaban (ELIQUIS) 5 mg Take 5 mg by mouth 2 (two) times a day 2022 stopped on 2022 for Procedure     • atorvastatin (LIPITOR) 10 mg tablet take 1 tablet by mouth once daily 90 tablet 2   • cholecalciferol (VITAMIN D3) 1,000 units tablet Take 1,000 Units by mouth daily     • Cyanocobalamin (Vitamin B 12) 100 MCG LOZG Take by mouth     • eplerenone (INSPRA) 50 MG tablet Take 50 mg by mouth daily     • furosemide (LASIX) 40 mg tablet Take 40 mg by mouth daily  0   • glucosamine-chondroitin 500-400 MG tablet Take 1 tablet by mouth daily      • levothyroxine 50 mcg tablet take 1 tablet by mouth once daily 90 tablet 2   • losartan (COZAAR) 25 mg tablet   0   • metoprolol succinate (TOPROL-XL) 50 mg 24 hr tablet take 1 tablet by mouth once daily 90 tablet 0   • Multiple Vitamins-Minerals (HAIR SKIN AND NAILS FORMULA PO) Take by mouth     • multivitamin (THERAGRAN) TABS Take 1 tablet by mouth daily     • sacubitril-valsartan (Entresto) 49-51 MG TABS Take 1 tablet by mouth 2 (two) times a day 90 tablet 1   • mupirocin (BACTROBAN) 2 % ointment Apply topically 3 (three) times a day (Patient not taking: No sig reported) 22 g 0     No current facility-administered medications for this visit  No Known Allergies   Immunizations:     Immunization History   Administered Date(s) Administered   • COVID-19 MODERNA VACC 0 5 ML IM 02/25/2021, 03/26/2021   • Influenza Split High Dose Preservative Free IM 10/01/2014   • Influenza, high dose seasonal 0 7 mL 12/13/2021, 11/02/2022   • Influenza, recombinant, quadrivalent,injectable, preservative free 10/09/2019   • Influenza, seasonal, injectable 09/10/2013   • Pneumococcal 10/09/2018   • Pneumococcal Conjugate 13-Valent 10/09/2017   • Pneumococcal Conjugate PCV 7 10/09/2018   • Pneumococcal Polysaccharide PPV23 10/01/2014   • TD (adult) Preservative Free 10/14/2019   • Tuberculin Skin Test 06/15/2017, 06/22/2017      Health Maintenance:         Topic Date Due   • Hepatitis C Screening  Never done   • Breast Cancer Screening: Mammogram  11/01/2023 (Originally 5/12/1985)     There are no preventive care reminders to display for this patient  Medicare Screening Tests and Risk Assessments: Laura Greenberg is here for her Subsequent Wellness visit  Health Risk Assessment:   Patient rates overall health as good  Patient feels that their physical health rating is same  Patient is satisfied with their life  Eyesight was rated as same  Hearing was rated as same   Patient feels that their emotional and mental health rating is same  Patients states they are never, rarely angry  Patient states they are never, rarely unusually tired/fatigued  Pain experienced in the last 7 days has been some  Patient's pain rating has been 2/10  Patient states that she has experienced no weight loss or gain in last 6 months  Depression Screening:   PHQ-2 Score: 0      Fall Risk Screening: In the past year, patient has experienced: no history of falling in past year      Urinary Incontinence Screening:   Patient has leaked urine accidently in the last six months  Home Safety:  Patient has trouble with stairs inside or outside of their home  Patient has working smoke alarms and has working carbon monoxide detector  Home safety hazards include: none  Nutrition:   Current diet is Regular  Medications:   Patient is currently taking over-the-counter supplements  OTC medications include: see medication list  Patient is able to manage medications  Activities of Daily Living (ADLs)/Instrumental Activities of Daily Living (IADLs):   Walk and transfer into and out of bed and chair?: Yes  Dress and groom yourself?: Yes    Bathe or shower yourself?: Yes    Feed yourself?  Yes  Do your laundry/housekeeping?: Yes  Manage your money, pay your bills and track your expenses?: Yes  Make your own meals?: Yes    Do your own shopping?: Yes    Previous Hospitalizations:   Any hospitalizations or ED visits within the last 12 months?: No      Advance Care Planning:   Living will: Yes    Advanced directive: Yes      Cognitive Screening:   Provider or family/friend/caregiver concerned regarding cognition?: No    PREVENTIVE SCREENINGS      Cardiovascular Screening:    General: Screening Not Indicated and History Lipid Disorder      Diabetes Screening:     General: Screening Current      Colorectal Cancer Screening:     General: Risks and Benefits Discussed      Breast Cancer Screening:     General: Patient Declines      Cervical Cancer Screening:    General: Screening Not Indicated      Osteoporosis Screening:    General: History Osteoporosis      Abdominal Aortic Aneurysm (AAA) Screening:        General: Risks and Benefits Discussed      Lung Cancer Screening:     General: Screening Current    Due for: Low Dose CT (LDCT)      Hepatitis C Screening:    General: Risks and Benefits Discussed    Screening, Brief Intervention, and Referral to Treatment (SBIRT)    Screening  Typical number of drinks in a day: 0  Typical number of drinks in a week: 0  Interpretation: Low risk drinking behavior  Single Item Drug Screening:  How often have you used an illegal drug (including marijuana) or a prescription medication for non-medical reasons in the past year? never    Single Item Drug Screen Score: 0  Interpretation: Negative screen for possible drug use disorder    No exam data present     Physical Exam:     /80 (BP Location: Left arm, Patient Position: Sitting, Cuff Size: Large)   Pulse 96   Temp 98 °F (36 7 °C)   Resp 16   Ht 4' 10 5" (1 486 m)   Wt 99 8 kg (220 lb)   SpO2 98%   BMI 45 20 kg/m²     Physical Exam  Vitals reviewed  Constitutional:       Appearance: Normal appearance  She is well-developed  HENT:      Head: Normocephalic and atraumatic  Right Ear: Tympanic membrane, ear canal and external ear normal  There is no impacted cerumen  Left Ear: Tympanic membrane, ear canal and external ear normal  There is no impacted cerumen  Nose: Nose normal       Mouth/Throat:      Mouth: Mucous membranes are moist       Pharynx: Oropharynx is clear  Eyes:      Conjunctiva/sclera: Conjunctivae normal       Pupils: Pupils are equal, round, and reactive to light  Cardiovascular:      Rate and Rhythm: Normal rate and regular rhythm  Heart sounds: Normal heart sounds  Pulmonary:      Effort: Pulmonary effort is normal       Breath sounds: Normal breath sounds  Abdominal:      General: Abdomen is flat   Bowel sounds are normal       Palpations: Abdomen is soft  Musculoskeletal:         General: Normal range of motion  Cervical back: Normal range of motion and neck supple  Skin:     General: Skin is warm  Capillary Refill: Capillary refill takes less than 2 seconds  Neurological:      General: No focal deficit present  Mental Status: She is alert and oriented to person, place, and time  Mental status is at baseline  Psychiatric:         Mood and Affect: Mood normal          Behavior: Behavior normal          Thought Content:  Thought content normal          Judgment: Judgment normal           Reena Maynard MD

## 2022-11-01 NOTE — PATIENT INSTRUCTIONS
Medicare Preventive Visit Patient Instructions  Thank you for completing your Welcome to Medicare Visit or Medicare Annual Wellness Visit today  Your next wellness visit will be due in one year (11/2/2023)  The screening/preventive services that you may require over the next 5-10 years are detailed below  Some tests may not apply to you based off risk factors and/or age  Screening tests ordered at today's visit but not completed yet may show as past due  Also, please note that scanned in results may not display below  Preventive Screenings:  Service Recommendations Previous Testing/Comments   Colorectal Cancer Screening  * Colonoscopy    * Fecal Occult Blood Test (FOBT)/Fecal Immunochemical Test (FIT)  * Fecal DNA/Cologuard Test  * Flexible Sigmoidoscopy Age: 39-70 years old   Colonoscopy: every 10 years (may be performed more frequently if at higher risk)  OR  FOBT/FIT: every 1 year  OR  Cologuard: every 3 years  OR  Sigmoidoscopy: every 5 years  Screening may be recommended earlier than age 39 if at higher risk for colorectal cancer  Also, an individualized decision between you and your healthcare provider will decide whether screening between the ages of 74-80 would be appropriate  Colonoscopy: Not on file  FOBT/FIT: Not on file  Cologuard: Not on file  Sigmoidoscopy: Not on file          Breast Cancer Screening Age: 36 years old  Frequency: every 1-2 years  Not required if history of left and right mastectomy Mammogram: Not on file        Cervical Cancer Screening Between the ages of 21-29, pap smear recommended once every 3 years  Between the ages of 33-67, can perform pap smear with HPV co-testing every 5 years     Recommendations may differ for women with a history of total hysterectomy, cervical cancer, or abnormal pap smears in past  Pap Smear: Not on file    Screening Not Indicated   Hepatitis C Screening Once for adults born between [facility]  More frequently in patients at high risk for Hepatitis C Hep C Antibody: Not on file        Diabetes Screening 1-2 times per year if you're at risk for diabetes or have pre-diabetes Fasting glucose: 94 mg/dL (10/18/2022)  A1C: No results in last 5 years (No results in last 5 years)  Screening Current   Cholesterol Screening Once every 5 years if you don't have a lipid disorder  May order more often based on risk factors  Lipid panel: 10/18/2022    Screening Not Indicated  History Lipid Disorder     Other Preventive Screenings Covered by Medicare:  1  Abdominal Aortic Aneurysm (AAA) Screening: covered once if your at risk  You're considered to be at risk if you have a family history of AAA  2  Lung Cancer Screening: covers low dose CT scan once per year if you meet all of the following conditions: (1) Age 50-69; (2) No signs or symptoms of lung cancer; (3) Current smoker or have quit smoking within the last 15 years; (4) You have a tobacco smoking history of at least 20 pack years (packs per day multiplied by number of years you smoked); (5) You get a written order from a healthcare provider  3  Glaucoma Screening: covered annually if you're considered high risk: (1) You have diabetes OR (2) Family history of glaucoma OR (3)  aged 48 and older OR (3)  American aged 72 and older  3  Osteoporosis Screening: covered every 2 years if you meet one of the following conditions: (1) You're estrogen deficient and at risk for osteoporosis based off medical history and other findings; (2) Have a vertebral abnormality; (3) On glucocorticoid therapy for more than 3 months; (4) Have primary hyperparathyroidism; (5) On osteoporosis medications and need to assess response to drug therapy  · Last bone density test (DXA Scan): Not on file  5  HIV Screening: covered annually if you're between the age of 12-76  Also covered annually if you are younger than 13 and older than 72 with risk factors for HIV infection   For pregnant patients, it is covered up to 3 times per pregnancy  Immunizations:  Immunization Recommendations   Influenza Vaccine Annual influenza vaccination during flu season is recommended for all persons aged >= 6 months who do not have contraindications   Pneumococcal Vaccine   * Pneumococcal conjugate vaccine = PCV13 (Prevnar 13), PCV15 (Vaxneuvance), PCV20 (Prevnar 20)  * Pneumococcal polysaccharide vaccine = PPSV23 (Pneumovax) Adults 25-60 years old: 1-3 doses may be recommended based on certain risk factors  Adults 72 years old: 1-2 doses may be recommended based off what pneumonia vaccine you previously received   Hepatitis B Vaccine 3 dose series if at intermediate or high risk (ex: diabetes, end stage renal disease, liver disease)   Tetanus (Td) Vaccine - COST NOT COVERED BY MEDICARE PART B Following completion of primary series, a booster dose should be given every 10 years to maintain immunity against tetanus  Td may also be given as tetanus wound prophylaxis  Tdap Vaccine - COST NOT COVERED BY MEDICARE PART B Recommended at least once for all adults  For pregnant patients, recommended with each pregnancy  Shingles Vaccine (Shingrix) - COST NOT COVERED BY MEDICARE PART B  2 shot series recommended in those aged 48 and above     Health Maintenance Due:      Topic Date Due   • Hepatitis C Screening  Never done   • Breast Cancer Screening: Mammogram  11/01/2023 (Originally 5/12/1985)     Immunizations Due:      Topic Date Due   • Influenza Vaccine (1) 09/01/2022     Advance Directives   What are advance directives? Advance directives are legal documents that state your wishes and plans for medical care  These plans are made ahead of time in case you lose your ability to make decisions for yourself  Advance directives can apply to any medical decision, such as the treatments you want, and if you want to donate organs  What are the types of advance directives?   There are many types of advance directives, and each state has rules about how to use them  You may choose a combination of any of the following:  · Living will: This is a written record of the treatment you want  You can also choose which treatments you do not want, which to limit, and which to stop at a certain time  This includes surgery, medicine, IV fluid, and tube feedings  · Durable power of  for healthcare [facility]): This is a written record that states who you want to make healthcare choices for you when you are unable to make them for yourself  This person, called a proxy, is usually a family member or a friend  You may choose more than 1 proxy  · Do not resuscitate (DNR) order:  A DNR order is used in case your heart stops beating or you stop breathing  It is a request not to have certain forms of treatment, such as CPR  A DNR order may be included in other types of advance directives  · Medical directive: This covers the care that you want if you are in a coma, near death, or unable to make decisions for yourself  You can list the treatments you want for each condition  Treatment may include pain medicine, surgery, blood transfusions, dialysis, IV or tube feedings, and a ventilator (breathing machine)  · Values history: This document has questions about your views, beliefs, and how you feel and think about life  This information can help others choose the care that you would choose  Why are advance directives important? An advance directive helps you control your care  Although spoken wishes may be used, it is better to have your wishes written down  Spoken wishes can be misunderstood, or not followed  Treatments may be given even if you do not want them  An advance directive may make it easier for your family to make difficult choices about your care  Urinary Incontinence   Urinary incontinence (UI)  is when you lose control of your bladder  UI develops because your bladder cannot store or empty urine properly   The 3 most common types of UI are stress incontinence, urge incontinence, or both  Medicines:   · May be given to help strengthen your bladder control  Report any side effects of medication to your healthcare provider  Do pelvic muscle exercises often:  Your pelvic muscles help you stop urinating  Squeeze these muscles tight for 5 seconds, then relax for 5 seconds  Gradually work up to squeezing for 10 seconds  Do 3 sets of 15 repetitions a day, or as directed  This will help strengthen your pelvic muscles and improve bladder control  Train your bladder:  Go to the bathroom at set times, such as every 2 hours, even if you do not feel the urge to go  You can also try to hold your urine when you feel the urge to go  For example, hold your urine for 5 minutes when you feel the urge to go  As that becomes easier, hold your urine for 10 minutes  Self-care:   · Keep a UI record  Write down how often you leak urine and how much you leak  Make a note of what you were doing when you leaked urine  · Drink liquids as directed  You may need to limit the amount of liquid you drink to help control your urine leakage  Do not drink any liquid right before you go to bed  Limit or do not have drinks that contain caffeine or alcohol  · Prevent constipation  Eat a variety of high-fiber foods  Good examples are high-fiber cereals, beans, vegetables, and whole-grain breads  Walking is the best way to trigger your intestines to have a bowel movement  · Exercise regularly and maintain a healthy weight  Weight loss and exercise will decrease pressure on your bladder and help you control your leakage  · Use a catheter as directed  to help empty your bladder  A catheter is a tiny, plastic tube that is put into your bladder to drain your urine  · Go to behavior therapy as directed  Behavior therapy may be used to help you learn to control your urge to urinate      Weight Management   Why it is important to manage your weight:  Being overweight increases your risk of health conditions such as heart disease, high blood pressure, type 2 diabetes, and certain types of cancer  It can also increase your risk for osteoarthritis, sleep apnea, and other respiratory problems  Aim for a slow, steady weight loss  Even a small amount of weight loss can lower your risk of health problems  How to lose weight safely:  A safe and healthy way to lose weight is to eat fewer calories and get regular exercise  You can lose up about 1 pound a week by decreasing the number of calories you eat by 500 calories each day  Healthy meal plan for weight management:  A healthy meal plan includes a variety of foods, contains fewer calories, and helps you stay healthy  A healthy meal plan includes the following:  · Eat whole-grain foods more often  A healthy meal plan should contain fiber  Fiber is the part of grains, fruits, and vegetables that is not broken down by your body  Whole-grain foods are healthy and provide extra fiber in your diet  Some examples of whole-grain foods are whole-wheat breads and pastas, oatmeal, brown rice, and bulgur  · Eat a variety of vegetables every day  Include dark, leafy greens such as spinach, kale, arslan greens, and mustard greens  Eat yellow and orange vegetables such as carrots, sweet potatoes, and winter squash  · Eat a variety of fruits every day  Choose fresh or canned fruit (canned in its own juice or light syrup) instead of juice  Fruit juice has very little or no fiber  · Eat low-fat dairy foods  Drink fat-free (skim) milk or 1% milk  Eat fat-free yogurt and low-fat cottage cheese  Try low-fat cheeses such as mozzarella and other reduced-fat cheeses  · Choose meat and other protein foods that are low in fat  Choose beans or other legumes such as split peas or lentils  Choose fish, skinless poultry (chicken or turkey), or lean cuts of red meat (beef or pork)  Before you cook meat or poultry, cut off any visible fat  · Use less fat and oil    Try baking foods instead of frying them  Add less fat, such as margarine, sour cream, regular salad dressing and mayonnaise to foods  Eat fewer high-fat foods  Some examples of high-fat foods include french fries, doughnuts, ice cream, and cakes  · Eat fewer sweets  Limit foods and drinks that are high in sugar  This includes candy, cookies, regular soda, and sweetened drinks  Exercise:  Exercise at least 30 minutes per day on most days of the week  Some examples of exercise include walking, biking, dancing, and swimming  You can also fit in more physical activity by taking the stairs instead of the elevator or parking farther away from stores  Ask your healthcare provider about the best exercise plan for you  © Copyright [employer] 2018 Information is for End User's use only and may not be sold, redistributed or otherwise used for commercial purposes   All illustrations and images included in CareNotes® are the copyrighted property of A D A M , Inc  or [address] [place] [address]

## 2022-11-02 ENCOUNTER — TELEPHONE (OUTPATIENT)
Dept: NEPHROLOGY | Facility: CLINIC | Age: 77
End: 2022-11-02

## 2022-11-02 NOTE — TELEPHONE ENCOUNTER
cNew Patient Intake Form   Patient Details   Marco Ponce     1945     [id number]     Insurance Information   Name of [facility]    Does the patient need an insurance referral? no   If patient has [facility], please ask if they will be using their [facility]omones  Appointment Information   Who is calling to schedule? If not patient, what is callers name? Patient   Referring Provider  Fallon Glynn   Referring Provider Number [phone]    Reason for Appt (Diagnosis) Stage 3a chronic kidney disease    Is patient aware of why they are being referred? yes   Does Patient have labs done at [place] [address]? If not, where do they go?  yes   Has patient had labs / urine work done? List date of most recent lab / urine work  no   Has patient had a BMP & CBC done in the past 2 years? If so, list the date Yes 10/18   Has patient been hospitalized recently? If yes, list name and location of hospital they were in  no   Has patient been seen by a Nephrologist before? If yes, list name, location and phone number no    Has patient been see by another Speciality before (ex  Neurology, urology, cardiology)? If yes, please list name, and speciality  Cardio- Ruthy Knott   Has the patient had imaging done? If so, list the most recent date and type of imaging No, maybe 11/9   Does the patient has a stone analysis report if history of kidney stones? no    Appointment Details   Is there a referral on file?  yes    Appointment Date  1/19   Location [facility]

## 2022-11-09 ENCOUNTER — HOSPITAL ENCOUNTER (OUTPATIENT)
Dept: RADIOLOGY | Facility: HOSPITAL | Age: 77
Discharge: HOME | End: 2022-11-09
Attending: FAMILY MEDICINE

## 2022-11-09 DIAGNOSIS — J43.2 CENTRILOBULAR EMPHYSEMA (HCC): Chronic | ICD-10-CM

## 2022-11-09 RX ADMIN — IOHEXOL 85 ML: 350 INJECTION, SOLUTION INTRAVENOUS at 11:41

## 2022-11-16 DIAGNOSIS — R91.8 ABNORMAL CT LUNG SCREENING: Primary | ICD-10-CM

## 2022-11-16 DIAGNOSIS — R10.11 RUQ ABDOMINAL PAIN: Primary | ICD-10-CM

## 2022-11-16 NOTE — PROGRESS NOTES
I spoke with [facility] regarding CT of chest that was done 11/9/22  She is agreeable to have repeat CT of chest in 6 months  There is a 1 2 cm nodular pleural based opacity in the right upper lobe  This was more prominent compared to previous studies  She also has mediastinal and hilar adenopathy unchanged since previous study

## 2022-11-23 ENCOUNTER — HOSPITAL ENCOUNTER (OUTPATIENT)
Dept: RADIOLOGY | Facility: HOSPITAL | Age: 77
Discharge: HOME | End: 2022-11-23
Attending: FAMILY MEDICINE

## 2022-11-23 DIAGNOSIS — R10.11 RUQ ABDOMINAL PAIN: ICD-10-CM

## 2022-11-28 ENCOUNTER — TELEPHONE (OUTPATIENT)
Dept: FAMILY MEDICINE CLINIC | Facility: CLINIC | Age: 77
End: 2022-11-28

## 2022-11-29 DIAGNOSIS — K86.9 LESION OF PANCREAS: Primary | ICD-10-CM

## 2022-11-29 NOTE — TELEPHONE ENCOUNTER
Dr Cooper Jurist:    Patient's cardiologist will not approve MRI until she has chest xray done prior  Once results from xray she will know if MRI is approved  Cardiologist placed order for chest xray  Please c/b to discuss further

## 2022-11-29 NOTE — TELEPHONE ENCOUNTER
Please send Kati-> MRI of the Abdomen  ONLY if patient says its ok  If not will switch order  Pancreatic lesion seen on US  Patient calling her cardiologist to see if she is allowed to get MRI

## 2022-12-07 ENCOUNTER — TELEPHONE (OUTPATIENT)
Dept: FAMILY MEDICINE CLINIC | Facility: CLINIC | Age: 77
End: 2022-12-07

## 2022-12-07 NOTE — TELEPHONE ENCOUNTER
Please call Dr [facility] to advise them to review her CXR> They wanted her to get one prior to MRI to see if it was oK given pacemaker for her to obtain MRI  She is still waiting for their clearance

## 2022-12-07 NOTE — TELEPHONE ENCOUNTER
Please call pt with cxr results from 12/1  She needed to get it done prior to having her mri done due to her pacemaker

## 2022-12-08 NOTE — TELEPHONE ENCOUNTER
I called and spoke with [facility] she is going to call and get the CXR faxed to them and will fax over the clearance after Dr Herlinda Bland reviews it

## 2023-01-05 ENCOUNTER — CONSULT (OUTPATIENT)
Dept: NEPHROLOGY | Facility: CLINIC | Age: 78
End: 2023-01-05

## 2023-01-05 VITALS
SYSTOLIC BLOOD PRESSURE: 118 MMHG | WEIGHT: 219 LBS | DIASTOLIC BLOOD PRESSURE: 68 MMHG | BODY MASS INDEX: 44.15 KG/M2 | HEIGHT: 59 IN

## 2023-01-05 DIAGNOSIS — D86.9 SARCOIDOSIS: Chronic | ICD-10-CM

## 2023-01-05 DIAGNOSIS — I27.20 PULMONARY HYPERTENSION (HCC): Primary | ICD-10-CM

## 2023-01-05 DIAGNOSIS — I10 BENIGN ESSENTIAL HYPERTENSION: ICD-10-CM

## 2023-01-05 DIAGNOSIS — I25.5 ISCHEMIC CARDIOMYOPATHY: ICD-10-CM

## 2023-01-05 DIAGNOSIS — J43.2 CENTRILOBULAR EMPHYSEMA (HCC): Chronic | ICD-10-CM

## 2023-01-05 DIAGNOSIS — N18.31 STAGE 3A CHRONIC KIDNEY DISEASE (HCC): ICD-10-CM

## 2023-01-05 DIAGNOSIS — I48.19 PERSISTENT ATRIAL FIBRILLATION (HCC): ICD-10-CM

## 2023-01-05 NOTE — PATIENT INSTRUCTIONS
1  )  Low 2 g sodium diet    2 )  Monitor weights at home    3 )  Avoid NSAIDs (ibuprofen, Motrin, Advil, Aleve, naproxen)    4 )  Monitor blood pressure at home, call if blood pressure greater than 150/90 persistently    5 ) I will plan to discuss all results including blood work, and/or imaging at our next visit, unless there is an urgent indication, in which case I will call you earlier  If you have any questions or concerns about your results, please feel free to call our office      6 ) Consider medications such as [place] or [place], or [place]

## 2023-01-05 NOTE — PROGRESS NOTES
NEPHROLOGY OUTPATIENT CONSULTATION   Stephen Obrien 69 y o  female MRN: [id number]  Date: 1/5/2023  Reason for consultation:   Chief Complaint   Patient presents with   • Consult       ASSESSMENT and PLAN:    Thank you for the courtesy of this consultation  I had the pleasure of seeing Gregoria Dooley today in the renal clinic for the initial management of chronic kidney disease  Chronic Kidney Disease Stage II/IIIA  --Imaging: Plan to get an MRI of the abdomen we will follow that up   --Urinalysis: Unable to provide a urine specimen  We will check a urine analysis at the next visit  --Proteinuria: We will follow-up with urine analysis  --Baseline Kidney Function: low 1's, 0 9-1 2 mg/dL (GFR 45-62 mL/min)  --Etiology: Presumed to be secondary to age-related nephron loss, hypertensive nephrosclerosis, cardiorenal syndrome, arteriolar sclerosis, idiopathic nausea glomerulosclerosis from chronic smoking  Cannot rule out sarcoidosis of the kidney though I think less likely  --Biopsy Proven: No  --Serologies: Clinically indicated  --Reducing Cardiovascular Risk Factors:  Simvastatin+ Ezetimibe reduced atherosclerotic events in CKD (SHARP trial); Low dose aspirin safe (if no contraindications exist); smoking is an independent risk factor for Chronic Kidney Disease and progression, strongly recommend smoking cessation  --Status: Stable  Creatinine 1 1 mg/dL  --Management: Continue Entresto, continue control of CHF and hypertension  No longer smoking  Avoid NSAIDs  Discussed SGLT2 better and [place]      Centrilobular emphysema  -- Following with pulmonary  -- Former smoker  -- Also has a history of sarcoidosis  -- Not on home oxygen    Sarcoidosis  -- Not on home oxygen  -- Following with pulmonary  -- Right cervical lymph node biopsy showed evidence of noncaseating granulomas consistent with sarcoidosis  -- Has stable mediastinal bilateral hilar adenopathy    Hypertension  -- Blood pressure controlled   -- Examines euvolemic  -- Target blood pressure less than 130/80  -- Entresto, metoprolol XL, furosemide, eplerenone    Hypothyroidism  -- Levothyroxine 50 mcg daily    Atrial fibrillation  -- Rate control with metoprolol and anticoagulation with Eliquis    Ischemic cardiomyopathy  -- Dual pacemaker/ICD  -- Continue Entresto  -- Continue eplerenone  -- Continue statin  -- Low 2 g sodium diet, monitor weight    Coronary artery disease status post CABG      PATIENT INSTRUCTIONS:    Patient Instructions   1 )  Low 2 g sodium diet    2 )  Monitor weights at home    3 )  Avoid NSAIDs (ibuprofen, Motrin, Advil, Aleve, naproxen)    4 )  Monitor blood pressure at home, call if blood pressure greater than 150/90 persistently    5 ) I will plan to discuss all results including blood work, and/or imaging at our next visit, unless there is an urgent indication, in which case I will call you earlier  If you have any questions or concerns about your results, please feel free to call our office  6 ) Consider medications such as Margaretann Ridgeway or Jardiance, or Kerendia        HISTORY OF PRESENT ILLNESS:  Requesting Physician: Josafat Trujillo MD    David Sapp is a 69 y o  female who has an extensive past medical history which includes emphysema, sarcoidosis, ischemic cardiomyopathy, hypertension, obesity who presents for an evaluation of mild chronic kidney disease  Reviewed her blood work from 2017 till the most recent of October 2022  Baseline creatinine fluctuates anywhere from 0 9 to 1 2 mg/dL  Overall her renal function has been quite stable with a GFR that fluctuates between 45 to 62 mL/min  No chest pain or shortness of breath  Her brother was on dialysis  Renal function overall has remained stable  Reports no NSAID use  Former smoker      PAST MEDICAL HISTORY:  Past Medical History:   Diagnosis Date   • Coronary artery disease    • 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   • Hypertension    • Obesity    • Sarcoidosis    • SOB (shortness of breath)        PAST SURGICAL HISTORY:  Past Surgical History:   Procedure Laterality Date   • CARDIAC SURGERY  2017   • CATARACT EXTRACTION Left 2019   • CYST REMOVAL      Lipoma   • DILATION AND CURETTAGE OF UTERUS     • EYE SURGERY     • Magalie Alvarez / Renee Mata / REMOVE PACEMAKER  2017   • OVARIAN CYST REMOVAL         ALLERGIES:  No Known Allergies    SOCIAL HISTORY:  Social History     Substance and Sexual Activity   Alcohol Use Yes    Comment: rare     Social History     Substance and Sexual Activity   Drug Use No     Social History     Tobacco Use   Smoking Status Former   • Packs/day: 1 50   • Years: 40 00   • Pack years: 60 00   • Types: Cigarettes   • Quit date: 26   • Years since quittin 0   Smokeless Tobacco Never       FAMILY HISTORY:  Family History   Problem Relation Age of Onset   • Emphysema Father    • Cancer Sister         ovarian   • Heart attack Brother    • Heart disease Family        MEDICATIONS:    Current Outpatient Medications:   •  apixaban (ELIQUIS) 5 mg, Take 5 mg by mouth 2 (two) times a day 2022 stopped on 2022 for Procedure, Disp: , Rfl:   •  atorvastatin (LIPITOR) 10 mg tablet, take 1 tablet by mouth once daily, Disp: 90 tablet, Rfl: 2  •  cholecalciferol (VITAMIN D3) 1,000 units tablet, Take 2,000 Units by mouth daily, Disp: , Rfl:   •  Cyanocobalamin (Vitamin B 12) 100 MCG LOZG, Take by mouth, Disp: , Rfl:   •  eplerenone (INSPRA) 50 MG tablet, Take 50 mg by mouth daily, Disp: , Rfl:   •  furosemide (LASIX) 40 mg tablet, Take 40 mg by mouth daily, Disp: , Rfl: 0  •  glucosamine-chondroitin 500-400 MG tablet, Take 1 tablet by mouth daily , Disp: , Rfl:   •  levothyroxine 50 mcg tablet, take 1 tablet by mouth once daily, Disp: 90 tablet, Rfl: 2  •  metoprolol succinate (TOPROL-XL) 50 mg 24 hr tablet, take 1 tablet by mouth once daily, Disp: 90 tablet, Rfl: 2  •  Multiple Vitamins-Minerals (HAIR SKIN AND NAILS FORMULA PO), Take by mouth, Disp: , Rfl:   •  multivitamin (THERAGRAN) TABS, Take 1 tablet by mouth daily, Disp: , Rfl:   •  sacubitril-valsartan (Entresto) 49-51 MG TABS, Take 1 tablet by mouth 2 (two) times a day, Disp: 90 tablet, Rfl: 1  •  losartan (COZAAR) 25 mg tablet, , Disp: , Rfl: 0  •  mupirocin (BACTROBAN) 2 % ointment, Apply topically 3 (three) times a day (Patient not taking: Reported on 4/29/2022), Disp: 22 g, Rfl: 0    REVIEW OF SYSTEMS:  Review of Systems   Constitutional: Negative for activity change and fever  Respiratory: Negative for cough, chest tightness, shortness of breath and wheezing  Cardiovascular: Negative for chest pain and leg swelling  Gastrointestinal: Negative for abdominal pain, diarrhea, nausea and vomiting  Endocrine: Negative for polyuria  Genitourinary: Negative for difficulty urinating, dysuria, flank pain, frequency and urgency  Skin: Negative for rash  Neurological: Negative for dizziness, syncope, light-headedness and headaches  All the systems were reviewed and were negative except as documented on the HPI  PHYSICAL EXAM:  Current Weight: Body mass index is 44 99 kg/m²  Vitals:    01/05/23 1312 01/05/23 1403   BP:  118/68   Weight: 99 3 kg (219 lb)    Height: 4' 10 5" (1 486 m)        Physical Exam  Vitals and nursing note reviewed  Exam conducted with a chaperone present  Constitutional:       General: She is not in acute distress  Appearance: She is well-developed  She is obese  HENT:      Head: Normocephalic and atraumatic  Eyes:      General: No scleral icterus  Conjunctiva/sclera: Conjunctivae normal       Pupils: Pupils are equal, round, and reactive to light  Cardiovascular:      Rate and Rhythm: Normal rate and regular rhythm  Heart sounds: S1 normal and S2 normal  No murmur heard  No friction rub  No gallop  Pulmonary:      Effort: Pulmonary effort is normal  No respiratory distress  Breath sounds: Normal breath sounds  No wheezing or rales  Abdominal:      General: Bowel sounds are normal       Palpations: Abdomen is soft  Tenderness: There is no abdominal tenderness  There is no rebound  Musculoskeletal:         General: Normal range of motion  Cervical back: Normal range of motion and neck supple  Skin:     Findings: No rash  Neurological:      Mental Status: She is alert and oriented to person, place, and time     Psychiatric:         Behavior: Behavior normal          Laboratory results:   Results for orders placed or performed in visit on 10/18/22   Comprehensive metabolic panel   Result Value Ref Range    Sodium 137 135 - 147 mmol/L    Potassium 4 1 3 5 - 5 3 mmol/L    Chloride 103 96 - 108 mmol/L    CO2 27 21 - 32 mmol/L    ANION GAP 7 4 - 13 mmol/L    BUN 23 5 - 25 mg/dL    Creatinine 1 10 0 60 - 1 30 mg/dL    Glucose, Fasting 94 65 - 99 mg/dL    Calcium 9 8 8 3 - 10 1 mg/dL    AST 19 5 - 45 U/L    ALT 32 12 - 78 U/L    Alkaline Phosphatase 67 46 - 116 U/L    Total Protein 7 6 6 4 - 8 4 g/dL    Albumin 3 5 3 5 - 5 0 g/dL    Total Bilirubin 0 72 0 20 - 1 00 mg/dL    eGFR 48 ml/min/1 73sq m   Lipid panel   Result Value Ref Range    Cholesterol 151 See Comment mg/dL    Triglycerides 142 See Comment mg/dL    HDL, Direct 45 (L) >=50 mg/dL    LDL Calculated 78 0 - 100 mg/dL    Non-HDL-Chol (CHOL-HDL) 106 mg/dl

## 2023-01-06 ENCOUNTER — TELEPHONE (OUTPATIENT)
Dept: NEPHROLOGY | Facility: CLINIC | Age: 78
End: 2023-01-06

## 2023-02-06 ENCOUNTER — HOSPITAL ENCOUNTER (OUTPATIENT)
Dept: RADIOLOGY | Facility: HOSPITAL | Age: 78
Discharge: HOME | End: 2023-02-06
Attending: FAMILY MEDICINE

## 2023-02-06 VITALS — HEIGHT: 58 IN | BODY MASS INDEX: 46.18 KG/M2 | WEIGHT: 220 LBS

## 2023-02-06 DIAGNOSIS — Z78.0 MENOPAUSE: ICD-10-CM

## 2023-03-21 ENCOUNTER — OFFICE VISIT (OUTPATIENT)
Dept: PULMONOLOGY | Facility: MEDICAL CENTER | Age: 78
End: 2023-03-21

## 2023-03-21 VITALS
BODY MASS INDEX: 45.21 KG/M2 | WEIGHT: 215.4 LBS | SYSTOLIC BLOOD PRESSURE: 110 MMHG | OXYGEN SATURATION: 92 % | DIASTOLIC BLOOD PRESSURE: 76 MMHG | TEMPERATURE: 98 F | HEIGHT: 58 IN | HEART RATE: 75 BPM

## 2023-03-21 DIAGNOSIS — E66.01 MORBID OBESITY, UNSPECIFIED OBESITY TYPE (HCC): ICD-10-CM

## 2023-03-21 DIAGNOSIS — I27.20 PULMONARY HYPERTENSION (HCC): ICD-10-CM

## 2023-03-21 DIAGNOSIS — D86.9 SARCOIDOSIS: Chronic | ICD-10-CM

## 2023-03-21 DIAGNOSIS — G47.33 OBSTRUCTIVE SLEEP APNEA: ICD-10-CM

## 2023-03-21 DIAGNOSIS — I25.5 ISCHEMIC CARDIOMYOPATHY: ICD-10-CM

## 2023-03-21 DIAGNOSIS — R06.02 SHORTNESS OF BREATH: Chronic | ICD-10-CM

## 2023-03-21 DIAGNOSIS — J43.2 CENTRILOBULAR EMPHYSEMA (HCC): Primary | Chronic | ICD-10-CM

## 2023-03-21 PROBLEM — R93.89 ABNORMAL CT OF THE CHEST: Status: ACTIVE | Noted: 2023-03-21

## 2023-03-21 RX ORDER — SPIRONOLACTONE 25 MG/1
25 TABLET ORAL DAILY
COMMUNITY
Start: 2023-03-10

## 2023-03-21 RX ORDER — TORSEMIDE 20 MG/1
40 TABLET ORAL DAILY
COMMUNITY
Start: 2023-03-16

## 2023-03-21 NOTE — ASSESSMENT & PLAN NOTE
Patient states that she has been prescribed CPAP in the past however she has been unable to tolerate the machine  She is not interested in a sleep study or trying a CPAP at this time  Advised that the CPAP machine could potentially help her moderate to severe pulmonary hypertension

## 2023-03-21 NOTE — ASSESSMENT & PLAN NOTE
Status post implanted defibrillator  Patient follows with cardiology  At today's visit, patient would like to know if she would feel her defibrillator go off and what she should do  Advised her that she would most likely feel it and would need to present to the emergency department for evaluation  She verbalized understanding

## 2023-03-21 NOTE — ASSESSMENT & PLAN NOTE
This has been chronic and ongoing since prior to her quadruple bypass  Patient relates her shortness of breath to her heart  On her most recent echo completed on 3/3/2023, patient's aortic valve is severely stenosed with mild aortic insufficiency  She also has torrential tricuspid insufficiency as well as RV systolic pressures between 50-60, suggesting moderate to severe pulmonary hypertension  Her EF appears to be 55% with mild LVH  A 6 minute walk was performed in the office today  Patient's oxygen on RA was 91% with a heart rate of 76  Patient completed 5 minutes of walking with an O2 saturation between 90-92%, and a heart rate between   At 6 minutes, patient's oxygen dropped to 87% with a heart rate of 103  Patient completed 4 minutes of exercise with 2L of oxygen and maintained saturations between 92-96% and a heart rate between   Patient was unable to complete the last 2 minutes  She walked a total of 100 meters  2L of oxygen with exertion was indicated by the walk, however, patient is declining oxygen at this time  Patient state's that she ambulates 80 laps around her cough daily, and when she feels short of breath, she stops  She used to check her oxygen saturations but her battery   I encouraged her to replace the battery and and check her saturations if she feels short of breath  I offered to do spirometry in the office today however patient declined  She is also declining outpatient PFTs  Advised patient to call us if she would like us to prescribe the oxygen for her

## 2023-03-21 NOTE — ASSESSMENT & PLAN NOTE
Echocardiogram completed on 3/8/2023 shows an RVSP between 50 and 60, suggesting moderate to severe pulmonary hypertension  Patient's cardiologist referred her to a pulmonary hypertension specialist, her appointment is on May 31st, 2023  I advised patient to keep this appointment

## 2023-03-21 NOTE — PROGRESS NOTES
Pulmonary Follow Up Note   Jo Knee 69 y o  female MRN: [id number]  3/21/2023      Assessment/Plan:     Centrilobular emphysema ([address] )  Patient has remained stable without the use of any inhalers or nebulizers  States that she has tried inhalers in the past however was unable to tolerate them  She declines a rescue inhaler at this time  Shortness of breath  This has been chronic and ongoing since prior to her quadruple bypass  Patient relates her shortness of breath to her heart  On her most recent echo completed on 3/3/2023, patient's aortic valve is severely stenosed with mild aortic insufficiency  She also has torrential tricuspid insufficiency as well as RV systolic pressures between 50-60, suggesting moderate to severe pulmonary hypertension  Her EF appears to be 55% with mild LVH  A 6 minute walk was performed in the office today  Patient's oxygen on RA was 91% with a heart rate of 76  Patient completed 5 minutes of walking with an O2 saturation between 90-92%, and a heart rate between   At 6 minutes, patient's oxygen dropped to 87% with a heart rate of 103  Patient completed 4 minutes of exercise with 2L of oxygen and maintained saturations between 92-96% and a heart rate between   Patient was unable to complete the last 2 minutes  She walked a total of 100 meters  2L of oxygen with exertion was indicated by the walk, however, patient is declining oxygen at this time  Patient state's that she ambulates 80 laps around her cough daily, and when she feels short of breath, she stops  She used to check her oxygen saturations but her battery   I encouraged her to replace the battery and and check her saturations if she feels short of breath  I offered to do spirometry in the office today however patient declined  She is also declining outpatient PFTs  Advised patient to call us if she would like us to prescribe the oxygen for her       Obstructive sleep apnea  Patient states that she has been prescribed CPAP in the past however she has been unable to tolerate the machine  She is not interested in a sleep study or trying a CPAP at this time  Advised that the CPAP machine could potentially help her moderate to severe pulmonary hypertension  Sarcoidosis  This has been stable  CT of the chest was completed in November 2022 which showed marked mediastinal and bilateral hilar adenopathy that has been unchanged since 2019 and is compatible with the patient's history of sarcoidosis  She is currently not on any treatment  Ischemic cardiomyopathy  Status post implanted defibrillator  Patient follows with cardiology  At today's visit, patient would like to know if she would feel her defibrillator go off and what she should do  Advised her that she would most likely feel it and would need to present to the emergency department for evaluation  She verbalized understanding  Pulmonary hypertension (HCC)  Echocardiogram completed on 3/8/2023 shows an RVSP between 50 and 60, suggesting moderate to severe pulmonary hypertension  Patient's cardiologist referred her to a pulmonary hypertension specialist, her appointment is on May 31st, 2023  I advised patient to keep this appointment  Abnormal CT of the chest  CT of chest on 11/9/2022 showed a 1 2 cm nodular pleural-based focal opacity in the peripheral right upper lobe which is more prominent since prior study  CT suggest probably focal scarring however, recommend CT follow-up in 6 months  This has been ordered for patient however she declines further imaging at this time  Visit orders:    Problem List Items Addressed This Visit        Respiratory    Centrilobular emphysema ([address] ) - Primary (Chronic)     Patient has remained stable without the use of any inhalers or nebulizers  States that she has tried inhalers in the past however was unable to tolerate them  She declines a rescue inhaler at this time           Obstructive sleep apnea     Patient states that she has been prescribed CPAP in the past however she has been unable to tolerate the machine  She is not interested in a sleep study or trying a CPAP at this time  Advised that the CPAP machine could potentially help her moderate to severe pulmonary hypertension  Cardiovascular and Mediastinum    Ischemic cardiomyopathy     Status post implanted defibrillator  Patient follows with cardiology  At today's visit, patient would like to know if she would feel her defibrillator go off and what she should do  Advised her that she would most likely feel it and would need to present to the emergency department for evaluation  She verbalized understanding  Pulmonary hypertension (HCC)     Echocardiogram completed on 3/8/2023 shows an RVSP between 50 and 60, suggesting moderate to severe pulmonary hypertension  Patient's cardiologist referred her to a pulmonary hypertension specialist, her appointment is on May 31st, 2023  I advised patient to keep this appointment  Other    Sarcoidosis (Chronic)     This has been stable  CT of the chest was completed in November 2022 which showed marked mediastinal and bilateral hilar adenopathy that has been unchanged since 2019 and is compatible with the patient's history of sarcoidosis  She is currently not on any treatment  Shortness of breath (Chronic)     This has been chronic and ongoing since prior to her quadruple bypass  Patient relates her shortness of breath to her heart  On her most recent echo completed on 3/3/2023, patient's aortic valve is severely stenosed with mild aortic insufficiency  She also has torrential tricuspid insufficiency as well as RV systolic pressures between 50-60, suggesting moderate to severe pulmonary hypertension  Her EF appears to be 55% with mild LVH  A 6 minute walk was performed in the office today  Patient's oxygen on RA was 91% with a heart rate of 76   Patient completed 5 minutes of walking with an O2 saturation between 90-92%, and a heart rate between   At 6 minutes, patient's oxygen dropped to 87% with a heart rate of 103  Patient completed 4 minutes of exercise with 2L of oxygen and maintained saturations between 92-96% and a heart rate between   Patient was unable to complete the last 2 minutes  She walked a total of 100 meters  2L of oxygen with exertion was indicated by the walk, however, patient is declining oxygen at this time  Patient state's that she ambulates 80 laps around her cough daily, and when she feels short of breath, she stops  She used to check her oxygen saturations but her battery   I encouraged her to replace the battery and and check her saturations if she feels short of breath  I offered to do spirometry in the office today however patient declined  She is also declining outpatient PFTs  Advised patient to call us if she would like us to prescribe the oxygen for her  Relevant Orders    POCT 6 minute walk (Completed)    Morbid obesity, unspecified obesity type ([address] )       Return if symptoms worsen or fail to improve  History of Present Illness   HPI:  Marv Mcgarry is a 69 y o  female who presents for yearly follow-up regarding emphysema and shortness of breath  Patient states that her shortness of breath with exertion has been increasing tremendously over the past year  She relates that this is all due to her "heart problems " She also complains of an increased nonproductive cough  She has tried inhalers in the past and they do not work for her  She states that she has also used oxygen in the past and she did not do well with it  She has trialed CPAP in the past and was unable to tolerate it  Her cardiologist has referred her to a pulmonary hypertension specialist as her most recent echocardiogram showed moderate to severe pulmonary hypertension  She has an appointment with the specialist on 23  She had a CT of chest with IV contrast ordered and completed in November 2022 which showed mild to moderate emphysema with mild interlobular septal thickening  There is also a 1 2 cm nodular pleural-base to focal opacity in the right upper lobe which was more prominent compared to prior studies  There is also a small focal area of groundglass opacity in the inferior lower lobe that was most likely infectious or inflammatory  This CT scan also showed marked mediastinal and bilateral hilar adenopathy which has been unchanged since 2019, compatible with the patient's history of sarcoidosis  It was recommended at that time that she have a 6-month follow-up to ensure stability  Throughout our conversation, patient expressed that she would like to be a DO NOT RESUSCITATE, whether it she be inpatient or outpatient  She states that when the "good Broderick Lavern is ready to take me, I would like to go peacefully," as she watched her brother and sister suffer prior to their death's  I recommended she speak with her PCP regarding an out of hospital DNR order  Review of Systems   Constitutional: Positive for activity change and fatigue  Negative for chills and fever  HENT: Negative for congestion, rhinorrhea and trouble swallowing  Respiratory: Positive for cough, shortness of breath and wheezing  Negative for choking and chest tightness  Cardiovascular: Negative for chest pain, palpitations and leg swelling  Gastrointestinal: Negative for abdominal distention  Psychiatric/Behavioral: Negative for suicidal ideas  All other systems reviewed and are negative         Medical, Family and Social history reviewed and updated as appropriate    Historical Information   Past Medical History:   Diagnosis Date   • Coronary artery disease    • 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   • Hypertension    • Obesity    • Sarcoidosis    • SOB (shortness of breath)      Past Surgical History:   Procedure Laterality Date   • CARDIAC SURGERY  2017   • CATARACT EXTRACTION Left 2019   • CYST REMOVAL      Lipoma   • DILATION AND CURETTAGE OF UTERUS     • EYE SURGERY     • Rui Herd / Buzz Fortis / REMOVE PACEMAKER  2017   • OVARIAN CYST REMOVAL       Family History   Problem Relation Age of Onset   • Emphysema Father    • Cancer Sister         ovarian   • Heart attack Brother    • Heart disease Family        Social History     Tobacco Use   Smoking Status Former   • Packs/day: 1 50   • Years: 31 00   • Pack years: 46 50   • Types: Cigarettes   • Start date:    • Quit date:    • Years since quittin 2   Smokeless Tobacco Never         Meds/Allergies     Current Outpatient Medications:   •  apixaban (ELIQUIS) 5 mg, Take 5 mg by mouth 2 (two) times a day 2022 stopped on 2022 for Procedure, Disp: , Rfl:   •  atorvastatin (LIPITOR) 10 mg tablet, take 1 tablet by mouth once daily, Disp: 90 tablet, Rfl: 2  •  cholecalciferol (VITAMIN D3) 1,000 units tablet, Take 2,000 Units by mouth daily, Disp: , Rfl:   •  Cyanocobalamin (Vitamin B 12) 100 MCG LOZG, Take by mouth, Disp: , Rfl:   •  eplerenone (INSPRA) 50 MG tablet, Take 50 mg by mouth daily, Disp: , Rfl:   •  furosemide (LASIX) 40 mg tablet, Take 40 mg by mouth daily, Disp: , Rfl: 0  •  glucosamine-chondroitin 500-400 MG tablet, Take 1 tablet by mouth daily , Disp: , Rfl:   •  levothyroxine 50 mcg tablet, take 1 tablet by mouth once daily, Disp: 90 tablet, Rfl: 2  •  metoprolol succinate (TOPROL-XL) 50 mg 24 hr tablet, take 1 tablet by mouth once daily, Disp: 90 tablet, Rfl: 2  •  Multiple Vitamins-Minerals (HAIR SKIN AND NAILS FORMULA PO), Take by mouth, Disp: , Rfl:   •  multivitamin (THERAGRAN) TABS, Take 1 tablet by mouth daily, Disp: , Rfl:   •  sacubitril-valsartan (Entresto) 49-51 MG TABS, Take 1 tablet by mouth 2 (two) times a day, Disp: 90 tablet, Rfl: 1  •  spironolactone (ALDACTONE) 25 mg tablet, Take 25 mg by mouth daily, Disp: , Rfl:   •  torsemide (DEMADEX) 20 mg tablet, Take 40 mg by mouth daily, Disp: , Rfl:   •  losartan (COZAAR) 25 mg tablet, , Disp: , Rfl: 0  •  mupirocin (BACTROBAN) 2 % ointment, Apply topically 3 (three) times a day (Patient not taking: Reported on 4/29/2022), Disp: 22 g, Rfl: 0  No Known Allergies    Vitals: Blood pressure 110/76, pulse 75, temperature 98 °F (36 7 °C), height 4' 10" (1 473 m), weight 97 7 kg (215 lb 6 4 oz), SpO2 92 %  Body mass index is 45 02 kg/m²  Oxygen Therapy  SpO2: 92 %      Physical Exam  Vitals reviewed  Constitutional:       General: She is not in acute distress  Appearance: She is obese  She is not ill-appearing or toxic-appearing  HENT:      Head: Normocephalic and atraumatic  Nose: Nose normal       Mouth/Throat:      Pharynx: Oropharynx is clear  Eyes:      Pupils: Pupils are equal, round, and reactive to light  Cardiovascular:      Rate and Rhythm: Normal rate  Rhythm irregular  Heart sounds: Murmur heard  Pulmonary:      Effort: Pulmonary effort is normal  No tachypnea, bradypnea, accessory muscle usage, respiratory distress or retractions  Breath sounds: Normal air entry  No stridor or decreased air movement  No decreased breath sounds  Abdominal:      Palpations: Abdomen is soft  Musculoskeletal:         General: Normal range of motion  Cervical back: Normal range of motion  Left lower leg: Edema (trace, ankle) present  Skin:     General: Skin is warm and dry  Neurological:      General: No focal deficit present  Mental Status: She is alert and oriented to person, place, and time  Psychiatric:         Mood and Affect: Mood normal          Behavior: Behavior normal          Labs: I have personally reviewed pertinent lab results    Lab Results   Component Value Date    WBC 6 79 12/16/2021    HGB 13 6 12/16/2021    HCT 41 6 12/16/2021     (H) 12/16/2021     12/16/2021     Lab Results   Component Value Date    CALCIUM 9 8 10/18/2022    K 4 1 10/18/2022    CO2 27 10/18/2022     10/18/2022    BUN 23 10/18/2022    CREATININE 1 10 10/18/2022     No results found for: IGE  Lab Results   Component Value Date    ALT 32 10/18/2022    AST 19 10/18/2022    ALKPHOS 67 10/18/2022       Imaging and other studies: I have personally reviewed pertinent reports  and I have personally reviewed pertinent films in PACS     11/9/22 CT chest w/ contrast: Mild interstitial pulmonary edema  Mild to moderate cardiomegaly  1 2 cm nodular pleural-based focal opacity in the peripheral right upper lobe, more prominent since the prior study  This is probably focal scarring  Recommend CT follow-up in 6 months  Marked mediastinal and bilateral hilar adenopathy, grossly unchanged compared to the prior study from January 2019 and compatible with the patient's history of sarcoidosis  Small hiatal hernia  Cholelithiasis  Pulmonary function testing:  Performed 1/15/2019 and personally interpreted  FEV1/FVC ratio 74%   FEV1 90% predicted  FVC 94% predicted  No significant response to bronchodilators  TLC 96 % predicted  RV 91 % predicted  DLCO corrected for hemoglobin 48 % predicted  Normal lung volumes  No significant change after bronchodilator  Diffusion capacity is moderately decreased  Possibility of pulmonary vascular disease as a cause of decreased diffusion capacity  Other Studies: I have personally reviewed pertinent reports  3/8/23 Echo: Aortic valve is severely stenosed with mild aortic insufficiency  She also has torrential tricuspid insufficiency as well as RV systolic pressures between 50-60, suggesting moderate to severe pulmonary hypertension  Her EF appears to be 55% with mild LVH

## 2023-03-21 NOTE — ASSESSMENT & PLAN NOTE
CT of chest on 11/9/2022 showed a 1 2 cm nodular pleural-based focal opacity in the peripheral right upper lobe which is more prominent since prior study  CT suggest probably focal scarring however, recommend CT follow-up in 6 months  This has been ordered for patient however she declines further imaging at this time

## 2023-03-21 NOTE — ASSESSMENT & PLAN NOTE
Patient has remained stable without the use of any inhalers or nebulizers  States that she has tried inhalers in the past however was unable to tolerate them  She declines a rescue inhaler at this time

## 2023-03-31 ENCOUNTER — TELEPHONE (OUTPATIENT)
Dept: PULMONOLOGY | Facility: CLINIC | Age: 78
End: 2023-03-31

## 2023-03-31 DIAGNOSIS — J43.2 CENTRILOBULAR EMPHYSEMA (HCC): Primary | Chronic | ICD-10-CM

## 2023-03-31 NOTE — TELEPHONE ENCOUNTER
Patient is calling, she has changed her mind decided to go ahead with getting the oxygen ordered  She is asking for a return call to discuss this a bit further, she isn't sure if there is anything else that needs to be done to have this done  She would like to discuss oxygen tank options as well   Please advise

## 2023-04-24 ENCOUNTER — RA CDI HCC (OUTPATIENT)
Dept: OTHER | Facility: HOSPITAL | Age: 78
End: 2023-04-24

## 2023-04-24 NOTE — PROGRESS NOTES
[facility]  coding opportunities          Chart Reviewed number of suggestions sent to Provider: 1  [phone]     Patients Insurance     Medicare Insurance: [facility] Advantage

## 2023-05-01 ENCOUNTER — OFFICE VISIT (OUTPATIENT)
Dept: FAMILY MEDICINE CLINIC | Facility: CLINIC | Age: 78
End: 2023-05-01

## 2023-05-01 VITALS
OXYGEN SATURATION: 95 % | HEIGHT: 58 IN | BODY MASS INDEX: 44.5 KG/M2 | TEMPERATURE: 97.6 F | WEIGHT: 212 LBS | SYSTOLIC BLOOD PRESSURE: 110 MMHG | HEART RATE: 83 BPM | RESPIRATION RATE: 16 BRPM | DIASTOLIC BLOOD PRESSURE: 70 MMHG

## 2023-05-01 DIAGNOSIS — I48.19 PERSISTENT ATRIAL FIBRILLATION (HCC): ICD-10-CM

## 2023-05-01 DIAGNOSIS — E03.9 HYPOTHYROIDISM, UNSPECIFIED TYPE: ICD-10-CM

## 2023-05-01 DIAGNOSIS — I27.20 PULMONARY HYPERTENSION (HCC): ICD-10-CM

## 2023-05-01 DIAGNOSIS — D86.9 SARCOIDOSIS: Chronic | ICD-10-CM

## 2023-05-01 DIAGNOSIS — I10 BENIGN ESSENTIAL HYPERTENSION: Primary | ICD-10-CM

## 2023-05-01 DIAGNOSIS — K76.9 LIVER LESION: ICD-10-CM

## 2023-05-01 NOTE — PROGRESS NOTES
Nneka Joel 1945 female MRN: [id number]    [facility] OFFICE VISIT  [facility] - [address]      ASSESSMENT/PLAN  Nneka Joel is a 69 y o  female presents to the office for    Diagnoses and all orders for this visit:    Benign essential hypertension  -     Comprehensive metabolic panel; Future  -     CBC and differential; Future  -     Lipid panel; Future    Pulmonary hypertension (HCC)  -     Lipid panel; Future    Persistent atrial fibrillation (HCC)  -     Lipid panel; Future    Sarcoidosis    Liver lesion    Hypothyroidism, unspecified type  -     TSH, 3rd generation with Free T4 reflex; Future    Other orders  -     Discontinue: Cyanocobalamin (VITAMIN B-12 PO); Take 5,000 mcg by mouth (Patient not taking: Reported on 5/1/2023)  -     Cholecalciferol ([employer] VITAMIN D3 PO); Take by mouth (Patient not taking: Reported on 5/1/2023)     Dr Deysi Alva [facility] at [facility]; to see what MRI they are trying to do? Pending call back  And if she can go for my MRI also   Dr Alicia Stokes? ?? Is she able to take this  Patient worried about it  Liver lesion, is the reason why we are pending MRI  AFRIB currently controlled   Sarcoid pending MRI to see if there is evaluation with cardiac  BMI Counseling: Body mass index is 44 31 kg/m²  The BMI is above normal  Exercise recommendations include exercising 3-5 times per week  Rationale for BMI follow-up plan is due to patient being overweight or obese  Depression Screening and Follow-up Plan: Patient was screened for depression during today's encounter  They screened negative with a PHQ-2 score of 0            Future Appointments   Date Time Provider [place]   5/16/2023  1:00 PM Michaela Mancuso   7/12/2023  3:30 PM Musa Ortiz MD [facility]   11/2/2023  1:00 PM Jorge A Olsen MD [facility]          SUBJECTIVE  CC: Follow-up      HPI:  Nneka Joel is a 69 y o  female who presents for an follow-up appointment  Patient states she is very confused of what she is doing with the specialist   She states that apparently she is due for an MRI she does not understand given that they have not given her a call back of when she is doing this  Patient is currently asymptomatic of any symptoms  Patient states that they believe it secondary to her sarcoidosis  Patient is seeing her nephrologist but will not start Corbin Claudio that was suggested until she discusses it with him  \    Review of Systems   Constitutional: Positive for fatigue  Negative for activity change, appetite change, chills and fever  HENT: Negative for congestion  Respiratory: Negative for cough, chest tightness and shortness of breath  Cardiovascular: Negative for chest pain and leg swelling  Gastrointestinal: Negative for abdominal distention, abdominal pain, constipation, diarrhea, nausea and vomiting  Musculoskeletal: Positive for arthralgias  All other systems reviewed and are negative        Historical Information   The patient history was reviewed as follows:  Past Medical History:   Diagnosis Date   • Coronary artery disease    • 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HCC)     bullous   • Hypertension    • Obesity    • Sarcoidosis    • SOB (shortness of breath)          Medications:     Current Outpatient Medications:   •  apixaban (ELIQUIS) 5 mg, Take 5 mg by mouth 2 (two) times a day 6/2/2022 stopped on 5/31/2022 for Procedure, Disp: , Rfl:   •  atorvastatin (LIPITOR) 10 mg tablet, take 1 tablet by mouth once daily, Disp: 90 tablet, Rfl: 2  •  cholecalciferol (VITAMIN D3) 1,000 units tablet, Take 2,000 Units by mouth daily, Disp: , Rfl:   •  Cyanocobalamin (Vitamin B 12) 100 MCG LOZG, Take by mouth, Disp: , Rfl:   •  glucosamine-chondroitin 500-400 MG tablet, Take 1 tablet by mouth daily , Disp: , Rfl:   •  levothyroxine 50 mcg tablet, take 1 tablet by mouth once daily, Disp: 90 tablet, Rfl: 2  •  metoprolol succinate (TOPROL-XL) 50 mg 24 hr tablet, "take 1 tablet by mouth once daily, Disp: 90 tablet, Rfl: 2  •  Multiple Vitamins-Minerals (HAIR SKIN AND NAILS FORMULA PO), Take by mouth, Disp: , Rfl:   •  mupirocin (BACTROBAN) 2 % ointment, Apply topically 3 (three) times a day, Disp: 22 g, Rfl: 0  •  spironolactone (ALDACTONE) 25 mg tablet, Take 25 mg by mouth daily, Disp: , Rfl:   •  torsemide (DEMADEX) 20 mg tablet, Take 40 mg by mouth daily, Disp: , Rfl:   •  Cholecalciferol ([employer]S VITAMIN D3 PO), Take by mouth (Patient not taking: Reported on 5/1/2023), Disp: , Rfl:     No Known Allergies    OBJECTIVE  Vitals:   Vitals:    05/01/23 1340   BP: 110/70   BP Location: Left arm   Patient Position: Sitting   Cuff Size: Large   Pulse: 83   Resp: 16   Temp: 97 6 °F (36 4 °C)   SpO2: 95%   Weight: 96 2 kg (212 lb)   Height: 4' 10\" (1 473 m)         Physical Exam  Vitals reviewed  Constitutional:       Appearance: She is well-developed  HENT:      Head: Normocephalic and atraumatic  Eyes:      Conjunctiva/sclera: Conjunctivae normal       Pupils: Pupils are equal, round, and reactive to light  Cardiovascular:      Rate and Rhythm: Normal rate and regular rhythm  Heart sounds: Normal heart sounds  Pulmonary:      Effort: Pulmonary effort is normal  No respiratory distress  Breath sounds: Normal breath sounds  Musculoskeletal:         General: Normal range of motion  Cervical back: Normal range of motion and neck supple  Skin:     General: Skin is warm  Capillary Refill: Capillary refill takes less than 2 seconds  Neurological:      Mental Status: She is alert and oriented to person, place, and time                      Dipesh Manzanares MD,   [facility]  5/7/2023      "

## 2023-05-16 ENCOUNTER — HOSPITAL ENCOUNTER (OUTPATIENT)
Dept: RADIOLOGY | Facility: HOSPITAL | Age: 78
Discharge: HOME | End: 2023-05-16

## 2023-05-16 DIAGNOSIS — R91.8 ABNORMAL CT LUNG SCREENING: ICD-10-CM

## 2023-05-24 DIAGNOSIS — R93.89 ABNORMAL CT OF THE CHEST: Primary | ICD-10-CM

## 2023-05-24 NOTE — PROGRESS NOTES
I spoke to patient regarding results of CT scan  Done on 5/16/2023, the CT reveals increase in RUL of nodule measuring 1 5 x 1 0 nodule  This is suspicious for bronchogenic malignancy  Patient is agreeable to PET/CT but would like this done at [facility]   I will have the CT of chest sent to her via mail (CD ROM)>

## 2023-05-25 ENCOUNTER — APPOINTMENT (OUTPATIENT)
Dept: LAB | Facility: CLINIC | Age: 78
End: 2023-05-25

## 2023-05-25 DIAGNOSIS — I48.19 PERSISTENT ATRIAL FIBRILLATION (HCC): ICD-10-CM

## 2023-05-25 DIAGNOSIS — I10 BENIGN ESSENTIAL HYPERTENSION: ICD-10-CM

## 2023-05-25 DIAGNOSIS — E03.9 HYPOTHYROIDISM, UNSPECIFIED TYPE: ICD-10-CM

## 2023-05-25 DIAGNOSIS — I27.20 PULMONARY HYPERTENSION (HCC): ICD-10-CM

## 2023-05-25 LAB
ALBUMIN SERPL BCP-MCNC: 3.5 G/DL (ref 3.5–5)
ALP SERPL-CCNC: 59 U/L (ref 46–116)
ALT SERPL W P-5'-P-CCNC: 28 U/L (ref 12–78)
ANION GAP SERPL CALCULATED.3IONS-SCNC: 2 MMOL/L (ref 4–13)
AST SERPL W P-5'-P-CCNC: 18 U/L (ref 5–45)
BASOPHILS # BLD AUTO: 0.04 THOUSANDS/ÂΜL (ref 0–0.1)
BASOPHILS NFR BLD AUTO: 1 % (ref 0–1)
BILIRUB SERPL-MCNC: 0.68 MG/DL (ref 0.2–1)
BUN SERPL-MCNC: 32 MG/DL (ref 5–25)
CALCIUM SERPL-MCNC: 9.6 MG/DL (ref 8.3–10.1)
CHLORIDE SERPL-SCNC: 103 MMOL/L (ref 96–108)
CHOLEST SERPL-MCNC: 155 MG/DL
CO2 SERPL-SCNC: 28 MMOL/L (ref 21–32)
CREAT SERPL-MCNC: 1.37 MG/DL (ref 0.6–1.3)
EOSINOPHIL # BLD AUTO: 0.18 THOUSAND/ÂΜL (ref 0–0.61)
EOSINOPHIL NFR BLD AUTO: 2 % (ref 0–6)
ERYTHROCYTE [DISTWIDTH] IN BLOOD BY AUTOMATED COUNT: 15.4 % (ref 11.6–15.1)
GFR SERPL CREATININE-BSD FRML MDRD: 37 ML/MIN/1.73SQ M
GLUCOSE P FAST SERPL-MCNC: 85 MG/DL (ref 65–99)
HCT VFR BLD AUTO: 42.1 % (ref 34.8–46.1)
HDLC SERPL-MCNC: 46 MG/DL
HGB BLD-MCNC: 13.4 G/DL (ref 11.5–15.4)
IMM GRANULOCYTES # BLD AUTO: 0.01 THOUSAND/UL (ref 0–0.2)
IMM GRANULOCYTES NFR BLD AUTO: 0 % (ref 0–2)
LDLC SERPL CALC-MCNC: 86 MG/DL (ref 0–100)
LYMPHOCYTES # BLD AUTO: 0.48 THOUSANDS/ÂΜL (ref 0.6–4.47)
LYMPHOCYTES NFR BLD AUTO: 6 % (ref 14–44)
MCH RBC QN AUTO: 32.4 PG (ref 26.8–34.3)
MCHC RBC AUTO-ENTMCNC: 31.8 G/DL (ref 31.4–37.4)
MCV RBC AUTO: 102 FL (ref 82–98)
MONOCYTES # BLD AUTO: 0.79 THOUSAND/ÂΜL (ref 0.17–1.22)
MONOCYTES NFR BLD AUTO: 10 % (ref 4–12)
NEUTROPHILS # BLD AUTO: 6.14 THOUSANDS/ÂΜL (ref 1.85–7.62)
NEUTS SEG NFR BLD AUTO: 81 % (ref 43–75)
NONHDLC SERPL-MCNC: 109 MG/DL
NRBC BLD AUTO-RTO: 0 /100 WBCS
PLATELET # BLD AUTO: 268 THOUSANDS/UL (ref 149–390)
PMV BLD AUTO: 10.5 FL (ref 8.9–12.7)
POTASSIUM SERPL-SCNC: 4.2 MMOL/L (ref 3.5–5.3)
PROT SERPL-MCNC: 7.5 G/DL (ref 6.4–8.4)
RBC # BLD AUTO: 4.14 MILLION/UL (ref 3.81–5.12)
SODIUM SERPL-SCNC: 133 MMOL/L (ref 135–147)
TRIGL SERPL-MCNC: 114 MG/DL
TSH SERPL DL<=0.05 MIU/L-ACNC: 1.75 UIU/ML (ref 0.45–4.5)
WBC # BLD AUTO: 7.64 THOUSAND/UL (ref 4.31–10.16)

## 2023-05-31 DIAGNOSIS — K86.9 LESION OF PANCREAS: ICD-10-CM

## 2023-05-31 DIAGNOSIS — I25.5 ISCHEMIC CARDIOMYOPATHY: ICD-10-CM

## 2023-05-31 DIAGNOSIS — I48.19 PERSISTENT ATRIAL FIBRILLATION (HCC): ICD-10-CM

## 2023-05-31 DIAGNOSIS — D86.9 SARCOIDOSIS: Chronic | ICD-10-CM

## 2023-05-31 DIAGNOSIS — Z12.31 SCREENING MAMMOGRAM, ENCOUNTER FOR: ICD-10-CM

## 2023-05-31 DIAGNOSIS — R10.9 ABDOMINAL PAIN, UNSPECIFIED ABDOMINAL LOCATION: ICD-10-CM

## 2023-05-31 DIAGNOSIS — Z13.820 SCREENING FOR OSTEOPOROSIS: ICD-10-CM

## 2023-05-31 DIAGNOSIS — R91.8 OTHER NONSPECIFIC ABNORMAL FINDING OF LUNG FIELD: Primary | ICD-10-CM

## 2023-05-31 DIAGNOSIS — R93.89 ABNORMAL CT OF THE CHEST: ICD-10-CM

## 2023-05-31 DIAGNOSIS — I27.20 PULMONARY HYPERTENSION (HCC): ICD-10-CM

## 2023-05-31 DIAGNOSIS — J43.2 CENTRILOBULAR EMPHYSEMA (HCC): Chronic | ICD-10-CM

## 2023-05-31 DIAGNOSIS — I10 BENIGN ESSENTIAL HYPERTENSION: ICD-10-CM

## 2023-05-31 DIAGNOSIS — N18.31 STAGE 3A CHRONIC KIDNEY DISEASE (HCC): ICD-10-CM

## 2023-06-01 ENCOUNTER — TELEPHONE (OUTPATIENT)
Dept: FAMILY MEDICINE CLINIC | Facility: CLINIC | Age: 78
End: 2023-06-01

## 2023-06-01 NOTE — TELEPHONE ENCOUNTER
Patient requests her labs sent to the facility that is doing her MRI in [facility] for Dr Elvira Estrada   Faxed directly to Dr Elvira Estrada at [phone]

## 2023-06-20 DIAGNOSIS — R59.1 LYMPHADENOPATHY: Primary | ICD-10-CM

## 2023-06-20 NOTE — PROGRESS NOTES
Patient is aware of PET/CT results from [id number]  I explained in detail likely IR biopsy of  Lymph node  She is agreeable to the same  I am making referral to IR radiology

## 2023-06-29 ENCOUNTER — TELEPHONE (OUTPATIENT)
Dept: PULMONOLOGY | Facility: CLINIC | Age: 78
End: 2023-06-29

## 2023-06-29 NOTE — TELEPHONE ENCOUNTER
We called and spoke with patient  She has not heard from IR regarding biopsy yet  Referral was placed on 6/20  IR was contacted by Dr Flori Valverde, spoke with [facility]   She will get back to us regarding the referral

## 2023-07-03 ENCOUNTER — APPOINTMENT (OUTPATIENT)
Dept: LAB | Facility: CLINIC | Age: 78
End: 2023-07-03
Payer: COMMERCIAL

## 2023-07-03 LAB
ALBUMIN SERPL BCP-MCNC: 3.4 G/DL (ref 3.5–5)
ALP SERPL-CCNC: 59 U/L (ref 46–116)
ALT SERPL W P-5'-P-CCNC: 25 U/L (ref 12–78)
ANION GAP SERPL CALCULATED.3IONS-SCNC: 3 MMOL/L
AST SERPL W P-5'-P-CCNC: 20 U/L (ref 5–45)
BILIRUB SERPL-MCNC: 0.57 MG/DL (ref 0.2–1)
BUN SERPL-MCNC: 37 MG/DL (ref 5–25)
CALCIUM ALBUM COR SERPL-MCNC: 10.5 MG/DL (ref 8.3–10.1)
CALCIUM SERPL-MCNC: 10 MG/DL (ref 8.3–10.1)
CHLORIDE SERPL-SCNC: 102 MMOL/L (ref 96–108)
CO2 SERPL-SCNC: 29 MMOL/L (ref 21–32)
CREAT SERPL-MCNC: 1.69 MG/DL (ref 0.6–1.3)
ERYTHROCYTE [DISTWIDTH] IN BLOOD BY AUTOMATED COUNT: 15.2 % (ref 11.6–15.1)
GFR SERPL CREATININE-BSD FRML MDRD: 28 ML/MIN/1.73SQ M
GLUCOSE P FAST SERPL-MCNC: 93 MG/DL (ref 65–99)
HCT VFR BLD AUTO: 40.7 % (ref 34.8–46.1)
HGB BLD-MCNC: 13.3 G/DL (ref 11.5–15.4)
MCH RBC QN AUTO: 34.2 PG (ref 26.8–34.3)
MCHC RBC AUTO-ENTMCNC: 32.7 G/DL (ref 31.4–37.4)
MCV RBC AUTO: 105 FL (ref 82–98)
PLATELET # BLD AUTO: 277 THOUSANDS/UL (ref 149–390)
PMV BLD AUTO: 10.2 FL (ref 8.9–12.7)
POTASSIUM SERPL-SCNC: 4.4 MMOL/L (ref 3.5–5.3)
PROT SERPL-MCNC: 7.4 G/DL (ref 6.4–8.4)
RBC # BLD AUTO: 3.89 MILLION/UL (ref 3.81–5.12)
SODIUM SERPL-SCNC: 134 MMOL/L (ref 135–147)
WBC # BLD AUTO: 8.86 THOUSAND/UL (ref 4.31–10.16)

## 2023-07-04 LAB
CYSTATIN C SERPL-MCNC: 2.33 MG/L (ref 0.78–1.15)
GFR/BSA.PRED SERPLBLD CYS-BASED-ARV: 22 ML/MIN/1.73

## 2023-07-05 ENCOUNTER — APPOINTMENT (OUTPATIENT)
Dept: LAB | Facility: CLINIC | Age: 78
End: 2023-07-05
Payer: COMMERCIAL

## 2023-07-05 LAB
BACTERIA UR QL AUTO: NORMAL /HPF
BILIRUB UR QL STRIP: NEGATIVE
CLARITY UR: CLEAR
COLOR UR: NORMAL
GLUCOSE UR STRIP-MCNC: NEGATIVE MG/DL
HGB UR QL STRIP.AUTO: NEGATIVE
KETONES UR STRIP-MCNC: NEGATIVE MG/DL
LEUKOCYTE ESTERASE UR QL STRIP: NEGATIVE
NITRITE UR QL STRIP: NEGATIVE
NON-SQ EPI CELLS URNS QL MICRO: NORMAL /HPF
PH UR STRIP.AUTO: 5.5 [PH]
PROT UR STRIP-MCNC: NEGATIVE MG/DL
RBC #/AREA URNS AUTO: NORMAL /HPF
SP GR UR STRIP.AUTO: 1.01 (ref 1–1.03)
UROBILINOGEN UR STRIP-ACNC: <2 MG/DL
WBC #/AREA URNS AUTO: NORMAL /HPF

## 2023-07-11 ENCOUNTER — OFFICE VISIT (OUTPATIENT)
Dept: NEPHROLOGY | Facility: CLINIC | Age: 78
End: 2023-07-11
Payer: COMMERCIAL

## 2023-07-11 VITALS
DIASTOLIC BLOOD PRESSURE: 64 MMHG | HEART RATE: 79 BPM | WEIGHT: 212.8 LBS | HEIGHT: 58 IN | SYSTOLIC BLOOD PRESSURE: 112 MMHG | BODY MASS INDEX: 44.67 KG/M2

## 2023-07-11 DIAGNOSIS — N17.9 AKI (ACUTE KIDNEY INJURY) (HCC): Primary | ICD-10-CM

## 2023-07-11 DIAGNOSIS — I25.5 ISCHEMIC CARDIOMYOPATHY: ICD-10-CM

## 2023-07-11 DIAGNOSIS — E66.01 MORBID OBESITY, UNSPECIFIED OBESITY TYPE (HCC): ICD-10-CM

## 2023-07-11 DIAGNOSIS — I12.9 BENIGN HYPERTENSION WITH CHRONIC KIDNEY DISEASE, STAGE III (HCC): ICD-10-CM

## 2023-07-11 DIAGNOSIS — N18.31 STAGE 3A CHRONIC KIDNEY DISEASE (HCC): ICD-10-CM

## 2023-07-11 DIAGNOSIS — E87.1 HYPONATREMIA: ICD-10-CM

## 2023-07-11 DIAGNOSIS — E55.9 VITAMIN D DEFICIENCY: ICD-10-CM

## 2023-07-11 DIAGNOSIS — E03.9 ACQUIRED HYPOTHYROIDISM: ICD-10-CM

## 2023-07-11 DIAGNOSIS — N18.30 BENIGN HYPERTENSION WITH CHRONIC KIDNEY DISEASE, STAGE III (HCC): ICD-10-CM

## 2023-07-11 DIAGNOSIS — D86.9 SARCOIDOSIS: Chronic | ICD-10-CM

## 2023-07-11 DIAGNOSIS — E83.52 HYPERCALCEMIA: ICD-10-CM

## 2023-07-11 DIAGNOSIS — I48.19 PERSISTENT ATRIAL FIBRILLATION (HCC): ICD-10-CM

## 2023-07-11 DIAGNOSIS — J43.2 CENTRILOBULAR EMPHYSEMA (HCC): ICD-10-CM

## 2023-07-11 PROBLEM — E87.5 HYPERKALEMIA: Status: ACTIVE | Noted: 2023-07-11

## 2023-07-11 PROCEDURE — 99214 OFFICE O/P EST MOD 30 MIN: CPT | Performed by: PHYSICIAN ASSISTANT

## 2023-07-11 NOTE — PROGRESS NOTES
Assessment and Plan:  MADDY on CKD:   -Etiology: unclear. Only recent change has been discontinuing torsemide and starting spironolactone. Rule out obstructive uropathy given symptoms of incomplete bladder emptying. + Sarcoidosis  -current creatinine 1.69, elevated above baseline. Creatinine in April 1.4 and trending up.  -baseline creatinine 0.9-1.2  -workup: UA bland  -Imaging:  No recent renal imaging  -Volume status euvolemic  -Avoid nephrotoxins, NSAIDs, IV contrast if possible  -Avoid hypotension  -Optimize hemodynamic status to avoid delay in renal recovery  -Check renal ultrasound  -repeat BMP in 2 weeks. Will need to hold diuretics and possibly Entresto if persistently elevated. -follow-up in office in 3 months with Dr. Walter Jones with repeat blood and urine studies. 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  -Baseline creatinine 0.9-1.2  -Follows with Dr. Walter Jones  -Cystatin C 2.33, GFR 22. Hypertension/Volume status:  -BP well controlled and at goal  -volume status euvolemic  -current medications: Toprol-XL 50 mg daily, spironolactone 25 mg daily, Entresto 49-51 mg twice daily  -changes: Continue current regimen for now. May need to hold spironolactone and Entresto if renal function worsens  -Avoid NSAIDs  -Avoid hypotension or fluctuations in blood pressure.   -low sodium (2 gm) diet.   Encourage regular exercise  -continue to monitor BP at home    Hyponatremia:  -Mild and stable  -Serum sodium 134  -Fluid restriction 1.5 L/day  -continue to monitor    Hypercalcemia:  -likely in the setting of sarcoidosis  -Stable  -Serum calcium 10, corrected 10.5  -Avoid calcium supplements  -Continue to monitor    Bone Mineral Disease of CKD:  -Vitamin D deficiency: Vitamin D 25 29.1 on 6/23/2021, below goal  -Check phosphorus and PTH  -continue to monitor    Sarcoidosis:  -Right cervical lymph node biopsy with noncaseating granulomas consistent with sarcoidosis  -Recent CT imaging with increase in size in right upper lobe nodule now measuring 1.4 cm, stable mediastinal and hilar lymphadenopathy  -Cardiac MRI with stable mediastinal and right hilar lymphadenopathy and no definitive evidence of cardiac or pulmonary sarcoid involvement. -PET/CT 6/16/2023: PET avid mediastinal, hilar and supraclavicular lymphadenopathy unchanged from October 2017. Retroperitoneal lymph nodes and not enlarged but with abnormal FDG uptake. Numerous focal areas of uptake in the skeletal system without defined lytic or blastic lesions. No definitive lung masses. Hyperinflation with increased interstitial markings with chronic change.  -Scheduled for IR lymph node biopsy 8/11/2023  -Management Per pulmonary    Centrilobar emphysema:  -Stable  -Follow-up with pulmonary  -Former smoker  -Not on home O2    CAD:  -stable without angina  -S/p CABG '17  -followup and management per Cardiology    HFrEF/Ischemic cardiomyopathy:  -Echo 3/8/2023: EF 55%, moderate aortic stenosis, severe TR, moderate to severe pulmonary hypertension  -S/p BiVICD  -On Entresto and Toprol XL. -Home diuretics: Spironolactone 25 mg daily. Torsemide discontinued and spironolactone added by cardiologist in [place] in the beginning of the year.  -Low-sodium (2 g) diet, monitor daily weights, fluid restriction  -Management per cardiology    Persistent Atrial fibrillation:  -stable, rate controlled  -on Eliquis and beta blockers  -management per Cardiology    Hypothyroidism:  -stable  -continue levothyroxine  -management per PCP    Morbid Obesity:  -continue to encourage weight management, lifestyle modifications and diet modifications to slow progession of CKD and proteinuria  -continue follow-up and management with PCP    Age related screening:    Your primary caregiver may do yearly screening for colorectal cancer. It is recommended in all men and women over 48years old. You may have screening earlier if you have colon disease or a family history of colorectal cancer    Marvella Schwab was seen today for follow-up. Diagnoses and all orders for this visit:    MADDY (acute kidney injury) ([address])  -     Renal function panel; Future  -     Comprehensive metabolic panel; Future  -     US kidney and bladder; Future    Stage 3a chronic kidney disease ([address])  -     Renal function panel; Future  -     Comprehensive metabolic panel; Future  -     CBC; Future  -     Magnesium; Future  -     Vitamin D 25 hydroxy; Future  -     PTH, intact; Future  -     Protein / creatinine ratio, urine; Future  -     Phosphorus; Future  -     US kidney and bladder; Future    Persistent atrial fibrillation (HCC)    Ischemic cardiomyopathy    Centrilobular emphysema (HCC)    Benign hypertension with chronic kidney disease, stage III (HCC)    Sarcoidosis  -     Renal function panel; Future  -     Comprehensive metabolic panel; Future    Morbid obesity, unspecified obesity type ([address])    Acquired hypothyroidism    Vitamin D deficiency  -     Vitamin D 25 hydroxy; Future    Hyponatremia  -     Renal function panel; Future  -     Comprehensive metabolic panel; Future    Hypercalcemia  -     Renal function panel; Future  -     Comprehensive metabolic panel; Future  -     Vitamin D 25 hydroxy; Future        Check BMP in 2 weeks. Obtain renal ultrasound. follow up with Dr. Deion Beavers in 3 months with repeat blood and urine studies. Please call the office with any questions or concerns. Reason for Visit: Follow-up (CKD2/3a)    HPI: Ashvin Cabrera is a 69 y.o. female with CKD 3A, HTN, CAD, s/p CABG, ICM, s/p ICD, pulmonary hypertension, emphysema, sarcoidosis, morbid obesity, vitamin D deficiency, atrial fibrillation on Eliquis, HERMANN, liver lesion, hypothyroidism who presents for follow up of CKD. Patient followed by Dr. Deion Beavers, last seen 1/5/2020.   Most recent creatinine 1.69, GFR 28, above baseline. Creatinine bumped in April to 1.4 and has been trending up. UA bland. Cystatin C 2.33, GFR 22. Patient denies recent hospitalizations or ER visits. Patient denies NSAID use. Patient denies nausea, vomiting, diarrhea, dyspnea, orthopnea, edema, hematuria or foamy urine. Patient does report chronic symptoms of incomplete bladder emptying and urgency. Patient previously on torsemide but discontinued began a year and changed to spironolactone by her cardiologist.      ROS: A complete review of systems was performed and was negative unless otherwise noted in the history of present illness. Allergies:   Patient has no known allergies.     Medications:     Current Outpatient Medications:   •  apixaban (ELIQUIS) 5 mg, Take 5 mg by mouth 2 (two) times a day 6/2/2022 stopped on 5/31/2022 for Procedure, Disp: , Rfl:   •  atorvastatin (LIPITOR) 10 mg tablet, take 1 tablet by mouth once daily, Disp: 90 tablet, Rfl: 2  •  cholecalciferol (VITAMIN D3) 1,000 units tablet, Take 2,000 Units by mouth daily, Disp: , Rfl:   •  Cyanocobalamin (Vitamin B 12) 100 MCG LOZG, Take 5,000 mcg by mouth in the morning, Disp: , Rfl:   •  glucosamine-chondroitin 500-400 MG tablet, Take 1 tablet by mouth daily , Disp: , Rfl:   •  levothyroxine 50 mcg tablet, take 1 tablet by mouth once daily, Disp: 90 tablet, Rfl: 2  •  metoprolol succinate (TOPROL-XL) 50 mg 24 hr tablet, take 1 tablet by mouth once daily, Disp: 90 tablet, Rfl: 2  •  Multiple Vitamins-Minerals (HAIR SKIN AND NAILS FORMULA PO), Take by mouth, Disp: , Rfl:   •  spironolactone (ALDACTONE) 25 mg tablet, Take 25 mg by mouth daily, Disp: , Rfl:   •  Cholecalciferol ([employer] VITAMIN D3 PO), Take by mouth (Patient not taking: Reported on 5/1/2023), Disp: , Rfl:   •  mupirocin (BACTROBAN) 2 % ointment, Apply topically 3 (three) times a day (Patient not taking: Reported on 7/11/2023), Disp: 22 g, Rfl: 0    Past Medical History:   Diagnosis Date   • Coronary artery disease    • 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HCC)     bullous   • Hypertension    • Obesity    • Sarcoidosis    • SOB (shortness of breath)      Past Surgical History:   Procedure Laterality Date   • CARDIAC SURGERY  06/02/2017   • CATARACT EXTRACTION Left 07/28/2019   • CYST REMOVAL      Lipoma   • DILATION AND CURETTAGE OF UTERUS     • EYE SURGERY     • INSERT / Jennifer Patter / REMOVE PACEMAKER  12/20/2017   • OVARIAN CYST REMOVAL       Family History   Problem Relation Age of Onset   • Emphysema Father    • Cancer Sister         ovarian   • Heart attack Brother    • Heart disease Family       reports that she quit smoking about 36 years ago. Her smoking use included cigarettes. She started smoking about 67 years ago. She has a 46.50 pack-year smoking history. She has never used smokeless tobacco. She reports current alcohol use. She reports that she does not use drugs. Physical Exam:   Vitals:    07/11/23 1558   BP: 112/64   BP Location: Left arm   Patient Position: Sitting   Cuff Size: Large   Pulse: 79   Weight: 96.5 kg (212 lb 12.8 oz)   Height: 4' 10" (1.473 m)     Body mass index is 44.48 kg/m². General:  Awake, alert, appears comfortable and in no acute distress. Nontoxic. Obese  Skin:  No rash, warm, good skin turgor   Eyes:  PERRL, EOMI, sclerae nonicteric.  no periorbital edema   ENT:  Moist mucous membranes  Neck:  Trachea midline, symmetric. No JVD. No carotid bruits. Chest:  Clear to auscultation bilaterally without wheezes, crackles or rhonchi  CVS:  Regular rate and rhythm without murmur, gallop or rub. S1 and S2 identified and normal.  No S3, S4.   Abdomen:  Soft, nontender, nondistended without masses. Normal bowel sounds x 4 quadrants. No bruit. Extremities:  Warm, pink, motor and sensory intact and well perfused. No cyanosis, pallor. No BLE edema. Neuro:  Awake, alert, oriented x3. Grossly intact  Psych:  Appropriate affect. Mentating appropriately.   Normal mental status exam      Procedure:  No results found for this or any previous visit. Lab Results   Component Value Date    CALCIUM 10.0 07/03/2023    K 4.4 07/03/2023    CO2 29 07/03/2023     07/03/2023    BUN 37 (H) 07/03/2023    CREATININE 1.69 (H) 07/03/2023             Invalid input(s): "ALBUMIN"    EMR, including Epic, Care Everywhere and outside scanned documents reviewed. I have personally reviewed the blood work as stated above and in my note. I have personally reviewed PCP, consultants and prior nephrology notes.

## 2023-07-11 NOTE — PATIENT INSTRUCTIONS
Your kidney function has worsened since April. Most recent creatinine 1.69, above baseline of 0.9-1.2. Repeat labs in 2 weeks. We will continue routine surveillance as outlined below.  -check renal ultrasound  Hold off on [id number] [address] Ybarra for now given increase in creatinine. Avoid all NSAIDs to include ibuprofen, Motrin, Aleve, Advil, Naproxen, Celebrex, Indomethacin, Toradol. Stay well hydrated. Continue all prescribed medications. Call if blood pressure consistently more than 140/90 or less than 110/50s. High and low blood pressures may affect your kidney function. Recommend low salt diet (2 gm sodium diet). Please let us know if you are scheduled for any studies with IV contrast (ex: CT scan, arteriogram or cardiac catheterization)   Follow up in 3 months with Dr. Ally Oneill with repeat labs prior to appointment. .  Please contact the office with new symptoms or concerns.

## 2023-07-12 ENCOUNTER — TELEPHONE (OUTPATIENT)
Dept: NEPHROLOGY | Facility: CLINIC | Age: 78
End: 2023-07-12

## 2023-07-12 NOTE — TELEPHONE ENCOUNTER
Called patient and gave her prep and appt. Time for US at [facility]. July 14th @ 11:00. Also provided her an appointment with Kely Main in [place] Location 10/27/2023 @ 4:00. She did ask to see Lane Majano wanting an earlier in the day appointment.  This was approved to be seen at the 4:00 with Dr. Kely Rosa.

## 2023-07-14 ENCOUNTER — HOSPITAL ENCOUNTER (OUTPATIENT)
Dept: RADIOLOGY | Facility: HOSPITAL | Age: 78
Discharge: HOME | End: 2023-07-14
Payer: COMMERCIAL

## 2023-07-14 DIAGNOSIS — N18.31 STAGE 3A CHRONIC KIDNEY DISEASE (HCC): ICD-10-CM

## 2023-07-14 DIAGNOSIS — N17.9 AKI (ACUTE KIDNEY INJURY) (HCC): ICD-10-CM

## 2023-07-14 PROCEDURE — 76775 US EXAM ABDO BACK WALL LIM: CPT

## 2023-07-19 ENCOUNTER — OFFICE VISIT (OUTPATIENT)
Dept: PULMONOLOGY | Facility: MEDICAL CENTER | Age: 78
End: 2023-07-19
Payer: COMMERCIAL

## 2023-07-19 VITALS
SYSTOLIC BLOOD PRESSURE: 122 MMHG | DIASTOLIC BLOOD PRESSURE: 96 MMHG | BODY MASS INDEX: 44.33 KG/M2 | RESPIRATION RATE: 12 BRPM | OXYGEN SATURATION: 93 % | WEIGHT: 211.2 LBS | HEIGHT: 58 IN | HEART RATE: 79 BPM

## 2023-07-19 DIAGNOSIS — I27.20 PULMONARY HYPERTENSION (HCC): ICD-10-CM

## 2023-07-19 DIAGNOSIS — D86.9 SARCOIDOSIS: Chronic | ICD-10-CM

## 2023-07-19 DIAGNOSIS — G47.33 OBSTRUCTIVE SLEEP APNEA: Primary | Chronic | ICD-10-CM

## 2023-07-19 DIAGNOSIS — R06.02 SHORTNESS OF BREATH: Chronic | ICD-10-CM

## 2023-07-19 DIAGNOSIS — J43.2 CENTRILOBULAR EMPHYSEMA (HCC): Chronic | ICD-10-CM

## 2023-07-19 PROBLEM — J43.9 EMPHYSEMA LUNG (HCC): Chronic | Status: ACTIVE | Noted: 2017-09-25

## 2023-07-19 LAB
DME PARACHUTE DELIVERY DATE REQUESTED: NORMAL
DME PARACHUTE ITEM DESCRIPTION: NORMAL
DME PARACHUTE ORDER STATUS: NORMAL
DME PARACHUTE SUPPLIER NAME: NORMAL
DME PARACHUTE SUPPLIER PHONE: NORMAL

## 2023-07-19 PROCEDURE — 99214 OFFICE O/P EST MOD 30 MIN: CPT | Performed by: NURSE PRACTITIONER

## 2023-07-19 RX ORDER — TORSEMIDE 20 MG/1
20 TABLET ORAL DAILY
COMMUNITY

## 2023-07-19 RX ORDER — SACUBITRIL AND VALSARTAN 49; 51 MG/1; MG/1
1 TABLET, FILM COATED ORAL 2 TIMES DAILY
COMMUNITY
Start: 2023-07-01

## 2023-07-19 RX ORDER — AMOXICILLIN 500 MG/1
CAPSULE ORAL
COMMUNITY
Start: 2023-07-18

## 2023-07-19 NOTE — ASSESSMENT & PLAN NOTE
She had a recent PET/CT that was done at the [facility] system. Essentially she has significant mediastinal adenopathy but also a right axillary lymph node with SUV of 9.2. Patient is scheduled for a IR biopsy in August 2023 at [address] [facility]. Questions about how this procedure would be done. Did explain to her that likely one of the lymph nodes that are easily accessible via supraclavicular and or axillary would be able to be obtained. Was under the impression that she would need to be intubated with general anesthesia as she has mediastinal nodes.   Awaiting information from IR to give back to her and answer her questions she is agreeable with this plan of care

## 2023-07-19 NOTE — ASSESSMENT & PLAN NOTE
Currently patient had a recent sleep study at the [facility] system. I did review her cardiology's notes at great length. Ruth Epps appears to be indecisive and may be perhaps confused. She absolutely does not want a CPAP. She does apparently have severe hypoxemia associated at at bedtime. Planed to her that I would need a nocturnal pulse oximetry in order to prescribe supplemental O2 at at bedtime. She insists that she had 1 in a different location. So explained to her that I would need to review her home sleep study at the very least but this would not allow me to supplemental oxygen.

## 2023-07-19 NOTE — PROGRESS NOTES
Assessment/Plan:     Problem List Items Addressed This Visit        Respiratory    Obstructive sleep apnea - Primary (Chronic)     Currently patient had a recent sleep study at the [facility]. I did review her cardiology's notes at great length. Maddi Richardson appears to be indecisive and may be perhaps confused. She absolutely does not want a CPAP. She does apparently have severe hypoxemia associated at at bedtime. Planed to her that I would need a nocturnal pulse oximetry in order to prescribe supplemental O2 at at bedtime. She insists that she had 1 in a different location. So explained to her that I would need to review her home sleep study at the very least but this would not allow me to supplemental oxygen. Emphysema lung (HCC) (Chronic)     3 view report and results of her recent PET/CT. He does have hyperinflation noted with increased interstitial markings suggesting chronic changes there were no masses seen. Patient currently does not take any medication. Does have shortness of breath and this is likely multifactorial            Other    Sarcoidosis (Chronic)     She had a recent PET/CT that was done at the [facility]. Essentially she has significant mediastinal adenopathy but also a right axillary lymph node with SUV of 9.2. Patient is scheduled for a IR biopsy in August 2023 at [address] [facility]. Questions about how this procedure would be done. Did explain to her that likely one of the lymph nodes that are easily accessible via supraclavicular and or axillary would be able to be obtained. Was under the impression that she would need to be intubated with general anesthesia as she has mediastinal nodes. Awaiting information from IR to give back to her and answer her questions she is agreeable with this plan of care         Shortness of breath (Chronic)     Was recently seen by cardiologist at [facility]. Reviewed notes extensively.   As of breath is multifactorial.  He does have pulmonary hypertension. She has a low ejection fraction and had an ICD discussed with patient in March 2023. pH is likely a combination of left-sided heart disease with moderate aortic stenosis and lung disease with known sarcoidosis and untreated HERMANN. She however reports that she does use oxygen on rare occasion and was very distant that I contact her cardiologist for which I received notes. Apparently she did have a visit that I reviewed              No follow-ups on file. All questions are answered to the patient's satisfaction and understanding. She verbalizes understanding. She is encouraged to call with any further questions or concerns. Portions of the record may have been created with voice recognition software. Occasional wrong word or "sound a like" substitutions may have occurred due to the inherent limitations of voice recognition software. Read the chart carefully and recognize, using context, where substitutions have occurred. Electronically Signed by LILLIANA Jacobo    ______________________________________________________________________    Chief Complaint: No chief complaint on file. Patient ID: Iqra Christiansen is a 69 y.o. y.o. female has a past medical history of Coronary artery disease, 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[address]), Hypertension, Obesity, Sarcoidosis, and SOB (shortness of breath). 7/19/2023  Patient presents today for follow-up visit. LASHELL Christiansen is a 67-year-old female who is here today for follow-up. He had a PET/CT that was done at the [address] [place] [address]. This patient has a history of sarcoidosis. There were several bilateral supraclavicular lymph nodes there was a 12 x 25 mm node on the right with SUV of 7.5.  14 x 18 mm node on the left with SUV of 8. In the chest there is significant mediastinal adenopathy.   Tracheal lymph node measured 2.6 x 3.6 cm with SUV of 10 also has a right axillary lymph node measuring 10 x 16 with SUV of 9.2. It was also noted there was hyperinflation in the chest with increased interstitial markings suggesting chronic change. There were no definite pulmonary masses noted. In the abdomen there were numerous lymph nodes throughout the abdomen measuring less than a centimeter with F DG uptake greater than 3. And no abnormal I pelvic nodes noted. There were also numerous focal areas of uptake within the spine pelvis bones and femurs. Since her last visit she has been following with cardiology at the [address] [facility] [address]. Currently patient did have a repeat sleep study. This was done as a home study but I do not have results. It appears that she had moderate HERMANN with severe desaturation. Suggested that we order CPAP once the results are available but she defers. Review of Systems   Constitutional: Negative. HENT: Negative. Eyes: Negative. Respiratory: Positive for shortness of breath. Cardiovascular: Positive for leg swelling. Gastrointestinal: Negative. Endocrine: Negative. Genitourinary: Negative. Musculoskeletal: Negative. Allergic/Immunologic: Negative. Neurological: Negative. Hematological: Negative. Psychiatric/Behavioral: Negative. Smoking history: She reports that she quit smoking about 36 years ago. Her smoking use included cigarettes. She started smoking about 67 years ago. She has a 46.50 pack-year smoking history.  She has never used smokeless tobacco.    The following portions of the patient's history were reviewed and updated as appropriate: allergies, current medications, past family history, past medical history, past social history, past surgical history and problem list.    Immunization History   Administered Date(s) Administered   • COVID-19 MODERNA VACC 0.5 ML IM 02/25/2021, 03/26/2021   • Influenza Split High Dose Preservative Free IM 10/01/2014   • Influenza, high dose seasonal 0.7 mL 12/13/2021, 11/02/2022   • Influenza, recombinant, quadrivalent,injectable, preservative free 10/09/2019   • Influenza, seasonal, injectable 09/10/2013   • Pneumococcal 10/09/2018   • Pneumococcal Conjugate 13-Valent 10/09/2017   • Pneumococcal Conjugate PCV 7 10/09/2018   • Pneumococcal Polysaccharide PPV23 10/01/2014   • TD (adult) Preservative Free 10/14/2019   • Tuberculin Skin Test 06/15/2017, 06/22/2017     Current Outpatient Medications   Medication Sig Dispense Refill   • amoxicillin (AMOXIL) 500 mg capsule take 1 capsule by mouth three times a day for 7 days     • apixaban (ELIQUIS) 5 mg Take 5 mg by mouth 2 (two) times a day 6/2/2022 stopped on 5/31/2022 for Procedure     • atorvastatin (LIPITOR) 10 mg tablet take 1 tablet by mouth once daily 90 tablet 2   • Entresto 49-51 MG TABS Take 1 tablet by mouth 2 (two) times a day     • glucosamine-chondroitin 500-400 MG tablet Take 1 tablet by mouth daily      • levothyroxine 50 mcg tablet take 1 tablet by mouth once daily 90 tablet 2   • metoprolol succinate (TOPROL-XL) 50 mg 24 hr tablet take 1 tablet by mouth once daily 90 tablet 2   • Multiple Vitamins-Minerals (HAIR SKIN AND NAILS FORMULA PO) Take by mouth     • spironolactone (ALDACTONE) 25 mg tablet Take 25 mg by mouth daily     • torsemide (DEMADEX) 20 mg tablet Take 20 mg by mouth daily     • Cholecalciferol ([employer] VITAMIN D3 PO) Take by mouth (Patient not taking: Reported on 5/1/2023)     • cholecalciferol (VITAMIN D3) 1,000 units tablet Take 2,000 Units by mouth daily     • Cyanocobalamin (Vitamin B 12) 100 MCG LOZG Take 5,000 mcg by mouth in the morning (Patient not taking: Reported on 7/19/2023)     • mupirocin (BACTROBAN) 2 % ointment Apply topically 3 (three) times a day (Patient not taking: Reported on 7/11/2023) 22 g 0     No current facility-administered medications for this visit. Allergies: Patient has no known allergies.     Objective:  Vitals:    07/19/23 1131   BP: 122/96   Pulse: 79   Resp: 12   SpO2: 93%   Weight: 95.8 kg (211 lb 3.2 oz)   Height: 4' 10" (1.473 m)   Oxygen Therapy  SpO2: 93 %  . Wt Readings from Last 3 Encounters:   07/19/23 95.8 kg (211 lb 3.2 oz)   07/11/23 96.5 kg (212 lb 12.8 oz)   05/01/23 96.2 kg (212 lb)     Body mass index is 44.14 kg/m². Physical Exam  Constitutional:       Appearance: She is obese. HENT:      Head: Normocephalic. Mouth/Throat:      Mouth: Mucous membranes are moist.      Pharynx: Oropharynx is clear. Eyes:      Pupils: Pupils are equal, round, and reactive to light. Cardiovascular:      Rate and Rhythm: Normal rate and regular rhythm. Pulses: Normal pulses. Pulmonary:      Effort: Pulmonary effort is normal.      Breath sounds: Normal breath sounds. Musculoskeletal:         General: Swelling present. Normal range of motion. Skin:     General: Skin is warm. Capillary Refill: Capillary refill takes less than 2 seconds. Neurological:      General: No focal deficit present. Mental Status: She is alert and oriented to person, place, and time.    Psychiatric:         Mood and Affect: Mood normal.         Behavior: Behavior normal.         Lab Review:   Orders Only on 05/31/2023   Component Date Value   • WBC 07/03/2023 8.86    • RBC 07/03/2023 3.89    • Hemoglobin 07/03/2023 13.3    • Hematocrit 07/03/2023 40.7    • MCV 07/03/2023 105 (H)    • MCH 07/03/2023 34.2    • MCHC 07/03/2023 32.7    • RDW 07/03/2023 15.2 (H)    • Platelets 01/71/2396 277    • MPV 07/03/2023 10.2    • Sodium 07/03/2023 134 (L)    • Potassium 07/03/2023 4.4    • Chloride 07/03/2023 102    • CO2 07/03/2023 29    • ANION GAP 07/03/2023 3    • BUN 07/03/2023 37 (H)    • Creatinine 07/03/2023 1.69 (H)    • Glucose, Fasting 07/03/2023 93    • Calcium 07/03/2023 10.0    • Corrected Calcium 07/03/2023 10.5 (H)    • AST 07/03/2023 20    • ALT 07/03/2023 25    • Alkaline Phosphatase 07/03/2023 59    • Total Protein 07/03/2023 7.4    • Albumin 07/03/2023 3.4 (L)    • Total Bilirubin 07/03/2023 0.57    • eGFR 07/03/2023 28    • Color, UA 07/05/2023 Light Yellow    • Clarity, UA 07/05/2023 Clear    • Specific Gravity, UA 07/05/2023 1.012    • pH, UA 07/05/2023 5.5    • Leukocytes, UA 07/05/2023 Negative    • Nitrite, UA 07/05/2023 Negative    • Protein, UA 07/05/2023 Negative    • Glucose, UA 07/05/2023 Negative    • Ketones, UA 07/05/2023 Negative    • Urobilinogen, UA 07/05/2023 <2.0    • Bilirubin, UA 07/05/2023 Negative    • Occult Blood, UA 07/05/2023 Negative    • RBC, UA 07/05/2023 1-2    • WBC, UA 07/05/2023 None Seen    • Epithelial Cells 07/05/2023 Occasional    • Bacteria, UA 07/05/2023 None Seen    • CYSTATIN C 07/03/2023 2.33 (H)    • eGFR 07/03/2023 22 (L)    Appointment on 05/25/2023   Component Date Value   • TSH 3RD GENERATON 05/25/2023 1.750    • Sodium 05/25/2023 133 (L)    • Potassium 05/25/2023 4.2    • Chloride 05/25/2023 103    • CO2 05/25/2023 28    • ANION GAP 05/25/2023 2 (L)    • BUN 05/25/2023 32 (H)    • Creatinine 05/25/2023 1.37 (H)    • Glucose, Fasting 05/25/2023 85    • Calcium 05/25/2023 9.6    • AST 05/25/2023 18    • ALT 05/25/2023 28    • Alkaline Phosphatase 05/25/2023 59    • Total Protein 05/25/2023 7.5    • Albumin 05/25/2023 3.5    • Total Bilirubin 05/25/2023 0.68    • eGFR 05/25/2023 37    • WBC 05/25/2023 7.64    • RBC 05/25/2023 4.14    • Hemoglobin 05/25/2023 13.4    • Hematocrit 05/25/2023 42.1    • MCV 05/25/2023 102 (H)    • MCH 05/25/2023 32.4    • MCHC 05/25/2023 31.8    • RDW 05/25/2023 15.4 (H)    • MPV 05/25/2023 10.5    • Platelets 07/13/8979 268    • nRBC 05/25/2023 0    • Neutrophils Relative 05/25/2023 81 (H)    • Immat GRANS % 05/25/2023 0    • Lymphocytes Relative 05/25/2023 6 (L)    • Monocytes Relative 05/25/2023 10    • Eosinophils Relative 05/25/2023 2    • Basophils Relative 05/25/2023 1    • Neutrophils Absolute 05/25/2023 6.14    • Immature Grans Absolute 05/25/2023 0.01    • Lymphocytes Absolute 05/25/2023 0.48 (L)    • Monocytes Absolute 05/25/2023 0.79    • Eosinophils Absolute 05/25/2023 0.18    • Basophils Absolute 05/25/2023 0.04    • Cholesterol 05/25/2023 155    • Triglycerides 05/25/2023 114    • HDL, Direct 05/25/2023 46 (L)    • LDL Calculated 05/25/2023 86    • Non-HDL-Chol (CHOL-HDL) 05/25/2023 109    Appointment on 04/17/2023   Component Date Value   • Sodium 04/17/2023 134 (L)    • Potassium 04/17/2023 3.9    • Chloride 04/17/2023 98    • CO2 04/17/2023 29    • ANION GAP 04/17/2023 7    • BUN 04/17/2023 39 (H)    • Creatinine 04/17/2023 1.43 (H)    • Glucose 04/17/2023 95    • Calcium 04/17/2023 10.5 (H)    • eGFR 04/17/2023 35    • Protime 04/17/2023 15.0 (H)    • INR 04/17/2023 1.16          Past Surgical History:   Procedure Laterality Date   • CARDIAC SURGERY  06/02/2017   • CATARACT EXTRACTION Left 07/28/2019   • CYST REMOVAL      Lipoma   • DILATION AND CURETTAGE OF UTERUS     • EYE SURGERY     • INSERT / Rolley Cousins / REMOVE PACEMAKER  12/20/2017   • OVARIAN CYST REMOVAL          Family History   Problem Relation Age of Onset   • Emphysema Father    • Cancer Sister         ovarian   • Heart attack Brother    • Heart disease Family         Diagnostics:  I have personally reviewed pertinent reports. Office Spirometry Results:     ESS:    US kidney and bladder    Result Date: 7/19/2023  Narrative: RENAL ULTRASOUND INDICATION:   N17.9: Acute kidney failure, unspecified N18.31: Chronic kidney disease, stage 3a. COMPARISON: None TECHNIQUE:   Ultrasound of the retroperitoneum was performed with a curvilinear transducer utilizing volumetric sweeps and still imaging techniques. FINDINGS: KIDNEYS: Symmetric and normal size. Right kidney:  10.9 x 4.8 x 5.5 cm. Volume 149.3 mL Left kidney:  9.7 x 4.8 x 4.8 cm. Volume 116.8 mL Right kidney Normal echogenicity and contour. No mass is identified. No hydronephrosis. No shadowing calculi. No perinephric fluid collections. Left kidney Normal echogenicity and contour. No mass is identified.  No hydronephrosis. Echogenic foci are seen within the left kidney which may represent intrarenal calculi versus vascular calcifications. No perinephric fluid collections. URETERS: Nonvisualized. BLADDER: Normally distended. No focal thickening or mass lesions. Bilateral ureteral jets detected. Incidentally noted is a calcification within the spleen. Impression: Echogenic foci within the left kidney which may represent intrarenal calculi versus vascular calcifications.  No evidence of hydronephrosis Workstation performed: [id number]

## 2023-07-19 NOTE — ASSESSMENT & PLAN NOTE
3 view report and results of her recent PET/CT. He does have hyperinflation noted with increased interstitial markings suggesting chronic changes there were no masses seen. Patient currently does not take any medication.   Does have shortness of breath and this is likely multifactorial

## 2023-07-19 NOTE — ASSESSMENT & PLAN NOTE
Was recently seen by cardiologist at [facility]. Reviewed notes extensively. As of breath is multifactorial.  He does have pulmonary hypertension. She has a low ejection fraction and had an ICD discussed with patient in March 2023. pH is likely a combination of left-sided heart disease with moderate aortic stenosis and lung disease with known sarcoidosis and untreated HERMANN. She however reports that she does use oxygen on rare occasion and was very distant that I contact her cardiologist for which I received notes.   Apparently she did have a visit that I reviewed

## 2023-07-20 NOTE — RESULT ENCOUNTER NOTE
Renal ultrasound reviewed. Please call patient and let her know renal ultrasound demonstrates normal kidneys. There is evidence of possible left kidney stone versus vascular calcifications. Please instruct patient to push hydration, follow low-salt diet.

## 2023-07-21 ENCOUNTER — TELEPHONE (OUTPATIENT)
Dept: NEPHROLOGY | Facility: CLINIC | Age: 78
End: 2023-07-21

## 2023-07-21 NOTE — TELEPHONE ENCOUNTER
----- Message from MARY Marie sent at 7/20/2023  4:05 PM EDT -----  Renal ultrasound reviewed. Please call patient and let her know renal ultrasound demonstrates normal kidneys. There is evidence of possible left kidney stone versus vascular calcifications. Please instruct patient to push hydration, follow low-salt diet.

## 2023-07-28 ENCOUNTER — APPOINTMENT (OUTPATIENT)
Dept: LAB | Facility: CLINIC | Age: 78
End: 2023-07-28
Payer: COMMERCIAL

## 2023-07-28 DIAGNOSIS — N18.31 STAGE 3A CHRONIC KIDNEY DISEASE (HCC): ICD-10-CM

## 2023-07-28 DIAGNOSIS — D86.9 SARCOIDOSIS: Chronic | ICD-10-CM

## 2023-07-28 DIAGNOSIS — N17.9 AKI (ACUTE KIDNEY INJURY) (HCC): ICD-10-CM

## 2023-07-28 DIAGNOSIS — E83.52 HYPERCALCEMIA: ICD-10-CM

## 2023-07-28 DIAGNOSIS — E87.1 HYPONATREMIA: ICD-10-CM

## 2023-07-28 LAB
ALBUMIN SERPL BCP-MCNC: 3.4 G/DL (ref 3.5–5)
ANION GAP SERPL CALCULATED.3IONS-SCNC: 5 MMOL/L
BUN SERPL-MCNC: 33 MG/DL (ref 5–25)
CALCIUM ALBUM COR SERPL-MCNC: 10.4 MG/DL (ref 8.3–10.1)
CALCIUM SERPL-MCNC: 9.9 MG/DL (ref 8.3–10.1)
CHLORIDE SERPL-SCNC: 103 MMOL/L (ref 96–108)
CO2 SERPL-SCNC: 30 MMOL/L (ref 21–32)
CREAT SERPL-MCNC: 1.62 MG/DL (ref 0.6–1.3)
GFR SERPL CREATININE-BSD FRML MDRD: 30 ML/MIN/1.73SQ M
GLUCOSE P FAST SERPL-MCNC: 92 MG/DL (ref 65–99)
PHOSPHATE SERPL-MCNC: 3.2 MG/DL (ref 2.3–4.1)
POTASSIUM SERPL-SCNC: 4.1 MMOL/L (ref 3.5–5.3)
SODIUM SERPL-SCNC: 138 MMOL/L (ref 135–147)

## 2023-07-28 PROCEDURE — 36415 COLL VENOUS BLD VENIPUNCTURE: CPT

## 2023-07-28 PROCEDURE — 80069 RENAL FUNCTION PANEL: CPT

## 2023-07-31 DIAGNOSIS — N18.31 STAGE 3A CHRONIC KIDNEY DISEASE (HCC): ICD-10-CM

## 2023-07-31 DIAGNOSIS — N17.9 AKI (ACUTE KIDNEY INJURY) (HCC): Primary | ICD-10-CM

## 2023-07-31 DIAGNOSIS — E83.52 HYPERCALCEMIA: ICD-10-CM

## 2023-08-01 ENCOUNTER — TELEPHONE (OUTPATIENT)
Dept: NEPHROLOGY | Facility: CLINIC | Age: 78
End: 2023-08-01

## 2023-08-01 DIAGNOSIS — E83.52 HYPERCALCEMIA: ICD-10-CM

## 2023-08-01 DIAGNOSIS — N18.31 STAGE 3A CHRONIC KIDNEY DISEASE (HCC): Primary | ICD-10-CM

## 2023-08-01 NOTE — RESULT ENCOUNTER NOTE
Labs reviewed. Creatinine remains elevated above baseline but stable. Calcium stable. Please call patient and let her know her labs are stable. Repeat BMP and ionized calcium in 1 month. Orders in epic.

## 2023-08-01 NOTE — TELEPHONE ENCOUNTER
----- Message from Christiane Baltazar PA-C sent at 7/31/2023 11:23 PM EDT -----  Labs reviewed. Creatinine remains elevated above baseline but stable. Calcium stable. Please call patient and let her know her labs are stable. Repeat BMP and ionized calcium in 1 month. Orders in epic.

## 2023-08-04 LAB
DME PARACHUTE DELIVERY DATE ACTUAL: NORMAL
DME PARACHUTE DELIVERY DATE REQUESTED: NORMAL
DME PARACHUTE ITEM DESCRIPTION: NORMAL
DME PARACHUTE ORDER STATUS: NORMAL
DME PARACHUTE SUPPLIER NAME: NORMAL
DME PARACHUTE SUPPLIER PHONE: NORMAL

## 2023-08-11 ENCOUNTER — HOSPITAL ENCOUNTER (OUTPATIENT)
Dept: NON INVASIVE DIAGNOSTICS | Facility: HOSPITAL | Age: 78
Discharge: HOME | End: 2023-08-11
Attending: RADIOLOGY
Payer: COMMERCIAL

## 2023-08-11 VITALS
HEART RATE: 81 BPM | SYSTOLIC BLOOD PRESSURE: 136 MMHG | RESPIRATION RATE: 18 BRPM | OXYGEN SATURATION: 95 % | DIASTOLIC BLOOD PRESSURE: 63 MMHG

## 2023-08-11 DIAGNOSIS — D86.9 SARCOIDOSIS: ICD-10-CM

## 2023-08-11 PROCEDURE — 38505 NEEDLE BIOPSY LYMPH NODES: CPT

## 2023-08-11 PROCEDURE — 87206 SMEAR FLUORESCENT/ACID STAI: CPT | Performed by: INTERNAL MEDICINE

## 2023-08-11 PROCEDURE — 87102 FUNGUS ISOLATION CULTURE: CPT | Performed by: INTERNAL MEDICINE

## 2023-08-11 PROCEDURE — 88333 PATH CONSLTJ SURG CYTO XM 1: CPT | Performed by: PATHOLOGY

## 2023-08-11 PROCEDURE — 87116 MYCOBACTERIA CULTURE: CPT | Performed by: INTERNAL MEDICINE

## 2023-08-11 PROCEDURE — 88305 TISSUE EXAM BY PATHOLOGIST: CPT | Performed by: PATHOLOGY

## 2023-08-11 PROCEDURE — 88312 SPECIAL STAINS GROUP 1: CPT | Performed by: PATHOLOGY

## 2023-08-11 PROCEDURE — 76942 ECHO GUIDE FOR BIOPSY: CPT | Performed by: RADIOLOGY

## 2023-08-11 PROCEDURE — 38505 NEEDLE BIOPSY LYMPH NODES: CPT | Performed by: RADIOLOGY

## 2023-08-11 RX ORDER — LIDOCAINE HYDROCHLORIDE 10 MG/ML
INJECTION, SOLUTION EPIDURAL; INFILTRATION; INTRACAUDAL; PERINEURAL AS NEEDED
Status: COMPLETED | OUTPATIENT
Start: 2023-08-11 | End: 2023-08-11

## 2023-08-11 RX ADMIN — LIDOCAINE HYDROCHLORIDE 8 ML: 10 INJECTION, SOLUTION EPIDURAL; INFILTRATION; INTRACAUDAL; PERINEURAL at 12:23

## 2023-08-11 NOTE — SEDATION DOCUMENTATION
7 passes from R axillary lymph node performed. Pt tolerated without issues. Bandaid applied to site.

## 2023-08-11 NOTE — DISCHARGE INSTRUCTIONS
POST BIOPSY    Care after your procedure:    1. Limit your activities for 24 hours after your biopsy. 2. No driving day of biopsy. 3. Return to your normal diet. Small sips of flat soda will help with mild nausea. 4. Remove band-aid or dressing 24 hours after procedure. Contact Interventional Radiology at [phone] Lluvia PATIENTS: Contact Interventional Radiology at [phone]) Nikhil Newby PATIENTS: Contact Interventional Radiology at [phone]) if:    1. Difficulty breathing, nausea or vomiting. 2. Chills or fever above 101 degrees F.     3. Pain at biopsy site not relieved by medication. 4. Develop any redness, swelling, heat, unusual drainage, heavy bruising or bleeding from biopsy site.

## 2023-08-11 NOTE — BRIEF OP NOTE (RAD/CATH)
INTERVENTIONAL RADIOLOGY PROCEDURE NOTE    Date: 8/11/2023    Procedure:   Procedure Summary     Date: 08/11/23 Room / Location: [address] Cardiac Cath Lab    Anesthesia Start:  Anesthesia Stop:     Procedure: IR BIOPSY LYMPH NODE Diagnosis:       Sarcoidosis      (supraclavicular lymphadenopaty, needs biopsy)    Scheduled Providers:  Responsible Provider:     Anesthesia Type: Not recorded ASA Status: Not recorded          Preoperative diagnosis:   1. Sarcoidosis         Postoperative diagnosis: Same. Surgeon: Emily De La Garza MD     Assistant: None. No qualified resident was available. Blood loss: Minimal    Specimens: 7 18 G cores     Findings: Right axillary lymph node, 7 samples taken. No bleeding post biopsy seen on US. Complications: None immediate.     Anesthesia: local

## 2023-08-12 LAB — RHODAMINE-AURAMINE STN SPEC: NORMAL

## 2023-08-14 ENCOUNTER — TELEPHONE (OUTPATIENT)
Dept: PULMONOLOGY | Facility: CLINIC | Age: 78
End: 2023-08-14

## 2023-08-14 LAB
FUNGUS SPEC CULT: NORMAL
SCAN RESULT: NORMAL

## 2023-08-14 NOTE — TELEPHONE ENCOUNTER
I called and spoke with \[facility]\"". She reports that the interventional radiology physician told her about the prescription. I advised her that our office would reach out to their department to have them call her to discuss. Please call the IR department and let them know to address.

## 2023-08-14 NOTE — TELEPHONE ENCOUNTER
Patient called stating that she had a biopsy done on Friday and they were supposed to sent her home with antibiotic but they did not. She was wondering if we could contact the performing provider for the prescription or if our doctor can send it in for her to the [employer] on file. Please advise.

## 2023-08-14 NOTE — TELEPHONE ENCOUNTER
Attempted to call patient to discuss. There was no answer. Message was left to return call to the office at [phone] at convenience.     LILLIANA Aguayo

## 2023-08-14 NOTE — TELEPHONE ENCOUNTER
Spoke with Kwabena Aguilar from [employer] and he will send message over to physician and they will reach out to patient.

## 2023-08-15 LAB
MYCOBACTERIUM SPEC CULT: NORMAL
RHODAMINE-AURAMINE STN SPEC: NORMAL

## 2023-08-15 PROCEDURE — 88312 SPECIAL STAINS GROUP 1: CPT | Performed by: PATHOLOGY

## 2023-08-15 PROCEDURE — 88333 PATH CONSLTJ SURG CYTO XM 1: CPT | Performed by: PATHOLOGY

## 2023-08-15 PROCEDURE — 88305 TISSUE EXAM BY PATHOLOGIST: CPT | Performed by: PATHOLOGY

## 2023-08-16 LAB — SCAN RESULT: NORMAL

## 2023-08-21 LAB — FUNGUS SPEC CULT: NORMAL

## 2023-08-22 LAB
MYCOBACTERIUM SPEC CULT: NORMAL
RHODAMINE-AURAMINE STN SPEC: NORMAL

## 2023-08-23 DIAGNOSIS — E03.9 HYPOTHYROIDISM, UNSPECIFIED TYPE: ICD-10-CM

## 2023-08-23 DIAGNOSIS — I48.91 ATRIAL FIBRILLATION, UNSPECIFIED TYPE (HCC): ICD-10-CM

## 2023-08-23 RX ORDER — METOPROLOL SUCCINATE 50 MG/1
TABLET, EXTENDED RELEASE ORAL
Qty: 90 TABLET | Refills: 2 | Status: SHIPPED | OUTPATIENT
Start: 2023-08-23

## 2023-08-23 RX ORDER — LEVOTHYROXINE SODIUM 0.05 MG/1
TABLET ORAL
Qty: 90 TABLET | Refills: 2 | Status: SHIPPED | OUTPATIENT
Start: 2023-08-23

## 2023-08-28 LAB — FUNGUS SPEC CULT: NORMAL

## 2023-09-05 LAB — FUNGUS SPEC CULT: NORMAL

## 2023-09-06 LAB
MYCOBACTERIUM SPEC CULT: NORMAL
RHODAMINE-AURAMINE STN SPEC: NORMAL

## 2023-09-11 LAB — FUNGUS SPEC CULT: NORMAL

## 2023-09-12 LAB
MYCOBACTERIUM SPEC CULT: NORMAL
RHODAMINE-AURAMINE STN SPEC: NORMAL

## 2023-09-14 ENCOUNTER — OFFICE VISIT (OUTPATIENT)
Dept: PULMONOLOGY | Facility: MEDICAL CENTER | Age: 78
End: 2023-09-14
Payer: COMMERCIAL

## 2023-09-14 VITALS
SYSTOLIC BLOOD PRESSURE: 110 MMHG | HEART RATE: 94 BPM | HEIGHT: 58 IN | RESPIRATION RATE: 16 BRPM | BODY MASS INDEX: 44.08 KG/M2 | OXYGEN SATURATION: 94 % | TEMPERATURE: 97.8 F | WEIGHT: 210 LBS | DIASTOLIC BLOOD PRESSURE: 60 MMHG

## 2023-09-14 DIAGNOSIS — G47.33 OBSTRUCTIVE SLEEP APNEA: Chronic | ICD-10-CM

## 2023-09-14 DIAGNOSIS — D86.9 SARCOIDOSIS: Primary | ICD-10-CM

## 2023-09-14 DIAGNOSIS — E66.01 CLASS 3 SEVERE OBESITY DUE TO EXCESS CALORIES WITH SERIOUS COMORBIDITY AND BODY MASS INDEX (BMI) OF 40.0 TO 44.9 IN ADULT (HCC): ICD-10-CM

## 2023-09-14 DIAGNOSIS — G47.34 HYPOXIA, SLEEP RELATED: ICD-10-CM

## 2023-09-14 DIAGNOSIS — R06.02 SHORTNESS OF BREATH: ICD-10-CM

## 2023-09-14 PROCEDURE — 99214 OFFICE O/P EST MOD 30 MIN: CPT | Performed by: INTERNAL MEDICINE

## 2023-09-14 PROCEDURE — 94010 BREATHING CAPACITY TEST: CPT | Performed by: INTERNAL MEDICINE

## 2023-09-14 NOTE — PROGRESS NOTES
Assessment/Plan        Problem List Items Addressed This Visit        Respiratory    Obstructive sleep apnea (Chronic)     She had home sleep study in March of this year which showed some moderate HERMANN with AHI of 25 and intermittent oxygen desaturation. She did not want to pursue CPAP at this time. Ana several years ago she also had sleep study showed mild HERMANN. I did encourage her to try to lose weight. 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 She has not been using her oxygen at bedtime. I did contact GUADALU[employer] Matamoros to give her longer oxygen tubing so that they can reach her concentrator which is downstairs so she can use 3 L/min at bedtime. She was agreeable to do this. After this is set up she can contact me and I can always order nocturnal oximetry with her using 3 L/min of oxygen at bedtime. She did not want to pursue CPAP at this time. Other    Sarcoidosis - Primary (Chronic)     She has history of sarcoidosis diagnosed in 2014 by right cervical lymph node biopsy done here at [address] [place] [address]. This showed noncaseating granulomas consistent with sarcoidosis. She is always had mediastinal and bilateral hilar adenopathy. She had PET CT scan done Sara 15 at [facility] which showed bilateral supraclavicular lymphadenopathy as well as mediastinal and bilateral hilar adenopathy. Also had right axillary adenopathy with lymph node being 10 x 16 mm. All of these had hypermetabolic activity and biopsy of this right shin lymph node on 8/11/2023 showed none necrotizing granulomatous inflammation consistent with sarcoidosis. There is no evidence of any interstitial lung disease on this PET CT scan. Spirometry today shows normal lung volumes. Serum ACE level done July 17, 2019 was 80 which is elevated prior to that was also elevated.   I did order a follow-up serum ACE level and also CMP. Relevant Orders    Angiotensin converting enzyme    Comprehensive metabolic panel    Shortness of breath (Chronic)     Dianelys does have mild oxygen desaturation with ambulation. Butash continuous oxygen 2 to 3 L/min with activity as needed and to monitor O2 saturations at home. Spirometry today shows normal lung volumes         Relevant Orders    POCT spirometry (Completed)    Class 3 severe obesity due to excess calories with serious comorbidity and body mass index (BMI) of 40.0 to 44.9 in adult [facility])     I did encourage Dianelys to try to lose weight and to watch her diet. HPI     Awilda Buckley presents for a follow-up visit today. On 8/11/23 she did have an ultrasound-guided biopsy of right axillary lymph node which was shown to be hypermetabolic on PET CT scan done 6/15/2023. This showed none necrotizing granulomatous inflammation with foreign body giant cells. No evidence of malignancy. Stains for acid-fast bacilli and fungi were negative    Awilda Buckley did have echocardiogram done on 3/8/2023 at outside institution and report indicated that there was at least moderate mitral insufficiency and significant degree of posterior leaflet calcification of the mitral valve. There was some mild mitral stenosis. There was moderate aortic stenosis and mild aortic insufficiency. Also there was significant tricuspid insufficiency and estimated right ventricular systolic pressure was between 50 to 60 mmHg. There is severe left atrial dilatation. Left ventricular ejection fraction appeared to be preserved at 55% with some septal dyskinesis. Right atrium was mildly dilated. A pacemaker lead was noted in the right ventricle. She does follow with cardiologist with [facility]. She does have an AICD implant.   She was seen by Dr. Mahi Ruiz July 12, 2023 and his note indicates that she had home sleep study which showed evidence of moderate HERMANN with severe oxygen desaturation. There is also concern possibility of cardiac involvement by sarcoidosis was considered and prescribed prednisone 30 mg daily for 3 months because of the patchy cardiac uptake on the PET scan. He was awaiting for his lymph node biopsy. 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  She does use 2 L/min on as-needed basis. Does have shortness of breath with exertion. She did have PET CT scan done Sara 15 at [facility]. This showed some bilateral supraclavicular lymphadenopathy, some mediastinal and bilateral hilar adenopathy and there was right axillary lymph node enlarged at 10 x 16 mm. These nodule had increased metabolic activity in the right axillary lymph node had SUV value of 9.2. No evidence of any interstitial lung disease. She also had some bilateral supraclavicular lymphadenopathy. She did have a home sleep study done elsewhere on March 30 of this year which showed overall AHI of 25 consistent with moderate HERMANN. She did have some moderate snoring and did have. Oxygen saturation with oxygen humza of 78%. .  She spent 51 minutes less than 90%. She also had a room air O2 nocturnal pulse oximetry done July 28 through [address] [place] [address] which showed average room air O2 saturation of 89% and humza of 75%. She spent 4 hours with O2 saturation less than or 88%. Last serum ACE level done July 17, 2019 was elevated at 93. Hemoglobin on July 3 of this year was 13.3 serum creatinine 1.62    She was initially diagnosed with sarcoidosis in 2014. At that time she had right cervical lymph node biopsy by surgeon Joseph Christianson which showed evidence of noncaseating granulomas consistent with sarcoidosis. She is always had some mediastinal and bilateral hilar adenopathy.     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   • Hypertension    • Obesity    • Sarcoidosis    • SOB (shortness of breath)        Past Surgical History:   Procedure Laterality Date   • CARDIAC SURGERY  06/02/2017   • CATARACT EXTRACTION Left 07/28/2019   • CYST REMOVAL      Lipoma   • DILATION AND CURETTAGE OF UTERUS     • EYE SURGERY     • Wes Ladjuliann / Michelle Phoebe / REMOVE PACEMAKER  12/20/2017   • IR BIOPSY LYMPH NODE  8/11/2023   • OVARIAN CYST REMOVAL           Current Outpatient Medications:   •  apixaban (ELIQUIS) 5 mg, Take 5 mg by mouth 2 (two) times a day 6/2/2022 stopped on 5/31/2022 for Procedure, Disp: , Rfl:   •  atorvastatin (LIPITOR) 10 mg tablet, take 1 tablet by mouth once daily, Disp: 90 tablet, Rfl: 2  •  cholecalciferol (VITAMIN D3) 1,000 units tablet, Take 2,000 Units by mouth daily, Disp: , Rfl:   •  Entresto 49-51 MG TABS, Take 1 tablet by mouth 2 (two) times a day, Disp: , Rfl:   •  glucosamine-chondroitin 500-400 MG tablet, Take 1 tablet by mouth daily , Disp: , Rfl:   •  levothyroxine 50 mcg tablet, take 1 tablet by mouth once daily, Disp: 90 tablet, Rfl: 2  •  metoprolol succinate (TOPROL-XL) 50 mg 24 hr tablet, take 1 tablet by mouth once daily, Disp: 90 tablet, Rfl: 2  •  Multiple Vitamins-Minerals (HAIR SKIN AND NAILS FORMULA PO), Take by mouth, Disp: , Rfl:   •  spironolactone (ALDACTONE) 25 mg tablet, Take 25 mg by mouth daily, Disp: , Rfl:   •  torsemide (DEMADEX) 20 mg tablet, Take 20 mg by mouth daily, Disp: , Rfl:   •  amoxicillin (AMOXIL) 500 mg capsule, take 1 capsule by mouth three times a day for 7 days (Patient not taking: Reported on 9/14/2023), Disp: , Rfl:   •  Cholecalciferol ([employer] VITAMIN D3 PO), Take by mouth, Disp: , Rfl:   •  Cyanocobalamin (Vitamin B 12) 100 MCG LOZG, Take 5,000 mcg by mouth in the morning (Patient not taking: Reported on 7/19/2023), Disp: , Rfl:   •  mupirocin (BACTROBAN) 2 % ointment, Apply topically 3 (three) times a day (Patient not taking: Reported on 7/11/2023), Disp: 22 g, Rfl: 0    No Known Allergies    Social History     Tobacco Use   • Smoking status: Former     Packs/day: 1.50     Years: 31.00     Total pack years: 46.50     Types: Cigarettes     Start date:      Quit date:      Years since quittin.7   • Smokeless tobacco: Never   Substance Use Topics   • Alcohol use: Yes     Comment: rare         Family History   Problem Relation Age of Onset   • Emphysema Father    • Cancer Sister         ovarian   • Heart attack Brother    • Heart disease Family        Review of Systems   Constitutional: Negative for activity change, appetite change, chills, fever and unexpected weight change. HENT: Negative for congestion, dental problem, postnasal drip, sinus pressure, sinus pain, sore throat and voice change. Eyes: Negative for visual disturbance. Respiratory: Positive for shortness of breath. See HPI   Cardiovascular: Negative for chest pain, palpitations and leg swelling. Gastrointestinal: Negative for abdominal pain, diarrhea, nausea and vomiting. Genitourinary: Negative for difficulty urinating. Musculoskeletal: Negative for arthralgias. Skin: Negative for rash and wound. Allergic/Immunologic: Negative for environmental allergies and food allergies. Neurological: Negative for dizziness, light-headedness and headaches. Hematological: Negative for adenopathy. Psychiatric/Behavioral: Negative for agitation, behavioral problems and confusion. Vitals:    23 1344   BP: 110/60   Pulse: 94   Resp: 16   Temp: 97.8 °F (36.6 °C)   SpO2: 94%     Height: 4' 10" (147.3 cm)  IBW (Ideal Body Weight): 40.9 kg  Body mass index is 43.89 kg/m². Weight (last 2 days)     Date/Time Weight    23 1344 95.3 (210)              Physical Exam  Constitutional:       General: She is not in acute distress. Appearance: Normal appearance. She is well-developed. She is obese. HENT:      Head: Normocephalic.       Right Ear: External ear normal.      Left Ear: External ear normal. Nose: Nose normal.      Mouth/Throat:      Mouth: Mucous membranes are moist.      Pharynx: Oropharynx is clear. No oropharyngeal exudate. Comments: Mallampati score is 2  Eyes:      Conjunctiva/sclera: Conjunctivae normal.      Pupils: Pupils are equal, round, and reactive to light. Cardiovascular:      Rate and Rhythm: Normal rate and regular rhythm. Heart sounds: Normal heart sounds. Pulmonary:      Effort: Pulmonary effort is normal.      Comments: Lung sounds are clear. No wheezes, crackles or rhonchi  Abdominal:      General: There is no distension. Palpations: Abdomen is soft. Tenderness: There is no abdominal tenderness. Musculoskeletal:      Cervical back: Neck supple. Comments: No edema, cyanosis or clubbing   Lymphadenopathy:      Cervical: No cervical adenopathy. Skin:     General: Skin is warm and dry. Neurological:      General: No focal deficit present. Mental Status: She is alert and oriented to person, place, and time. Psychiatric:         Mood and Affect: Mood normal.         Behavior: Behavior normal.         Thought Content:  Thought content normal.           Office Spirometry Results:  Done today    FVC - 1.97 L    97%  FEV1 - 1.42 L   95%  FEV!/FVC% - 72%    Normal lung volumes    Room air O2 saturation at rest was 94% and on room air after walking 200 feet lowest O2 saturation was 86%

## 2023-09-14 NOTE — PATIENT INSTRUCTIONS
I will see if I can get a longer oxygen tubing so you can wear your oxygen at 3 L/min at bedtime we will contact [address] [place] [address].     After this is done I will order nocturnal oxygen test with recording with you using oxygen at bedtime    Back telephone number   [phone]    Blood work serum Ace and CMP

## 2023-09-16 PROBLEM — E66.813 CLASS 3 SEVERE OBESITY DUE TO EXCESS CALORIES WITH SERIOUS COMORBIDITY AND BODY MASS INDEX (BMI) OF 40.0 TO 44.9 IN ADULT (HCC): Status: ACTIVE | Noted: 2018-01-14

## 2023-09-17 NOTE — ASSESSMENT & PLAN NOTE
She had home sleep study in March of this year which showed some moderate HERMANN with AHI of 25 and intermittent oxygen desaturation. She did not want to pursue CPAP at this time. Ana several years ago she also had sleep study showed mild HERMANN. I did encourage her to try to lose weight.

## 2023-09-17 NOTE — ASSESSMENT & PLAN NOTE
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 She has not been using her oxygen at bedtime. I did contact [employer]ADALU[employer] Matamoros to give her longer oxygen tubing so that they can reach her concentrator which is downstairs so she can use 3 L/min at bedtime. She was agreeable to do this. After this is set up she can contact me and I can always order nocturnal oximetry with her using 3 L/min of oxygen at bedtime. She did not want to pursue CPAP at this time.

## 2023-09-17 NOTE — ASSESSMENT & PLAN NOTE
She has history of sarcoidosis diagnosed in 2014 by right cervical lymph node biopsy done here at [address] [place] [address]. This showed noncaseating granulomas consistent with sarcoidosis. She is always had mediastinal and bilateral hilar adenopathy. She had PET CT scan done Sara 15 at [facility] which showed bilateral supraclavicular lymphadenopathy as well as mediastinal and bilateral hilar adenopathy. Also had right axillary adenopathy with lymph node being 10 x 16 mm. All of these had hypermetabolic activity and biopsy of this right shin lymph node on 8/11/2023 showed none necrotizing granulomatous inflammation consistent with sarcoidosis. There is no evidence of any interstitial lung disease on this PET CT scan. Spirometry today shows normal lung volumes. Serum ACE level done July 17, 2019 was 80 which is elevated prior to that was also elevated. I did order a follow-up serum ACE level and also CMP.

## 2023-09-17 NOTE — ASSESSMENT & PLAN NOTE
Clare Akhtar does have mild oxygen desaturation with ambulation. Butash continuous oxygen 2 to 3 L/min with activity as needed and to monitor O2 saturations at home.     Spirometry today shows normal lung volumes

## 2023-09-19 LAB
MYCOBACTERIUM SPEC CULT: NORMAL
RHODAMINE-AURAMINE STN SPEC: NORMAL

## 2023-09-26 LAB
MYCOBACTERIUM SPEC CULT: NORMAL
RHODAMINE-AURAMINE STN SPEC: NORMAL

## 2023-10-12 ENCOUNTER — LAB (OUTPATIENT)
Dept: LAB | Facility: CLINIC | Age: 78
End: 2023-10-12
Payer: COMMERCIAL

## 2023-10-12 DIAGNOSIS — N18.31 STAGE 3A CHRONIC KIDNEY DISEASE (HCC): ICD-10-CM

## 2023-10-12 DIAGNOSIS — E55.9 VITAMIN D DEFICIENCY: ICD-10-CM

## 2023-10-12 DIAGNOSIS — E83.52 HYPERCALCEMIA: ICD-10-CM

## 2023-10-12 DIAGNOSIS — D86.9 SARCOIDOSIS: ICD-10-CM

## 2023-10-12 LAB
25(OH)D3 SERPL-MCNC: 50.1 NG/ML (ref 30–100)
ALBUMIN SERPL BCP-MCNC: 4 G/DL (ref 3.5–5)
ALP SERPL-CCNC: 55 U/L (ref 34–104)
ALT SERPL W P-5'-P-CCNC: 16 U/L (ref 7–52)
ANION GAP SERPL CALCULATED.3IONS-SCNC: 9 MMOL/L
AST SERPL W P-5'-P-CCNC: 14 U/L (ref 13–39)
BILIRUB SERPL-MCNC: 0.57 MG/DL (ref 0.2–1)
BUN SERPL-MCNC: 42 MG/DL (ref 5–25)
CALCIUM SERPL-MCNC: 10.9 MG/DL (ref 8.4–10.2)
CHLORIDE SERPL-SCNC: 99 MMOL/L (ref 96–108)
CO2 SERPL-SCNC: 30 MMOL/L (ref 21–32)
CREAT SERPL-MCNC: 1.59 MG/DL (ref 0.6–1.3)
ERYTHROCYTE [DISTWIDTH] IN BLOOD BY AUTOMATED COUNT: 14.2 % (ref 11.6–15.1)
GFR SERPL CREATININE-BSD FRML MDRD: 31 ML/MIN/1.73SQ M
GLUCOSE P FAST SERPL-MCNC: 94 MG/DL (ref 65–99)
HCT VFR BLD AUTO: 41.7 % (ref 34.8–46.1)
HGB BLD-MCNC: 13.8 G/DL (ref 11.5–15.4)
MAGNESIUM SERPL-MCNC: 2.2 MG/DL (ref 1.9–2.7)
MCH RBC QN AUTO: 33.5 PG (ref 26.8–34.3)
MCHC RBC AUTO-ENTMCNC: 33.1 G/DL (ref 31.4–37.4)
MCV RBC AUTO: 101 FL (ref 82–98)
PHOSPHATE SERPL-MCNC: 3.7 MG/DL (ref 2.3–4.1)
PLATELET # BLD AUTO: 251 THOUSANDS/UL (ref 149–390)
PMV BLD AUTO: 10.6 FL (ref 8.9–12.7)
POTASSIUM SERPL-SCNC: 3.8 MMOL/L (ref 3.5–5.3)
PROT SERPL-MCNC: 7.3 G/DL (ref 6.4–8.4)
PTH-INTACT SERPL-MCNC: 8.8 PG/ML (ref 12–88)
RBC # BLD AUTO: 4.12 MILLION/UL (ref 3.81–5.12)
SODIUM SERPL-SCNC: 138 MMOL/L (ref 135–147)
WBC # BLD AUTO: 8.44 THOUSAND/UL (ref 4.31–10.16)

## 2023-10-12 PROCEDURE — 82164 ANGIOTENSIN I ENZYME TEST: CPT

## 2023-10-12 PROCEDURE — 83970 ASSAY OF PARATHORMONE: CPT

## 2023-10-12 PROCEDURE — 36415 COLL VENOUS BLD VENIPUNCTURE: CPT

## 2023-10-12 PROCEDURE — 82306 VITAMIN D 25 HYDROXY: CPT

## 2023-10-12 PROCEDURE — 80053 COMPREHEN METABOLIC PANEL: CPT

## 2023-10-12 PROCEDURE — 83735 ASSAY OF MAGNESIUM: CPT

## 2023-10-12 PROCEDURE — 85027 COMPLETE CBC AUTOMATED: CPT

## 2023-10-12 PROCEDURE — 84100 ASSAY OF PHOSPHORUS: CPT

## 2023-10-13 ENCOUNTER — APPOINTMENT (OUTPATIENT)
Dept: LAB | Facility: CLINIC | Age: 78
End: 2023-10-13
Payer: COMMERCIAL

## 2023-10-13 DIAGNOSIS — N18.31 STAGE 3A CHRONIC KIDNEY DISEASE (HCC): ICD-10-CM

## 2023-10-13 DIAGNOSIS — E83.52 HYPERCALCEMIA: Primary | ICD-10-CM

## 2023-10-13 LAB
ACE SERPL-CCNC: 77 U/L (ref 14–82)
CREAT UR-MCNC: 13.9 MG/DL
PROT UR-MCNC: <4 MG/DL

## 2023-10-13 PROCEDURE — 82570 ASSAY OF URINE CREATININE: CPT

## 2023-10-13 PROCEDURE — 84156 ASSAY OF PROTEIN URINE: CPT

## 2023-10-13 NOTE — RESULT ENCOUNTER NOTE
Labs reviewed. Renal function stable but creatinine remains above baseline since at least April. Potentially new baseline. Chronic hypercalcemia. Please instruct to stay well hydrated. Repeat labs in 1 month, CMP.

## 2023-10-16 ENCOUNTER — TELEPHONE (OUTPATIENT)
Dept: NEPHROLOGY | Facility: CLINIC | Age: 78
End: 2023-10-16

## 2023-10-16 NOTE — TELEPHONE ENCOUNTER
Pt advised that kidney function is stable, CR remains above baseline. Chronic hypercalcemia. Advised pt to stay well hydrated. Repeat CMP in one month per [place]. Order placed.    ----- Message from Donavan Cronin PA-C sent at 10/13/2023  4:55 PM EDT -----  Labs reviewed. Renal function stable but creatinine remains above baseline since at least April. Potentially new baseline. Chronic hypercalcemia. Please instruct to stay well hydrated. Repeat labs in 1 month, CMP.

## 2023-11-02 ENCOUNTER — OFFICE VISIT (OUTPATIENT)
Dept: FAMILY MEDICINE CLINIC | Facility: CLINIC | Age: 78
End: 2023-11-02
Payer: COMMERCIAL

## 2023-11-02 VITALS
HEIGHT: 58 IN | TEMPERATURE: 97.3 F | OXYGEN SATURATION: 92 % | RESPIRATION RATE: 16 BRPM | SYSTOLIC BLOOD PRESSURE: 110 MMHG | HEART RATE: 66 BPM | BODY MASS INDEX: 44.29 KG/M2 | DIASTOLIC BLOOD PRESSURE: 70 MMHG | WEIGHT: 211 LBS

## 2023-11-02 DIAGNOSIS — Z00.00 MEDICARE ANNUAL WELLNESS VISIT, SUBSEQUENT: Primary | ICD-10-CM

## 2023-11-02 DIAGNOSIS — Z23 ENCOUNTER FOR IMMUNIZATION: ICD-10-CM

## 2023-11-02 DIAGNOSIS — R10.32 LLQ PAIN: ICD-10-CM

## 2023-11-02 DIAGNOSIS — E03.9 HYPOTHYROIDISM, UNSPECIFIED TYPE: ICD-10-CM

## 2023-11-02 DIAGNOSIS — I10 BENIGN ESSENTIAL HYPERTENSION: ICD-10-CM

## 2023-11-02 PROCEDURE — G0008 ADMIN INFLUENZA VIRUS VAC: HCPCS | Performed by: FAMILY MEDICINE

## 2023-11-02 PROCEDURE — G0439 PPPS, SUBSEQ VISIT: HCPCS | Performed by: FAMILY MEDICINE

## 2023-11-02 PROCEDURE — 90662 IIV NO PRSV INCREASED AG IM: CPT | Performed by: FAMILY MEDICINE

## 2023-11-02 PROCEDURE — 99213 OFFICE O/P EST LOW 20 MIN: CPT | Performed by: FAMILY MEDICINE

## 2023-11-02 NOTE — PATIENT INSTRUCTIONS
Medicare Preventive Visit Patient Instructions  Thank you for completing your Welcome to Medicare Visit or Medicare Annual Wellness Visit today. Your next wellness visit will be due in one year (11/2/2024). The screening/preventive services that you may require over the next 5-10 years are detailed below. Some tests may not apply to you based off risk factors and/or age. Screening tests ordered at today's visit but not completed yet may show as past due. Also, please note that scanned in results may not display below. Preventive Screenings:  Service Recommendations Previous Testing/Comments   Colorectal Cancer Screening  * Colonoscopy    * Fecal Occult Blood Test (FOBT)/Fecal Immunochemical Test (FIT)  * Fecal DNA/Cologuard Test  * Flexible Sigmoidoscopy Age: 43-73 years old   Colonoscopy: every 10 years (may be performed more frequently if at higher risk)  OR  FOBT/FIT: every 1 year  OR  Cologuard: every 3 years  OR  Sigmoidoscopy: every 5 years  Screening may be recommended earlier than age 39 if at higher risk for colorectal cancer. Also, an individualized decision between you and your healthcare provider will decide whether screening between the ages of 77-80 would be appropriate. Colonoscopy: Not on file  FOBT/FIT: Not on file  Cologuard: Not on file  Sigmoidoscopy: Not on file          Breast Cancer Screening Age: 36 years old  Frequency: every 1-2 years  Not required if history of left and right mastectomy Mammogram: Not on file        Cervical Cancer Screening Between the ages of 21-29, pap smear recommended once every 3 years. Between the ages of 32-69, can perform pap smear with HPV co-testing every 5 years.    Recommendations may differ for women with a history of total hysterectomy, cervical cancer, or abnormal pap smears in past. Pap Smear: Not on file    Screening Not Indicated   Hepatitis C Screening Once for adults born between [address] [place] [address]  More frequently in patients at high risk for Hepatitis C Hep C Antibody: Not on file        Diabetes Screening 1-2 times per year if you're at risk for diabetes or have pre-diabetes Fasting glucose: 94 mg/dL (10/12/2023)  A1C: No results in last 5 years (No results in last 5 years)  Screening Current   Cholesterol Screening Once every 5 years if you don't have a lipid disorder. May order more often based on risk factors. Lipid panel: 05/25/2023    Screening Not Indicated  History Lipid Disorder     Other Preventive Screenings Covered by Medicare:  Abdominal Aortic Aneurysm (AAA) Screening: covered once if your at risk. You're considered to be at risk if you have a family history of AAA. Lung Cancer Screening: covers low dose CT scan once per year if you meet all of the following conditions: (1) Age 48-67; (2) No signs or symptoms of lung cancer; (3) Current smoker or have quit smoking within the last 15 years; (4) You have a tobacco smoking history of at least 20 pack years (packs per day multiplied by number of years you smoked); (5) You get a written order from a healthcare provider. Glaucoma Screening: covered annually if you're considered high risk: (1) You have diabetes OR (2) Family history of glaucoma OR (3)  aged 48 and older OR (3)  American aged 72 and older  Osteoporosis Screening: covered every 2 years if you meet one of the following conditions: (1) You're estrogen deficient and at risk for osteoporosis based off medical history and other findings; (2) Have a vertebral abnormality; (3) On glucocorticoid therapy for more than 3 months; (4) Have primary hyperparathyroidism; (5) On osteoporosis medications and need to assess response to drug therapy. Last bone density test (DXA Scan): 02/06/2023. HIV Screening: covered annually if you're between the age of 14-79. Also covered annually if you are younger than 13 and older than 72 with risk factors for HIV infection.  For pregnant patients, it is covered up to 3 times per pregnancy. Immunizations:  Immunization Recommendations   Influenza Vaccine Annual influenza vaccination during flu season is recommended for all persons aged >= 6 months who do not have contraindications   Pneumococcal Vaccine   * Pneumococcal conjugate vaccine = PCV13 (Prevnar 13), PCV15 (Vaxneuvance), PCV20 (Prevnar 20)  * Pneumococcal polysaccharide vaccine = PPSV23 (Pneumovax) Adults 13-65 yo with certain risk factors or if 69+ yo  If never received any pneumonia vaccine: recommend Prevnar 20 (PCV20)  Give PCV20 if previously received 1 dose of PCV13 or PPSV23   Hepatitis B Vaccine 3 dose series if at intermediate or high risk (ex: diabetes, end stage renal disease, liver disease)   Respiratory syncytial virus (RSV) Vaccine - COVERED BY MEDICARE PART D  * RSVPreF3 (Arexvy) CDC recommends that adults 61years of age and older may receive a single dose of RSV vaccine using shared clinical decision-making (SCDM)   Tetanus (Td) Vaccine - COST NOT COVERED BY MEDICARE PART B Following completion of primary series, a booster dose should be given every 10 years to maintain immunity against tetanus. Td may also be given as tetanus wound prophylaxis. Tdap Vaccine - COST NOT COVERED BY MEDICARE PART B Recommended at least once for all adults. For pregnant patients, recommended with each pregnancy. Shingles Vaccine (Shingrix) - COST NOT COVERED BY MEDICARE PART B  2 shot series recommended in those 19 years and older who have or will have weakened immune systems or those 50 years and older     Health Maintenance Due:      Topic Date Due   • Hepatitis C Screening  Never done   • Breast Cancer Screening: Mammogram  Never done   • Colorectal Cancer Screening  Discontinued     Immunizations Due:      Topic Date Due   • Influenza Vaccine (1) 09/01/2023     Advance Directives   What are advance directives? Advance directives are legal documents that state your wishes and plans for medical care.  These plans are made ahead of time in case you lose your ability to make decisions for yourself. Advance directives can apply to any medical decision, such as the treatments you want, and if you want to donate organs. What are the types of advance directives? There are many types of advance directives, and each state has rules about how to use them. You may choose a combination of any of the following:  Living will: This is a written record of the treatment you want. You can also choose which treatments you do not want, which to limit, and which to stop at a certain time. This includes surgery, medicine, IV fluid, and tube feedings. Durable power of  for healthcare [facility]): This is a written record that states who you want to make healthcare choices for you when you are unable to make them for yourself. This person, called a proxy, is usually a family member or a friend. You may choose more than 1 proxy. Do not resuscitate (DNR) order:  A DNR order is used in case your heart stops beating or you stop breathing. It is a request not to have certain forms of treatment, such as CPR. A DNR order may be included in other types of advance directives. Medical directive: This covers the care that you want if you are in a coma, near death, or unable to make decisions for yourself. You can list the treatments you want for each condition. Treatment may include pain medicine, surgery, blood transfusions, dialysis, IV or tube feedings, and a ventilator (breathing machine). Values history: This document has questions about your views, beliefs, and how you feel and think about life. This information can help others choose the care that you would choose. Why are advance directives important? An advance directive helps you control your care. Although spoken wishes may be used, it is better to have your wishes written down. Spoken wishes can be misunderstood, or not followed. Treatments may be given even if you do not want them.  An advance directive may make it easier for your family to make difficult choices about your care. Urinary Incontinence   Urinary incontinence (UI)  is when you lose control of your bladder. UI develops because your bladder cannot store or empty urine properly. The 3 most common types of UI are stress incontinence, urge incontinence, or both. Medicines:   May be given to help strengthen your bladder control. Report any side effects of medication to your healthcare provider. Do pelvic muscle exercises often:  Your pelvic muscles help you stop urinating. Squeeze these muscles tight for 5 seconds, then relax for 5 seconds. Gradually work up to squeezing for 10 seconds. Do 3 sets of 15 repetitions a day, or as directed. This will help strengthen your pelvic muscles and improve bladder control. Train your bladder:  Go to the bathroom at set times, such as every 2 hours, even if you do not feel the urge to go. You can also try to hold your urine when you feel the urge to go. For example, hold your urine for 5 minutes when you feel the urge to go. As that becomes easier, hold your urine for 10 minutes. Self-care:   Keep a UI record. Write down how often you leak urine and how much you leak. Make a note of what you were doing when you leaked urine. Drink liquids as directed. You may need to limit the amount of liquid you drink to help control your urine leakage. Do not drink any liquid right before you go to bed. Limit or do not have drinks that contain caffeine or alcohol. Prevent constipation. Eat a variety of high-fiber foods. Good examples are high-fiber cereals, beans, vegetables, and whole-grain breads. Walking is the best way to trigger your intestines to have a bowel movement. Exercise regularly and maintain a healthy weight. Weight loss and exercise will decrease pressure on your bladder and help you control your leakage. Use a catheter as directed  to help empty your bladder.  A catheter is a tiny, plastic tube that is put into your bladder to drain your urine. Go to behavior therapy as directed. Behavior therapy may be used to help you learn to control your urge to urinate. Weight Management   Why it is important to manage your weight:  Being overweight increases your risk of health conditions such as heart disease, high blood pressure, type 2 diabetes, and certain types of cancer. It can also increase your risk for osteoarthritis, sleep apnea, and other respiratory problems. Aim for a slow, steady weight loss. Even a small amount of weight loss can lower your risk of health problems. How to lose weight safely:  A safe and healthy way to lose weight is to eat fewer calories and get regular exercise. You can lose up about 1 pound a week by decreasing the number of calories you eat by 500 calories each day. Healthy meal plan for weight management:  A healthy meal plan includes a variety of foods, contains fewer calories, and helps you stay healthy. A healthy meal plan includes the following:  Eat whole-grain foods more often. A healthy meal plan should contain fiber. Fiber is the part of grains, fruits, and vegetables that is not broken down by your body. Whole-grain foods are healthy and provide extra fiber in your diet. Some examples of whole-grain foods are whole-wheat breads and pastas, oatmeal, brown rice, and bulgur. Eat a variety of vegetables every day. Include dark, leafy greens such as spinach, kale, arslan greens, and mustard greens. Eat yellow and orange vegetables such as carrots, sweet potatoes, and winter squash. Eat a variety of fruits every day. Choose fresh or canned fruit (canned in its own juice or light syrup) instead of juice. Fruit juice has very little or no fiber. Eat low-fat dairy foods. Drink fat-free (skim) milk or 1% milk. Eat fat-free yogurt and low-fat cottage cheese. Try low-fat cheeses such as mozzarella and other reduced-fat cheeses.   Choose meat and other protein foods that are low in fat. Choose beans or other legumes such as split peas or lentils. Choose fish, skinless poultry (chicken or turkey), or lean cuts of red meat (beef or pork). Before you cook meat or poultry, cut off any visible fat. Use less fat and oil. Try baking foods instead of frying them. Add less fat, such as margarine, sour cream, regular salad dressing and mayonnaise to foods. Eat fewer high-fat foods. Some examples of high-fat foods include french fries, doughnuts, ice cream, and cakes. Eat fewer sweets. Limit foods and drinks that are high in sugar. This includes candy, cookies, regular soda, and sweetened drinks. Exercise:  Exercise at least 30 minutes per day on most days of the week. Some examples of exercise include walking, biking, dancing, and swimming. You can also fit in more physical activity by taking the stairs instead of the elevator or parking farther away from stores. Ask your healthcare provider about the best exercise plan for you. © Copyright [employer] 2018 Information is for End User's use only and may not be sold, redistributed or otherwise used for commercial purposes.  All illustrations and images included in CareNotes® are the copyrighted property of [employer] or  [employer]

## 2023-11-02 NOTE — PROGRESS NOTES
Assessment and Plan:     Problem List Items Addressed This Visit    None  Visit Diagnoses     Medicare annual wellness visit, subsequent    -  Primary    Encounter for immunization        Relevant Orders    FLUZONE HIGH-DOSE: influenza vaccine, high-dose, preservative-free 0.7 mL (Completed)    LLQ pain        Relevant Orders    CT abdomen pelvis w contrast    Comprehensive metabolic panel    CBC and differential    UA w Reflex to Microscopic w Reflex to Culture -Lab Collect    Hypothyroidism, unspecified type        Relevant Orders    TSH, 3rd generation with Free T4 reflex    Benign essential hypertension          Flu vaccine given today  Left lower quadrant pain significant on exam concerning for diverticulitis recommend a CAT scan to be performed  Hypertension currently well controlled continue medications as prescribed  Hypothyroidism continue on 50 mcg daily          Depression Screening and Follow-up Plan: Patient was screened for depression during today's encounter. They screened negative with a PHQ-2 score of 0. Preventive health issues were discussed with patient, and age appropriate screening tests were ordered as noted in patient's After Visit Summary. Personalized health advice and appropriate referrals for health education or preventive services given if needed, as noted in patient's After Visit Summary. History of Present Illness:     Patient presents for a Medicare Wellness Visit. Overall patient has been taking her medications compliantly for her cholesterol and thyroid states that she has not had any concerns.   A-fib has been well controlled by her cardiologist.  Patient states sometimes she does have abdominal pain at times denies any diarrhea or constipation at this time    HPI   Patient Care Team:  Clary Goodell, MD as PCP - General (Family Medicine)  Bailey Key MD (Nephrology)     Review of Systems:     Review of Systems   Constitutional: Negative for activity change, appetite change, chills, fatigue and fever. HENT:  Negative for congestion. Respiratory:  Negative for cough, chest tightness and shortness of breath. Cardiovascular:  Negative for chest pain and leg swelling. Gastrointestinal:  Positive for abdominal pain. Negative for abdominal distention, constipation, diarrhea, nausea and vomiting. All other systems reviewed and are negative.        Problem List:     Patient Active Problem List   Diagnosis   • Sarcoidosis   • Centrilobular emphysema (HCC)   • Shortness of breath   • Obstructive sleep apnea   • Benign hypertension with chronic kidney disease, stage III (HCC)   • Eczema   • Hypoxia, sleep related   • Ischemic cardiomyopathy   • Left bundle branch hemiblock   • Lumbar disc disorder with myelopathy   • Lymphadenopathy, mediastinal   • Class 3 severe obesity due to excess calories with serious comorbidity and body mass index (BMI) of 40.0 to 44.9 in [facility])   • Pulmonary hypertension (HCC)   • Pulmonary nodule seen on imaging study   • Snoring   • Emphysema lung (HCC)   • Atrial fibrillation (HCC)   • Persistent atrial fibrillation (HCC)   • Postnasal drip   • Wheeze   • Sebaceous cyst   • Current use of long term anticoagulation   • Neck pain   • Encounter for surgical follow-up care   • Dermoid cyst of skin of nose   • Infection of nose   • Stage 3a chronic kidney disease (HCC)   • Abnormal CT of the chest   • MADDY (acute kidney injury) ([address])   • Acquired hypothyroidism   • Vitamin D deficiency   • Hyperkalemia   • Hyponatremia   • Hypercalcemia      Past Medical and Surgical History:     Past Medical History:   Diagnosis Date   • Coronary artery disease    • 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HCC)     bullous   • Hypertension    • Obesity    • Sarcoidosis    • SOB (shortness of breath)      Past Surgical History:   Procedure Laterality Date   • CARDIAC SURGERY  06/02/2017   • CATARACT EXTRACTION Left 07/28/2019   • CYST REMOVAL Lipoma   • DILATION AND CURETTAGE OF UTERUS     • EYE SURGERY     • Gerjoceline Pigtrista / Haskamilah Rebecca / REMOVE PACEMAKER  2017   • IR BIOPSY LYMPH NODE  2023   • OVARIAN CYST REMOVAL        Family History:     Family History   Problem Relation Age of Onset   • Emphysema Father    • Cancer Sister         ovarian   • Heart attack Brother    • Heart disease Family       Social History:     Social History     Socioeconomic History   • Marital status:       Spouse name: None   • Number of children: None   • Years of education: None   • Highest education level: None   Occupational History   • None   Tobacco Use   • Smoking status: Former     Packs/day: 1.50     Years: 31.00     Total pack years: 46.50     Types: Cigarettes     Start date:      Quit date:      Years since quittin.8   • Smokeless tobacco: Never   Vaping Use   • Vaping Use: Never used   Substance and Sexual Activity   • Alcohol use: Yes     Comment: rare   • Drug use: No   • Sexual activity: None   Other Topics Concern   • None   Social History Narrative   • None     Social Determinants of Health     Financial Resource Strain: Not on file   Food Insecurity: Not on file   Transportation Needs: Not on file   Physical Activity: Not on file   Stress: Not on file   Social Connections: Not on file   Intimate Partner Violence: Not on file   Housing Stability: Not on file      Medications and Allergies:     Current Outpatient Medications   Medication Sig Dispense Refill   • apixaban (ELIQUIS) 5 mg Take 5 mg by mouth 2 (two) times a day 2022 stopped on 2022 for Procedure     • atorvastatin (LIPITOR) 10 mg tablet take 1 tablet by mouth once daily 90 tablet 2   • cholecalciferol (VITAMIN D3) 1,000 units tablet Take 2,000 Units by mouth daily     • Cyanocobalamin (Vitamin B 12) 100 MCG LOZG Take 5,000 mcg by mouth in the morning     • Entresto 49-51 MG TABS Take 1 tablet by mouth 2 (two) times a day     • glucosamine-chondroitin 500-400 MG tablet Take 1 tablet by mouth daily      • levothyroxine 50 mcg tablet take 1 tablet by mouth once daily 90 tablet 2   • metoprolol succinate (TOPROL-XL) 50 mg 24 hr tablet take 1 tablet by mouth once daily 90 tablet 2   • Multiple Vitamins-Minerals (HAIR SKIN AND NAILS FORMULA PO) Take by mouth     • spironolactone (ALDACTONE) 25 mg tablet Take 25 mg by mouth daily     • torsemide (DEMADEX) 20 mg tablet Take 20 mg by mouth daily     • amoxicillin (AMOXIL) 500 mg capsule take 1 capsule by mouth three times a day for 7 days (Patient not taking: Reported on 9/14/2023)     • Cholecalciferol ([facility] VITAMIN D3 PO) Take by mouth (Patient not taking: Reported on 11/2/2023)     • mupirocin (BACTROBAN) 2 % ointment Apply topically 3 (three) times a day (Patient not taking: Reported on 7/11/2023) 22 g 0     No current facility-administered medications for this visit. No Known Allergies   Immunizations:     Immunization History   Administered Date(s) Administered   • COVID-19 MODERNA VACC 0.5 ML IM 02/25/2021, 03/26/2021   • Influenza Split High Dose Preservative Free IM 10/01/2014   • Influenza, high dose seasonal 0.7 mL 12/13/2021, 11/02/2022, 11/02/2023   • Influenza, recombinant, quadrivalent,injectable, preservative free 10/09/2019   • Influenza, seasonal, injectable 09/10/2013   • Pneumococcal 10/09/2018   • Pneumococcal Conjugate 13-Valent 10/09/2017   • Pneumococcal Conjugate PCV 7 10/09/2018   • Pneumococcal Polysaccharide PPV23 10/01/2014   • TD (adult) Preservative Free 10/14/2019   • Tuberculin Skin Test 06/15/2017, 06/22/2017      Health Maintenance:         Topic Date Due   • Hepatitis C Screening  Never done   • Breast Cancer Screening: Mammogram  Never done   • Colorectal Cancer Screening  Discontinued     There are no preventive care reminders to display for this patient. Medicare Screening Tests and Risk Assessments: Cr Etienne is here for her Subsequent Wellness visit.      Health Risk Assessment:   Patient rates overall health as fair. Patient feels that their physical health rating is slightly worse. Patient is satisfied with their life. Eyesight was rated as slightly worse. Hearing was rated as same. Patient feels that their emotional and mental health rating is slightly better. Patients states they are never, rarely angry. Patient states they are often unusually tired/fatigued. Pain experienced in the last 7 days has been some. Patient's pain rating has been 4/10. Patient states that she has experienced no weight loss or gain in last 6 months. Depression Screening:   PHQ-2 Score: 0      Fall Risk Screening: In the past year, patient has experienced: no history of falling in past year      Home Safety:  Patient has trouble with stairs inside or outside of their home. Patient has working smoke alarms and has working carbon monoxide detector. Home safety hazards include: none. Nutrition:   Current diet is Regular. Medications:   Patient is currently taking over-the-counter supplements. OTC medications include: see medication list. Patient is able to manage medications. Activities of Daily Living (ADLs)/Instrumental Activities of Daily Living (IADLs):   Walk and transfer into and out of bed and chair?: Yes  Dress and groom yourself?: Yes    Bathe or shower yourself?: Yes    Feed yourself? Yes  Do your laundry/housekeeping?: Yes  Manage your money, pay your bills and track your expenses?: Yes  Make your own meals?: Yes    Do your own shopping?: Yes    Previous Hospitalizations:   Any hospitalizations or ED visits within the last 12 months?: No      Advance Care Planning:   Living will: Yes    Durable POA for healthcare:  Yes    Advanced directive: Yes      Cognitive Screening:   Provider or family/friend/caregiver concerned regarding cognition?: No    PREVENTIVE SCREENINGS      Cardiovascular Screening:    General: Screening Not Indicated and History Lipid Disorder      Diabetes Screening:     General: Screening Current      Colorectal Cancer Screening:     General: Screening Current      Breast Cancer Screening:     General: Screening Not Indicated      Cervical Cancer Screening:    General: Screening Not Indicated      Osteoporosis Screening:    General: Screening Current      Abdominal Aortic Aneurysm (AAA) Screening:        General: Screening Not Indicated      Lung Cancer Screening:     General: Screening Not Indicated      Hepatitis C Screening:    General: Screening Current    Screening, Brief Intervention, and Referral to Treatment (SBIRT)    Screening  Typical number of drinks in a day: 0  Typical number of drinks in a week: 0  Interpretation: Low risk drinking behavior. Single Item Drug Screening:  How often have you used an illegal drug (including marijuana) or a prescription medication for non-medical reasons in the past year? never    Single Item Drug Screen Score: 0  Interpretation: Negative screen for possible drug use disorder    No results found. Physical Exam:     /70 (BP Location: Left arm, Patient Position: Sitting, Cuff Size: Adult)   Pulse 66   Temp (!) 97.3 °F (36.3 °C) (Temporal)   Resp 16   Ht 4' 10" (1.473 m)   Wt 95.7 kg (211 lb)   SpO2 92%   BMI 44.10 kg/m²     Physical Exam  Vitals reviewed. Constitutional:       Appearance: Normal appearance. She is well-developed. HENT:      Head: Normocephalic and atraumatic. Right Ear: Tympanic membrane, ear canal and external ear normal. There is no impacted cerumen. Left Ear: Tympanic membrane, ear canal and external ear normal. There is no impacted cerumen. Nose: Nose normal.      Mouth/Throat:      Mouth: Mucous membranes are moist.      Pharynx: Oropharynx is clear. Eyes:      Conjunctiva/sclera: Conjunctivae normal.      Pupils: Pupils are equal, round, and reactive to light. Cardiovascular:      Rate and Rhythm: Normal rate and regular rhythm. Heart sounds: Normal heart sounds.    Pulmonary: Effort: Pulmonary effort is normal.      Breath sounds: Normal breath sounds. Abdominal:      General: Abdomen is flat. Bowel sounds are normal.      Palpations: Abdomen is soft. Tenderness: There is abdominal tenderness (llq). Musculoskeletal:         General: Normal range of motion. Cervical back: Normal range of motion and neck supple. Skin:     General: Skin is warm. Capillary Refill: Capillary refill takes less than 2 seconds. Neurological:      General: No focal deficit present. Mental Status: She is alert and oriented to person, place, and time. Mental status is at baseline. Psychiatric:         Mood and Affect: Mood normal.         Behavior: Behavior normal.         Thought Content:  Thought content normal.         Judgment: Judgment normal.          Marlon Mcbride MD

## 2023-11-13 ENCOUNTER — LAB (OUTPATIENT)
Dept: LAB | Facility: CLINIC | Age: 78
End: 2023-11-13
Payer: COMMERCIAL

## 2023-11-13 DIAGNOSIS — N18.31 STAGE 3A CHRONIC KIDNEY DISEASE (HCC): ICD-10-CM

## 2023-11-13 DIAGNOSIS — E83.52 HYPERCALCEMIA: ICD-10-CM

## 2023-11-13 DIAGNOSIS — E03.9 HYPOTHYROIDISM, UNSPECIFIED TYPE: ICD-10-CM

## 2023-11-13 DIAGNOSIS — R10.32 LLQ PAIN: ICD-10-CM

## 2023-11-13 LAB
ALBUMIN SERPL BCP-MCNC: 4.1 G/DL (ref 3.5–5)
ALP SERPL-CCNC: 51 U/L (ref 34–104)
ALT SERPL W P-5'-P-CCNC: 15 U/L (ref 7–52)
ANION GAP SERPL CALCULATED.3IONS-SCNC: 16 MMOL/L
AST SERPL W P-5'-P-CCNC: 23 U/L (ref 13–39)
BACTERIA UR QL AUTO: ABNORMAL /HPF
BASOPHILS # BLD AUTO: 0.03 THOUSANDS/ÂΜL (ref 0–0.1)
BASOPHILS NFR BLD AUTO: 0 % (ref 0–1)
BILIRUB SERPL-MCNC: 0.59 MG/DL (ref 0.2–1)
BILIRUB UR QL STRIP: NEGATIVE
BUN SERPL-MCNC: 33 MG/DL (ref 5–25)
CALCIUM SERPL-MCNC: 10.5 MG/DL (ref 8.4–10.2)
CHLORIDE SERPL-SCNC: 97 MMOL/L (ref 96–108)
CLARITY UR: CLEAR
CO2 SERPL-SCNC: 24 MMOL/L (ref 21–32)
COLOR UR: YELLOW
CREAT SERPL-MCNC: 1.74 MG/DL (ref 0.6–1.3)
EOSINOPHIL # BLD AUTO: 0.2 THOUSAND/ÂΜL (ref 0–0.61)
EOSINOPHIL NFR BLD AUTO: 3 % (ref 0–6)
ERYTHROCYTE [DISTWIDTH] IN BLOOD BY AUTOMATED COUNT: 14.6 % (ref 11.6–15.1)
GFR SERPL CREATININE-BSD FRML MDRD: 27 ML/MIN/1.73SQ M
GLUCOSE P FAST SERPL-MCNC: 95 MG/DL (ref 65–99)
GLUCOSE UR STRIP-MCNC: NEGATIVE MG/DL
HCT VFR BLD AUTO: 41.2 % (ref 34.8–46.1)
HGB BLD-MCNC: 13.5 G/DL (ref 11.5–15.4)
HGB UR QL STRIP.AUTO: NEGATIVE
HYALINE CASTS #/AREA URNS LPF: ABNORMAL /LPF
IMM GRANULOCYTES # BLD AUTO: 0.02 THOUSAND/UL (ref 0–0.2)
IMM GRANULOCYTES NFR BLD AUTO: 0 % (ref 0–2)
KETONES UR STRIP-MCNC: NEGATIVE MG/DL
LEUKOCYTE ESTERASE UR QL STRIP: NEGATIVE
LYMPHOCYTES # BLD AUTO: 0.48 THOUSANDS/ÂΜL (ref 0.6–4.47)
LYMPHOCYTES NFR BLD AUTO: 7 % (ref 14–44)
MCH RBC QN AUTO: 34 PG (ref 26.8–34.3)
MCHC RBC AUTO-ENTMCNC: 32.8 G/DL (ref 31.4–37.4)
MCV RBC AUTO: 104 FL (ref 82–98)
MONOCYTES # BLD AUTO: 0.74 THOUSAND/ÂΜL (ref 0.17–1.22)
MONOCYTES NFR BLD AUTO: 10 % (ref 4–12)
NEUTROPHILS # BLD AUTO: 5.84 THOUSANDS/ÂΜL (ref 1.85–7.62)
NEUTS SEG NFR BLD AUTO: 80 % (ref 43–75)
NITRITE UR QL STRIP: NEGATIVE
NON-SQ EPI CELLS URNS QL MICRO: ABNORMAL /HPF
NRBC BLD AUTO-RTO: 0 /100 WBCS
PH UR STRIP.AUTO: 6.5 [PH]
PLATELET # BLD AUTO: 243 THOUSANDS/UL (ref 149–390)
PMV BLD AUTO: 10.5 FL (ref 8.9–12.7)
POTASSIUM SERPL-SCNC: 4 MMOL/L (ref 3.5–5.3)
PROT SERPL-MCNC: 7.2 G/DL (ref 6.4–8.4)
PROT UR STRIP-MCNC: ABNORMAL MG/DL
RBC # BLD AUTO: 3.97 MILLION/UL (ref 3.81–5.12)
RBC #/AREA URNS AUTO: ABNORMAL /HPF
SODIUM SERPL-SCNC: 137 MMOL/L (ref 135–147)
SP GR UR STRIP.AUTO: 1.02 (ref 1–1.03)
TSH SERPL DL<=0.05 MIU/L-ACNC: 2.4 UIU/ML (ref 0.45–4.5)
UROBILINOGEN UR STRIP-ACNC: <2 MG/DL
WBC # BLD AUTO: 7.31 THOUSAND/UL (ref 4.31–10.16)
WBC #/AREA URNS AUTO: ABNORMAL /HPF

## 2023-11-13 PROCEDURE — 85025 COMPLETE CBC W/AUTO DIFF WBC: CPT

## 2023-11-13 PROCEDURE — 80053 COMPREHEN METABOLIC PANEL: CPT

## 2023-11-13 PROCEDURE — 84443 ASSAY THYROID STIM HORMONE: CPT

## 2023-11-13 PROCEDURE — 81001 URINALYSIS AUTO W/SCOPE: CPT

## 2023-11-13 PROCEDURE — 36415 COLL VENOUS BLD VENIPUNCTURE: CPT

## 2023-11-14 DIAGNOSIS — E83.52 HYPERCALCEMIA: Primary | ICD-10-CM

## 2023-11-14 NOTE — RESULT ENCOUNTER NOTE
Labs reviewed. Please call patient and let her know that renal function relatively stable. Calcium remains elevated. Please have patient stop taking vitamin D supplements. Inquire if patient is taking any calcium supplements. Repeat BMP in 2 weeks. Orders in chart.

## 2023-11-15 ENCOUNTER — TELEPHONE (OUTPATIENT)
Dept: FAMILY MEDICINE CLINIC | Facility: CLINIC | Age: 78
End: 2023-11-15

## 2023-11-15 ENCOUNTER — TELEPHONE (OUTPATIENT)
Dept: NEPHROLOGY | Facility: CLINIC | Age: 78
End: 2023-11-15

## 2023-11-15 NOTE — TELEPHONE ENCOUNTER
----- Message from Yoli Cabrera MD sent at 11/15/2023  8:55 AM EST -----  Please advise the patient I was CCed in her results from the nephrology team and I agree with their assessment that the labs are all stable except for the calcium I would like her to stop the calcium supplements/and vitamin D supplements and repeat t  he blood work in 2 weeks likely suggested

## 2023-11-15 NOTE — TELEPHONE ENCOUNTER
Pt advised that labs show relatively stable kidney function. Calcium remains elevated. Pt was taking calcium 1000 mg with D but was told by PCP to decrease it by half. She has not taken any for the past week. She is to avoid both calcium and Vitamin D? Repeat labs in two weeks per Deborah Yeager.    ----- Message from Lan Leblanc PA-C sent at 11/14/2023  3:38 PM EST -----  Labs reviewed. Please call patient and let her know that renal function relatively stable. Calcium remains elevated. Please have patient stop taking vitamin D supplements. Inquire if patient is taking any calcium supplements. Repeat BMP in 2 weeks. Orders in chart.

## 2023-11-16 ENCOUNTER — HOSPITAL ENCOUNTER (OUTPATIENT)
Dept: RADIOLOGY | Facility: HOSPITAL | Age: 78
Discharge: HOME | End: 2023-11-16
Attending: FAMILY MEDICINE
Payer: COMMERCIAL

## 2023-11-16 DIAGNOSIS — R10.32 LLQ PAIN: ICD-10-CM

## 2023-11-16 PROCEDURE — 74176 CT ABD & PELVIS W/O CONTRAST: CPT

## 2023-11-16 PROCEDURE — G1004 CDSM NDSC: HCPCS

## 2023-11-30 ENCOUNTER — TELEPHONE (OUTPATIENT)
Dept: NEPHROLOGY | Facility: CLINIC | Age: 78
End: 2023-11-30

## 2023-11-30 NOTE — TELEPHONE ENCOUNTER
LM for pt offering sooner appt with Dr. Kacy Rodriguez 12/6 at 3:00 or 12/7 at 1:30 in [place]. Or with [facility] 12/5 in [address] [place] [address] E.  Would need BMP

## 2023-12-01 ENCOUNTER — TELEPHONE (OUTPATIENT)
Dept: NEPHROLOGY | Facility: CLINIC | Age: 78
End: 2023-12-01

## 2023-12-01 ENCOUNTER — LAB (OUTPATIENT)
Dept: LAB | Facility: CLINIC | Age: 78
End: 2023-12-01
Payer: COMMERCIAL

## 2023-12-01 DIAGNOSIS — E83.52 HYPERCALCEMIA: ICD-10-CM

## 2023-12-01 LAB
ANION GAP SERPL CALCULATED.3IONS-SCNC: 11 MMOL/L
BUN SERPL-MCNC: 41 MG/DL (ref 5–25)
CALCIUM SERPL-MCNC: 9.7 MG/DL (ref 8.4–10.2)
CHLORIDE SERPL-SCNC: 98 MMOL/L (ref 96–108)
CO2 SERPL-SCNC: 29 MMOL/L (ref 21–32)
CREAT SERPL-MCNC: 1.64 MG/DL (ref 0.6–1.3)
GFR SERPL CREATININE-BSD FRML MDRD: 29 ML/MIN/1.73SQ M
GLUCOSE SERPL-MCNC: 87 MG/DL (ref 65–140)
POTASSIUM SERPL-SCNC: 3.8 MMOL/L (ref 3.5–5.3)
SODIUM SERPL-SCNC: 138 MMOL/L (ref 135–147)

## 2023-12-01 PROCEDURE — 80048 BASIC METABOLIC PNL TOTAL CA: CPT

## 2023-12-01 PROCEDURE — 36415 COLL VENOUS BLD VENIPUNCTURE: CPT

## 2023-12-01 NOTE — TELEPHONE ENCOUNTER
----- Message from Lesley Wolf PA-C sent at 12/1/2023  3:54 PM EST -----  Labs reviewed. Please call pt and let her know labs are stable and calcium levels improved.

## 2023-12-08 DIAGNOSIS — K80.21 CALCULUS OF GALLBLADDER WITH BILIARY OBSTRUCTION BUT WITHOUT CHOLECYSTITIS: ICD-10-CM

## 2023-12-08 DIAGNOSIS — M79.3 PANNICULITIS: Primary | ICD-10-CM

## 2023-12-08 RX ORDER — CIPROFLOXACIN 500 MG/1
500 TABLET, FILM COATED ORAL EVERY 12 HOURS SCHEDULED
Qty: 14 TABLET | Refills: 0 | Status: SHIPPED | OUTPATIENT
Start: 2023-12-08 | End: 2023-12-15

## 2023-12-12 ENCOUNTER — TELEPHONE (OUTPATIENT)
Age: 78
End: 2023-12-12

## 2023-12-12 NOTE — TELEPHONE ENCOUNTER
The patient called she is going to the dentis tomorrow for an implant the dentist  put her on an antibiotic for 7 days   ---she started it today amoxicillin 500   3 today   4 tomorrow  and 3 a day for 7 days   since the patient is on an antibiotic from the dentist she does not want to take the antibiotic Dr Lucy Francis prescribed    please advise thank you

## 2023-12-12 NOTE — TELEPHONE ENCOUNTER
OK with her not taking it. But did the GI doctor team schedule her an appointment to talk about CT scan.  If not please help call GI to get them involved on her case

## 2024-01-02 RX ORDER — CHLORHEXIDINE GLUCONATE ORAL RINSE 1.2 MG/ML
SOLUTION DENTAL
COMMUNITY
Start: 2023-11-28

## 2024-01-03 ENCOUNTER — CONSULT (OUTPATIENT)
Dept: GASTROENTEROLOGY | Facility: CLINIC | Age: 79
End: 2024-01-03
Payer: COMMERCIAL

## 2024-01-03 VITALS
BODY MASS INDEX: 43.45 KG/M2 | SYSTOLIC BLOOD PRESSURE: 128 MMHG | HEART RATE: 78 BPM | OXYGEN SATURATION: 97 % | WEIGHT: 207 LBS | HEIGHT: 58 IN | DIASTOLIC BLOOD PRESSURE: 82 MMHG

## 2024-01-03 DIAGNOSIS — R19.8 CHANGE IN BOWEL MOVEMENT: Primary | ICD-10-CM

## 2024-01-03 DIAGNOSIS — E66.01 CLASS 3 SEVERE OBESITY DUE TO EXCESS CALORIES WITH SERIOUS COMORBIDITY AND BODY MASS INDEX (BMI) OF 40.0 TO 44.9 IN ADULT (HCC): ICD-10-CM

## 2024-01-03 DIAGNOSIS — K60.2 ANAL FISSURE: ICD-10-CM

## 2024-01-03 DIAGNOSIS — M79.3 PANNICULITIS: ICD-10-CM

## 2024-01-03 DIAGNOSIS — K64.4 EXTERNAL HEMORRHOID: ICD-10-CM

## 2024-01-03 PROCEDURE — 99204 OFFICE O/P NEW MOD 45 MIN: CPT | Performed by: INTERNAL MEDICINE

## 2024-01-03 RX ORDER — HYDROCORTISONE 25 MG/G
CREAM TOPICAL 2 TIMES DAILY
Qty: 30 G | Refills: 1 | Status: SHIPPED | OUTPATIENT
Start: 2024-01-03

## 2024-01-03 NOTE — PROGRESS NOTES
Consultation -  [facility]  Dianelys Kim 78 y.o. female MRN: [id number]          Assessment & Plan:  78-year-old female with multiple medical problems, on anticoagulation, history of cardiomyopathy which has recovered, has a defibrillator/pacemaker, COPD/emphysema, sleep apnea, not compliant with CPAP, with recent change in stools with loose stools, rectal bleeding, more recent significant anorectal pain.  And change in bowel movement after antibiotics.    1.  Rectal pain: Most likely secondary to anal fissure and hemorrhoids  -Given the patient's multimedical problems reluctant to use nitroglycerin ointment at this time  Will start with topical Anusol cream  -If not improved we will try nitroglycerin ointment  -Continue fiber supplementation which the patient reports can bind her up at times    2.  Change in bowel movements with loose stools and rectal bleeding:  -GI malignancy, inflammatory bowel disease are on the differential, especially in light of recent antibiotic use C. difficile infection is also on the differential  -Will check stool studies  -Schedule patient for colonoscopy   -Dulcolax MiraLAX bowel prep  -Discussed risks with the patient  -Will schedule the patient for procedure in the hospital  -Will need to clearance    Dianelys was seen today for rectal bleeding, rectal pain, diarrhea and panniculitis.    Diagnoses and all orders for this visit:    Change in bowel movement  -     Calprotectin,Fecal; Future  -     Clostridium difficile toxin by PCR with EIA; Future  -     Stool Enteric Bacterial Panel by PCR; Future  -     Ova and parasite examination; Future  -     hydrocortisone (ANUSOL-HC) 2.5 % rectal cream; Apply topically 2 (two) times a day    Panniculitis  -     Ambulatory Referral to Gastroenterology    Anal fissure    External hemorrhoid  -     Colonoscopy; Future    Class 3 severe obesity due to excess calories with serious comorbidity and body mass index (BMI) of 40.0 to  44.9 in adult (HCC)  -     Colonoscopy; Future    Other orders  -     Diet NPO; Sips with meds; Standing  -     Void on call to OR; Standing            _____________________________________________________________        CC: Continue bowel movements, rectal bleeding and pressure    HPI:  Dianelys Kim is a 78 y.o.female who was referred for evaluation of change in bowel moods with rectal bleeding pressure.  This is a 78-year-old female, COPD/emphysema, not using her oxygen, has a history of sleep apnea, not using CPAP, has a history of cardiomyopathy, per last cardiology note her EF appears to have recovered from 30 to 50%, she has an AICD/pacemaker, on anticoagulation.  Reports that over the last 1 year she has had change in bowel movements with intermittent bouts of loose stools, intermittent rectal bleeding usually when wiping.  More recently she had some lower abdominal pain, had a CAT scan which noted cholelithiasis, nephrolithiasis, she was placed on antibiotics due to lymphadenopathy, mesenteric inflammation.  In fact before that she had antibiotics for dental procedure with amoxicillin, then ciprofloxacin as noted above.  Today she reports having increasing stools, loose watery stools of a mayonnaise consistency mixed with blood with rectal pain and pressure with each bowel movement feeling like a knife tear.    Last colonoscopy was many years ago.    Medical history is notable above.    Surgical history is notable for CABG, oophorectomy, hysterectomy.    Quit smoking, rarely drinks, retired .    Family history is negative for GI associated malignancies.            ROS:  The remainder of the ROS was negative except for the pertinent positives mentioned in HPI.         Allergies: Patient has no known allergies.    Medications:   Current Outpatient Medications:     Acetaminophen 80 MG TBDP, Take by mouth if needed, Disp: , Rfl:     apixaban (ELIQUIS) 5 mg, Take 5 mg by mouth 2 (two) times a day  6/2/2022 stopped on 5/31/2022 for Procedure, Disp: , Rfl:     aspirin (ECOTRIN LOW STRENGTH) 81 mg EC tablet, Take 81 mg by mouth if needed, Disp: , Rfl:     atorvastatin (LIPITOR) 10 mg tablet, take 1 tablet by mouth once daily, Disp: 90 tablet, Rfl: 2    Cyanocobalamin (Vitamin B 12) 100 MCG LOZG, Take 5,000 mcg by mouth in the morning, Disp: , Rfl:     Entresto 49-51 MG TABS, Take 1 tablet by mouth 2 (two) times a day, Disp: , Rfl:     glucosamine-chondroitin 500-400 MG tablet, Take 1 tablet by mouth daily , Disp: , Rfl:     hydrocortisone (ANUSOL-HC) 2.5 % rectal cream, Apply topically 2 (two) times a day, Disp: 30 g, Rfl: 1    levothyroxine 50 mcg tablet, take 1 tablet by mouth once daily, Disp: 90 tablet, Rfl: 2    metoprolol succinate (TOPROL-XL) 50 mg 24 hr tablet, take 1 tablet by mouth once daily, Disp: 90 tablet, Rfl: 2    Multiple Vitamins-Minerals (HAIR SKIN AND NAILS FORMULA PO), Take by mouth, Disp: , Rfl:     spironolactone (ALDACTONE) 25 mg tablet, Take 25 mg by mouth daily, Disp: , Rfl:     torsemide (DEMADEX) 20 mg tablet, Take 20 mg by mouth daily, Disp: , Rfl:     amoxicillin (AMOXIL) 500 mg capsule, take 1 capsule by mouth three times a day for 7 days (Patient not taking: Reported on 9/14/2023), Disp: , Rfl:     chlorhexidine (PERIDEX) 0.12 % solution, USE AS DIRECTED STARTING 1 DAY BEFORE SURGERY (Patient not taking: Reported on 1/3/2024), Disp: , Rfl:     mupirocin (BACTROBAN) 2 % ointment, Apply topically 3 (three) times a day (Patient not taking: Reported on 7/11/2023), Disp: 22 g, Rfl: 0'    Past Medical History:   Diagnosis Date    Coronary artery disease     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HCC)     bullous    Hypertension     Obesity     Sarcoidosis     SOB (shortness of breath)        Past Surgical History:   Procedure Laterality Date    CARDIAC SURGERY  06/02/2017    CATARACT EXTRACTION Left 07/28/2019    CYST REMOVAL      Lipoma    DENTAL SURGERY  12/2023    DILATION AND CURETTAGE OF UTERUS       "EYE SURGERY      INSERT / REPLACE / REMOVE PACEMAKER  12/20/2017    IR BIOPSY LYMPH NODE  08/11/2023    OVARIAN CYST REMOVAL         Family History   Problem Relation Age of Onset    Emphysema Father     Cancer Sister         ovarian    Heart attack Brother     Heart disease Family         reports that she quit smoking about 37 years ago. Her smoking use included cigarettes. She started smoking about 68 years ago. She has a 46.5 pack-year smoking history. She has never used smokeless tobacco. She reports that she does not currently use alcohol. She reports that she does not use drugs.          Physical Exam:     /82 (BP Location: Left arm, Patient Position: Sitting, Cuff Size: Standard)   Pulse 78   Ht 4' 10\" (1.473 m)   Wt 93.9 kg (207 lb)   SpO2 97%   BMI 43.26 kg/m²     Gen: wn/wd, NAD, obese, appears dyspneic with ambulation  HEENT: anicteric, MMM, no cervical LAD  CVS: RRR, no m/r/g  CHEST: CTA b/l  ABD: +BS, soft, NT,ND, no hepatosplenomegaly  EXT: no c/c/e  NEURO: aaox3  SKIN: NO rashes  Rectal external hemorrhoids and anterior anal fissure    "

## 2024-01-03 NOTE — PATIENT INSTRUCTIONS
Scheduled date of colonoscopy (as of today): 1/30/24  Physician performing colonoscopy: Dr. Riley  Location of colonoscopy: [facility]  Bowel prep reviewed with patient: [place]ott  Instructions reviewed with patient by: Julianna   Clearances: melvin Elkins [facility]

## 2024-01-08 ENCOUNTER — APPOINTMENT (OUTPATIENT)
Dept: LAB | Facility: CLINIC | Age: 79
End: 2024-01-08
Payer: COMMERCIAL

## 2024-01-08 DIAGNOSIS — R19.8 CHANGE IN BOWEL MOVEMENT: ICD-10-CM

## 2024-01-08 PROCEDURE — 87177 OVA AND PARASITES SMEARS: CPT

## 2024-01-08 PROCEDURE — 83993 ASSAY FOR CALPROTECTIN FECAL: CPT

## 2024-01-08 PROCEDURE — 87493 C DIFF AMPLIFIED PROBE: CPT

## 2024-01-08 PROCEDURE — 87505 NFCT AGENT DETECTION GI: CPT

## 2024-01-08 PROCEDURE — 87209 SMEAR COMPLEX STAIN: CPT

## 2024-01-09 LAB
C DIFF TOX A+B STL QL IA: NEGATIVE
C DIFF TOX GENS STL QL NAA+PROBE: POSITIVE
CAMPYLOBACTER DNA SPEC NAA+PROBE: NORMAL
SALMONELLA DNA SPEC QL NAA+PROBE: NORMAL
SHIGA TOXIN STX GENE SPEC NAA+PROBE: NORMAL
SHIGELLA DNA SPEC QL NAA+PROBE: NORMAL

## 2024-01-11 LAB — CALPROTECTIN STL-MCNT: 58 UG/G (ref 0–120)

## 2024-01-12 ENCOUNTER — TELEPHONE (OUTPATIENT)
Age: 79
End: 2024-01-12

## 2024-01-12 DIAGNOSIS — A04.72 C. DIFFICILE COLITIS: Primary | ICD-10-CM

## 2024-01-12 RX ORDER — VANCOMYCIN HYDROCHLORIDE 125 MG/1
125 CAPSULE ORAL 4 TIMES DAILY
Qty: 40 CAPSULE | Refills: 0 | Status: SHIPPED | OUTPATIENT
Start: 2024-01-12 | End: 2024-01-22

## 2024-01-12 NOTE — TELEPHONE ENCOUNTER
Please call patient and let her know that her stool studies showed abnormal C. difficile testing.  She had a positive/negative result which is inconclusive however since she is having symptoms of loose stools/diarrhea I would recommend treatment.  She we will start her on vancomycin 4 times daily for 10 days.  She should avoid the use of Imodium as this can worsen infection.  She should practice good hand hygiene.  If she develops any severe fevers, chills, abdominal distention pain, worsening diarrhea or blood in stool she should go to the emergency room.  Thank you    Dr. Rosemary ANSARI

## 2024-01-12 NOTE — TELEPHONE ENCOUNTER
Pt calling to inquire about her stool specimen results? Symptoms are unchanged and says she is still having mucousy stools.

## 2024-01-16 NOTE — TELEPHONE ENCOUNTER
"I spoke with patient and reviewed recommendations. She continues with soft stool at this time. She doesn't feel much change/improvement with the Vanco.     She did note that she purchased large bag of Brussel sprouts 2-3 weeks ago, boiled them and \"ate quite a few\" then her issues started. She states she does experience some rectal bleeding, BRB but nothing different than described at office visit. She is taking Metamucil for fiber. She gets chills periodically and will take \"a Bufferin\". I asked that she monitor her temperatures during these episodes. Patient does clean with bleach products at this time and she does practice hand hygiene. Patient aware to report to ER if needed. She is scheduled for colon 1/30/24 with Dr. Riley. Please advise if any further recommendations.  "

## 2024-01-16 NOTE — TELEPHONE ENCOUNTER
Please call and inform patient that it may take several days until she feels better from the C. difficile.  She should continue her vancomycin until it is completed.  When she completes the vancomycin if her bowels have still not improved GI can always repeat C. difficile studies to see if she has been cleared or not.  It is not unusual to have some blood from the rectal area when you do have a C. difficile infection.  Continue to clean with bleach products and practice good hand hygiene.

## 2024-01-25 NOTE — PROGRESS NOTES
NEPHROLOGY OFFICE VISIT   Dianelys Kim 78 y.o. female MRN: [id number]  1/26/2024    Reason for Visit: Follow up    INTERVAL HISTORY and SUBJECTIVE:    I had the pleasure of seeing Dianelys today in the renal clinic.  She is a 78-year-old female who was initially seen in consultation by Dr. Childers on January 5, 2023.  She had a follow-up visit in July with MARY Gastelum.  She was seen in July it was noted that she sustained an MADDY.  There was concern for obstruction due to symptoms.  Renal ultrasound was obtained which was negative for hydronephrosis.  Recent imaging CT in November 2023 negative for obstruction.  Kidneys unremarkable.  Former smoker.  History of sarcoidosis, hypertension, hypothyroidism, atrial fibrillation and ischemic cardiomyopathy.  Prior CABG    Since last visit:Recently 1/8/2024: C. difficile positive by PCR.  Completed course of antibiotics and symptoms have abated.    Last labs obtained on 12/1/2023    ASSESSMENT AND PLAN:    Chronic kidney disease: Likely stage IIIB  Baseline creatinine appears to be near 1.4 to 1.7 mg/dL.  GFR near 27-31.  Current creatinine 1.64  Prior baseline 0.9-1.2 earlier this year but Cystatin C 2.33 with a GFR of 22 which is more consistent with current renal function.  No obstruction on imaging  Kidneys unremarkable, no echogenicity on imaging  Patient previously discussed use of SGLT2 inhibitor with her primary care physician.    CKD monitoring: Hemoglobin within normal limits as of November 2023    Essential hypertension:  Medications: Aldactone 25 mg daily, torsemide 20 mg daily, metoprolol succinate 50 mg daily  On medication since 2017  Blood pressure acceptable, no changes    Ischemic cardiomyopathy/pulmonary hypertension:  Echocardiogram March 2023: Ejection fraction improved to 55%.  Mild atrial stenosis, moderate mitral insufficiency.  Moderate aortic stenosis  Status post AICD/pacemaker  On Entresto, metoprolol, Eliquis, Lipitor, torsemide,  Aldactone  HERMANN.  Following with pulmonary but patient does not want to pursue CPAP.  Counseled    Sarcoidosis:  Follows with pulmonary, Dr. Isidro  Diagnosed in 2014  Presence of mediastinal and hilar adenopathy  No evidence of interstitial lung disease on PET/CT    Oxygen dependency:  Chronic shortness of breath  Oxygen via nasal cannula 2 to 3 L as needed  On room air.    C. difficile positive:   Seen by Dr. Riley on 1/3/2024 due to loose stools and rectal bleeding.  Completed course of antibiotic    Right carotid bruit: May have been referred turbulence due to aortic stenosis.  PCP messaged for follow-up.    Other: Hypothyroidism on levothyroxine, panniculitis, anal fissure, COPD/emphysema, HERMANN not using CPAP    PATIENT INSTRUCTIONS:    Patient Instructions   You are seen today for continued monitoring of kidney function.  There is evidence that you have chronic kidney disease stage III.  We will need to closely monitor to prevent progression of disease or worsening of kidney function.    Recommendations:  No NSAIDs such as Motrin Aleve ibuprofen and Advil  You can take Tylenol for control of mild pain  Check BMP in 2 weeks  Follow-up in approximately 3 to 4 months  Repeat blood work prior to the next appointment.  No change in medication  Blood pressure control essential to prevent further kidney damage.  Your blood pressure is well-controlled at this time.  Try acidophilus to replenish bowel kimberly      Orders Placed This Encounter   Procedures    Basic metabolic panel     Standing Status:   Future     Standing Expiration Date:   1/26/2025    Albumin / creatinine urine ratio     Standing Status:   Future     Standing Expiration Date:   1/26/2025    PTH, intact     Standing Status:   Future     Standing Expiration Date:   1/26/2025    Renal function panel     Standing Status:   Future     Standing Expiration Date:   1/26/2025    Urinalysis with microscopic     Standing Status:   Future     Standing Expiration  "Date:   1/26/2025    Vitamin D 25 hydroxy     Standing Status:   Future     Standing Expiration Date:   1/26/2025    CBC     Standing Status:   Future     Standing Expiration Date:   1/26/2025    Magnesium     Standing Status:   Future     Standing Expiration Date:   1/26/2025       OBJECTIVE:  Current Weight: Weight - Scale: 93.9 kg (207 lb)  Vitals:    01/26/24 1211 01/26/24 1227   BP:  110/74   BP Location:  Left arm   Patient Position:  Sitting   Cuff Size:  Large   Pulse: 80 68   SpO2: 94%    Weight: 93.9 kg (207 lb)    Height: 4' 10\" (1.473 m)     Body mass index is 43.26 kg/m².      REVIEW OF SYSTEMS:    Review of Systems   Constitutional:  Negative for appetite change, chills, fever and unexpected weight change.   HENT:  Negative for congestion, ear pain and sore throat.    Eyes:  Negative for pain and visual disturbance.   Respiratory:  Negative for cough and shortness of breath.    Cardiovascular:  Positive for leg swelling (Left leg ankle edema- mild). Negative for chest pain and palpitations.   Gastrointestinal:  Positive for diarrhea (.  Recently treated for C. difficile- resolved.). Negative for abdominal pain, constipation, nausea and vomiting.   Genitourinary:  Positive for urgency. Negative for dysuria and hematuria.        Stress leakage   Musculoskeletal:  Positive for arthralgias and gait problem. Negative for back pain.   Skin:  Negative for color change and rash.   Neurological:  Positive for dizziness (Occasional dizziness). Negative for seizures, syncope and weakness.   Hematological:  Does not bruise/bleed easily.   Psychiatric/Behavioral:  The patient is not nervous/anxious.    All other systems reviewed and are negative.      PHYSICAL EXAM:      Physical Exam  Constitutional:       General: She is not in acute distress.     Appearance: She is well-developed. She is obese. She is not ill-appearing, toxic-appearing or diaphoretic.   HENT:      Head: Normocephalic and atraumatic.      Nose: " Nose normal. No congestion.      Mouth/Throat:      Mouth: Mucous membranes are moist.      Pharynx: No oropharyngeal exudate.   Eyes:      General: No scleral icterus.        Right eye: No discharge.         Left eye: No discharge.      Extraocular Movements: Extraocular movements intact.      Conjunctiva/sclera: Conjunctivae normal.      Pupils: Pupils are equal, round, and reactive to light.   Neck:      Thyroid: No thyromegaly.      Vascular: Carotid bruit (Carotid bruit which may be transmitted sound due to murmur) present. No JVD.      Trachea: No tracheal deviation.   Cardiovascular:      Rate and Rhythm: Normal rate and regular rhythm.      Heart sounds: Murmur heard.      No friction rub. No gallop.   Pulmonary:      Effort: Pulmonary effort is normal.      Breath sounds: Normal breath sounds.   Abdominal:      General: Bowel sounds are normal. There is no distension.      Palpations: Abdomen is soft. There is no mass.      Tenderness: There is no abdominal tenderness. There is no guarding or rebound.   Musculoskeletal:         General: No swelling, tenderness or signs of injury. Normal range of motion.      Cervical back: Normal range of motion and neck supple. No rigidity or tenderness.      Right lower leg: No edema.      Left lower leg: No edema (Very mild ankle edema left leg).   Skin:     General: Skin is warm and dry.      Capillary Refill: Capillary refill takes less than 2 seconds.      Coloration: Skin is not jaundiced or pale.      Findings: No erythema or rash.   Neurological:      General: No focal deficit present.      Mental Status: She is alert and oriented to person, place, and time.   Psychiatric:         Mood and Affect: Mood normal.         Behavior: Behavior normal.         Thought Content: Thought content normal.         Judgment: Judgment normal.         Medications:    Current Outpatient Medications:     apixaban (ELIQUIS) 5 mg, Take 5 mg by mouth 2 (two) times a day 6/2/2022 stopped  "on 5/31/2022 for Procedure, Disp: , Rfl:     aspirin (ECOTRIN LOW STRENGTH) 81 mg EC tablet, Take 81 mg by mouth if needed, Disp: , Rfl:     atorvastatin (LIPITOR) 10 mg tablet, take 1 tablet by mouth once daily, Disp: 90 tablet, Rfl: 2    Cyanocobalamin (Vitamin B 12) 100 MCG LOZG, Take 5,000 mcg by mouth in the morning, Disp: , Rfl:     Entresto 49-51 MG TABS, Take 1 tablet by mouth 2 (two) times a day, Disp: , Rfl:     glucosamine-chondroitin 500-400 MG tablet, Take 1 tablet by mouth daily , Disp: , Rfl:     levothyroxine 50 mcg tablet, take 1 tablet by mouth once daily, Disp: 90 tablet, Rfl: 2    metoprolol succinate (TOPROL-XL) 50 mg 24 hr tablet, take 1 tablet by mouth once daily, Disp: 90 tablet, Rfl: 2    Multiple Vitamins-Minerals (HAIR SKIN AND NAILS FORMULA PO), Take by mouth, Disp: , Rfl:     spironolactone (ALDACTONE) 25 mg tablet, Take 25 mg by mouth daily, Disp: , Rfl:     torsemide (DEMADEX) 20 mg tablet, Take 20 mg by mouth daily, Disp: , Rfl:     Acetaminophen 80 MG TBDP, Take by mouth if needed, Disp: , Rfl:     amoxicillin (AMOXIL) 500 mg capsule, take 1 capsule by mouth three times a day for 7 days (Patient not taking: Reported on 9/14/2023), Disp: , Rfl:     chlorhexidine (PERIDEX) 0.12 % solution, USE AS DIRECTED STARTING 1 DAY BEFORE SURGERY (Patient not taking: Reported on 1/3/2024), Disp: , Rfl:     hydrocortisone (ANUSOL-HC) 2.5 % rectal cream, Apply topically 2 (two) times a day, Disp: 30 g, Rfl: 1    mupirocin (BACTROBAN) 2 % ointment, Apply topically 3 (three) times a day (Patient not taking: Reported on 7/11/2023), Disp: 22 g, Rfl: 0    Laboratory Results:        Invalid input(s): \"ALBUMIN\"    Results for orders placed or performed in visit on 01/08/24   Calprotectin,Fecal   Result Value Ref Range    Calprotectin 58 0 - 120 ug/g   Clostridium difficile toxin by PCR with EIA    Specimen: Stool   Result Value Ref Range    C.difficile toxin by PCR Positive (A) Negative    C difficile " Toxins A+B, EIA Negative Negative   Stool Enteric Bacterial Panel by PCR    Specimen: Stool   Result Value Ref Range    Salmonella sp PCR None Detected None Detected    Shigella sp/Enteroinvasive E. coli (EIEC) PCR None Detected None Detected    Campylobacter sp (jejuni and coli) PCR None Detected None Detected    Shiga toxin 1/Shiga toxin 2 genes PCR None Detected None Detected

## 2024-01-26 ENCOUNTER — OFFICE VISIT (OUTPATIENT)
Dept: NEPHROLOGY | Facility: CLINIC | Age: 79
End: 2024-01-26
Payer: COMMERCIAL

## 2024-01-26 ENCOUNTER — TELEPHONE (OUTPATIENT)
Dept: GASTROENTEROLOGY | Facility: CLINIC | Age: 79
End: 2024-01-26

## 2024-01-26 VITALS
BODY MASS INDEX: 43.45 KG/M2 | HEART RATE: 68 BPM | WEIGHT: 207 LBS | OXYGEN SATURATION: 94 % | HEIGHT: 58 IN | DIASTOLIC BLOOD PRESSURE: 74 MMHG | SYSTOLIC BLOOD PRESSURE: 110 MMHG

## 2024-01-26 DIAGNOSIS — N18.30 BENIGN HYPERTENSION WITH CHRONIC KIDNEY DISEASE, STAGE III (HCC): ICD-10-CM

## 2024-01-26 DIAGNOSIS — G47.33 OBSTRUCTIVE SLEEP APNEA: Chronic | ICD-10-CM

## 2024-01-26 DIAGNOSIS — I12.9 BENIGN HYPERTENSION WITH CHRONIC KIDNEY DISEASE, STAGE III (HCC): ICD-10-CM

## 2024-01-26 DIAGNOSIS — N18.32 STAGE 3B CHRONIC KIDNEY DISEASE (HCC): ICD-10-CM

## 2024-01-26 DIAGNOSIS — E55.9 VITAMIN D DEFICIENCY: ICD-10-CM

## 2024-01-26 DIAGNOSIS — I48.19 PERSISTENT ATRIAL FIBRILLATION (HCC): ICD-10-CM

## 2024-01-26 DIAGNOSIS — I25.5 ISCHEMIC CARDIOMYOPATHY: ICD-10-CM

## 2024-01-26 DIAGNOSIS — E87.1 HYPONATREMIA: ICD-10-CM

## 2024-01-26 DIAGNOSIS — D86.9 SARCOIDOSIS: Primary | Chronic | ICD-10-CM

## 2024-01-26 DIAGNOSIS — E83.52 HYPERCALCEMIA: ICD-10-CM

## 2024-01-26 DIAGNOSIS — I27.20 PULMONARY HYPERTENSION (HCC): ICD-10-CM

## 2024-01-26 DIAGNOSIS — N18.4 CHRONIC RENAL DISEASE, STAGE IV (HCC): ICD-10-CM

## 2024-01-26 PROBLEM — I48.91 ATRIAL FIBRILLATION (HCC): Status: RESOLVED | Noted: 2017-10-18 | Resolved: 2024-01-26

## 2024-01-26 PROCEDURE — 99214 OFFICE O/P EST MOD 30 MIN: CPT | Performed by: NURSE PRACTITIONER

## 2024-01-26 NOTE — TELEPHONE ENCOUNTER
Left message for pt that I had to cancel her colonoscopy with Dr. Riley for 1/30 due to the insurance still pending authorization. She was scheduled at  [facility]. Asked patient to call back and reschedule to a later date. Will call her again if she doesn't return call.

## 2024-01-26 NOTE — TELEPHONE ENCOUNTER
Patients GI provider:  Dr. Riley    Number to return call: [phone]    Reason for call: Pt calling to resched colonocsopy. Resched for 03/04/2024. Resched at [facility], please change over to [facility].    Scheduled procedure/appointment date if applicable: Procedure 03/04/2024

## 2024-01-26 NOTE — PATIENT INSTRUCTIONS
You are seen today for continued monitoring of kidney function.  There is evidence that you have chronic kidney disease stage III.  We will need to closely monitor to prevent progression of disease or worsening of kidney function.    Recommendations:  No NSAIDs such as Motrin Aleve ibuprofen and Advil  You can take Tylenol for control of mild pain  Check BMP in 2 weeks  Follow-up in approximately 3 to 4 months  Repeat blood work prior to the next appointment.  No change in medication  Blood pressure control essential to prevent further kidney damage.  Your blood pressure is well-controlled at this time.  Try acidophilus to replenish bowel kimberly

## 2024-01-30 ENCOUNTER — TELEPHONE (OUTPATIENT)
Dept: FAMILY MEDICINE CLINIC | Facility: CLINIC | Age: 79
End: 2024-01-30

## 2024-01-30 NOTE — TELEPHONE ENCOUNTER
----- Message from Orlin Johnson DO sent at 1/29/2024  5:00 PM EST -----  Please set patient up in office for further evaluation on possible right carotid artery stenosis.  ----- Message -----  From: LILLIANA Gonzalez  Sent: 1/26/2024   3:56 PM EST  To: Jaye Curry MD    [facility], I saw Dianelys in the nephrology clinic on 1/26.  I believe I heard a carotid bruit on the right.  It may be referred due to aortic stenosis.  See which you think.  Not quite sure if she needs follow-up -did not find any prior imaging.  Thank you

## 2024-01-30 NOTE — TELEPHONE ENCOUNTER
Called and spoke with patient's granddaughter Natividad, who advised that patient just fell and is being transported to hospital. Will call back later to schedule appointment.

## 2024-01-31 ENCOUNTER — NURSE TRIAGE (OUTPATIENT)
Age: 79
End: 2024-01-31

## 2024-01-31 NOTE — TELEPHONE ENCOUNTER
Called patient, she was discharged yesterday from ER, fx shoulder. Still a little sore, will be following up with ortho. Scheduled appointment at 11:30 2/12 with Dr Johnson.

## 2024-01-31 NOTE — TELEPHONE ENCOUNTER
"PITA FROM [employer] REQUESTING TO SPEAK TO SWETHA CALI RE: COLONOSCOPY PROCEDURE NEEDING P2P. TRACKING# [id number]. PITA CAN BE REACHED [phone].        Answer Assessment - Initial Assessment Questions  1. REASON FOR CALL or QUESTION: \"What is your reason for calling today?\" or \"How can I best help you?\" or \"What question do you have that I can help answer?\"      PITA FROM [employer] REQUESTING TO SPEAK TO SWETHA VIRAJ RE: COLONOSCOPY PROCEDURE NEEDING P2P. TRACKING# [id number]. PITA CAN BE REACHED [phone].    Protocols used: Information Only Call - No Triage-ADULT-[place]    "

## 2024-02-05 ENCOUNTER — TELEPHONE (OUTPATIENT)
Age: 79
End: 2024-02-05

## 2024-02-05 DIAGNOSIS — S42.411D CLOSED SUPRACONDYLAR FRACTURE OF RIGHT HUMERUS WITH ROUTINE HEALING, SUBSEQUENT ENCOUNTER: Primary | ICD-10-CM

## 2024-02-05 NOTE — TELEPHONE ENCOUNTER
Dr. Johnson:    Please place an order for Ortho in patient's chart.  Please advise when order is placed.

## 2024-02-05 NOTE — TELEPHONE ENCOUNTER
Dianelys's has an apt with Dr. Johnson next week for a f/u ER for fracture of arm.  Daughter is calling asking for a doctor referral for an orthopedic.  Does she need an apt before an order is written for a [facility] ortho?      [facility] gave her a referral to a different orthopedic but she wants to see a [facility] ortho.    Please call patient back.  Thank you

## 2024-02-09 ENCOUNTER — OFFICE VISIT (OUTPATIENT)
Age: 79
End: 2024-02-09
Payer: COMMERCIAL

## 2024-02-09 ENCOUNTER — APPOINTMENT (OUTPATIENT)
Dept: RADIOLOGY | Facility: CLINIC | Age: 79
End: 2024-02-09
Payer: COMMERCIAL

## 2024-02-09 VITALS
WEIGHT: 207 LBS | HEART RATE: 92 BPM | DIASTOLIC BLOOD PRESSURE: 73 MMHG | HEIGHT: 58 IN | SYSTOLIC BLOOD PRESSURE: 141 MMHG | BODY MASS INDEX: 43.45 KG/M2

## 2024-02-09 DIAGNOSIS — S42.411D CLOSED SUPRACONDYLAR FRACTURE OF RIGHT HUMERUS WITH ROUTINE HEALING, SUBSEQUENT ENCOUNTER: ICD-10-CM

## 2024-02-09 DIAGNOSIS — S42.291A CLOSED 4-PART FRACTURE OF PROXIMAL END OF RIGHT HUMERUS, INITIAL ENCOUNTER: ICD-10-CM

## 2024-02-09 PROCEDURE — 23620 CLTX GR HMRL TBRS FX WO MNPJ: CPT | Performed by: ORTHOPAEDIC SURGERY

## 2024-02-09 PROCEDURE — 99214 OFFICE O/P EST MOD 30 MIN: CPT | Performed by: ORTHOPAEDIC SURGERY

## 2024-02-09 PROCEDURE — 73030 X-RAY EXAM OF SHOULDER: CPT

## 2024-02-09 NOTE — PROGRESS NOTES
"Assessment/Plan:  1. Closed 4-part fracture of proximal end of right humerus, initial encounter  Ambulatory Referral to Orthopedic Surgery    XR shoulder 2+ vw right    Fracture / Dislocation Treatment    Sling        The patient has a displaced 4-part proximal humerus fracture. I discussed non-operative vs operative treatment of this fracture with the patient and her daughter today. I explained that operative option for this fracture would be a reverse total shoulder arthroplasty. The patient has a significant cardiac history and notes that her cardiologist does not feel she is a good surgical candidate. She is also not very active. For now, we will attempt non-operative treatment of this fracture. The patient was fit for a more comfortable sling today. She should wear thi at all times except for showering. She may ice and take OTC medications as needed for discomfort. She will FU in 4 weeks for repeat evaluation. We can always consider reverse total shoulder arthroplasty in the future if needed.    Fracture / Dislocation Treatment    Date/Time: 2/9/2024 11:00 AM    Performed by: Raudel Thornton MD  Authorized by: Raudel Thornton MD    Patient Location:  Clinic  [place] Protocol:  Time out: Immediately prior to procedure a \"time out\" was called to verify the correct patient, procedure, equipment, support staff and site/side marked as required.    Injury location:  Shoulder  Location details:  Right shoulder  Injury type:  Fracture  Fracture type: greater humeral tuberosity    Neurovascular status: Neurovascularly intact    Manipulation performed?: No    Immobilization:  Sling  Neurovascular status: Neurovascularly intact          Subjective:   Dianelys Kim is a 78 y.o. female who presents today for evaluation of her right shoulder. She sustained a fall on 1/30/24, injuring this shoulder. She had xrays at an outside facility on th day of her injury which showed a proximal humerus fracture. I am not able " to view these images today but do have this xrays report to view. She has been in a sling since that time. She notes pain about the shoulder and upper arm, which is worse with really any attempts at movement and better with rest. She notes ecchymosis about the upper arm , but denies any paresthesias of the upper extremity.   Patient has significant cardiac surgery with history or open heart surgery. She has a pacemake/defibrillator. The patient is on Eliquis.       Review of Systems   Constitutional: Negative.  Negative for chills and fever.   HENT: Negative.  Negative for ear pain and sore throat.    Eyes: Negative.  Negative for pain and redness.   Respiratory: Negative.  Negative for shortness of breath and wheezing.    Cardiovascular:  Negative for chest pain and palpitations.   Gastrointestinal: Negative.  Negative for abdominal pain and blood in stool.   Endocrine: Negative.  Negative for polydipsia and polyuria.   Genitourinary: Negative.  Negative for difficulty urinating and dysuria.   Musculoskeletal:         As noted in HPI   Skin: Negative.  Negative for pallor and rash.   Neurological: Negative.  Negative for dizziness and numbness.   Hematological: Negative.  Negative for adenopathy. Does not bruise/bleed easily.   Psychiatric/Behavioral: Negative.  Negative for confusion and suicidal ideas.          Past Medical History:   Diagnosis Date   • Coronary artery disease    • 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HCC)     bullous   • Hypertension    • Obesity    • Sarcoidosis    • SOB (shortness of breath)        Past Surgical History:   Procedure Laterality Date   • CARDIAC SURGERY  06/02/2017   • CATARACT EXTRACTION Left 07/28/2019   • CYST REMOVAL      Lipoma   • DENTAL SURGERY  12/2023   • DILATION AND CURETTAGE OF UTERUS     • EYE SURGERY     • INSERT / REPLACE / REMOVE PACEMAKER  12/20/2017   • IR BIOPSY LYMPH NODE  08/11/2023   • OVARIAN CYST REMOVAL         Family History   Problem Relation Age of Onset   • Emphysema  Father    • Cancer Sister         ovarian   • Heart attack Brother    • Heart disease Family        Social History     Occupational History   • Not on file   Tobacco Use   • Smoking status: Former     Current packs/day: 0.00     Average packs/day: 1.5 packs/day for 31.0 years (46.5 ttl pk-yrs)     Types: Cigarettes     Start date:      Quit date:      Years since quittin.1   • Smokeless tobacco: Never   Vaping Use   • Vaping status: Never Used   Substance and Sexual Activity   • Alcohol use: Not Currently     Comment: rare   • Drug use: No   • Sexual activity: Not on file         Current Outpatient Medications:   •  Acetaminophen 80 MG TBDP, Take by mouth if needed, Disp: , Rfl:   •  apixaban (ELIQUIS) 5 mg, Take 5 mg by mouth 2 (two) times a day 2022 stopped on 2022 for Procedure, Disp: , Rfl:   •  atorvastatin (LIPITOR) 10 mg tablet, take 1 tablet by mouth once daily, Disp: 90 tablet, Rfl: 2  •  Cyanocobalamin (Vitamin B 12) 100 MCG LOZG, Take 5,000 mcg by mouth in the morning, Disp: , Rfl:   •  Entresto 49-51 MG TABS, Take 1 tablet by mouth 2 (two) times a day, Disp: , Rfl:   •  glucosamine-chondroitin 500-400 MG tablet, Take 1 tablet by mouth daily , Disp: , Rfl:   •  levothyroxine 50 mcg tablet, take 1 tablet by mouth once daily, Disp: 90 tablet, Rfl: 2  •  metoprolol succinate (TOPROL-XL) 50 mg 24 hr tablet, take 1 tablet by mouth once daily, Disp: 90 tablet, Rfl: 2  •  Multiple Vitamins-Minerals (HAIR SKIN AND NAILS FORMULA PO), Take by mouth, Disp: , Rfl:   •  spironolactone (ALDACTONE) 25 mg tablet, Take 25 mg by mouth daily, Disp: , Rfl:   •  torsemide (DEMADEX) 20 mg tablet, Take 20 mg by mouth daily, Disp: , Rfl:   •  amoxicillin (AMOXIL) 500 mg capsule, take 1 capsule by mouth three times a day for 7 days (Patient not taking: Reported on 2023), Disp: , Rfl:   •  aspirin (ECOTRIN LOW STRENGTH) 81 mg EC tablet, Take 81 mg by mouth if needed, Disp: , Rfl:   •  hydrocortisone  (ANUSOL-HC) 2.5 % rectal cream, Apply topically 2 (two) times a day (Patient not taking: Reported on 2/9/2024), Disp: 30 g, Rfl: 1  •  mupirocin (BACTROBAN) 2 % ointment, Apply topically 3 (three) times a day (Patient not taking: Reported on 7/11/2023), Disp: 22 g, Rfl: 0    No Known Allergies    Objective:  Vitals:    02/09/24 1046   BP: 141/73   Pulse: 92     Pain Score:   9      Ortho Exam  Right shoulder: Swelling shoulder and upper arm. Ecchymosis extending down upper arm. Shoulder ROM and strength testing deferred. Sensation and motor intact axillary, radial, ulnar, and median nerve distributions. 2+ radial pulse.     Physical Exam  Constitutional:       General: She is not in acute distress.     Appearance: She is well-developed.   HENT:      Head: Normocephalic and atraumatic.   Eyes:      General: No scleral icterus.     Conjunctiva/sclera: Conjunctivae normal.   Neck:      Vascular: No JVD.   Cardiovascular:      Rate and Rhythm: Normal rate.   Pulmonary:      Effort: Pulmonary effort is normal. No respiratory distress.   Musculoskeletal:      Comments: As per HPI   Skin:     General: Skin is warm.   Neurological:      Mental Status: She is alert and oriented to person, place, and time.      Coordination: Coordination normal.         I have personally reviewed pertinent films in PACS and my interpretation is as follows:  Xrays right shoulder: Comminuted and displaced four-part proximal humerus fracture      This document was created using [employer] voice recognition software.   Grammatical errors, random word insertions, pronoun errors, and incomplete sentences are an occasional consequence of this system due to software limitations, ambient noise, and hardware issues.   Any formal questions or concerns about content, text, or information contained within the body of this dictation should be directly addressed to the provider for clarification.

## 2024-02-12 ENCOUNTER — LAB (OUTPATIENT)
Dept: LAB | Facility: CLINIC | Age: 79
End: 2024-02-12
Payer: COMMERCIAL

## 2024-02-12 ENCOUNTER — NURSE TRIAGE (OUTPATIENT)
Age: 79
End: 2024-02-12

## 2024-02-12 ENCOUNTER — OFFICE VISIT (OUTPATIENT)
Dept: FAMILY MEDICINE CLINIC | Facility: CLINIC | Age: 79
End: 2024-02-12
Payer: COMMERCIAL

## 2024-02-12 VITALS
SYSTOLIC BLOOD PRESSURE: 120 MMHG | HEART RATE: 88 BPM | WEIGHT: 211 LBS | OXYGEN SATURATION: 97 % | HEIGHT: 58 IN | RESPIRATION RATE: 16 BRPM | DIASTOLIC BLOOD PRESSURE: 80 MMHG | TEMPERATURE: 98 F | BODY MASS INDEX: 44.29 KG/M2

## 2024-02-12 DIAGNOSIS — I25.5 ISCHEMIC CARDIOMYOPATHY: ICD-10-CM

## 2024-02-12 DIAGNOSIS — E83.52 HYPERCALCEMIA: ICD-10-CM

## 2024-02-12 DIAGNOSIS — R26.2 AMBULATORY DYSFUNCTION: Primary | ICD-10-CM

## 2024-02-12 DIAGNOSIS — I12.9 BENIGN HYPERTENSION WITH CHRONIC KIDNEY DISEASE, STAGE III (HCC): ICD-10-CM

## 2024-02-12 DIAGNOSIS — I48.19 PERSISTENT ATRIAL FIBRILLATION (HCC): ICD-10-CM

## 2024-02-12 DIAGNOSIS — E87.1 HYPONATREMIA: ICD-10-CM

## 2024-02-12 DIAGNOSIS — S42.291D CLOSED 4-PART FRACTURE OF PROXIMAL END OF RIGHT HUMERUS WITH ROUTINE HEALING, SUBSEQUENT ENCOUNTER: ICD-10-CM

## 2024-02-12 DIAGNOSIS — N18.4 CHRONIC RENAL DISEASE, STAGE IV (HCC): ICD-10-CM

## 2024-02-12 DIAGNOSIS — E03.9 ACQUIRED HYPOTHYROIDISM: ICD-10-CM

## 2024-02-12 DIAGNOSIS — N18.32 STAGE 3B CHRONIC KIDNEY DISEASE (HCC): ICD-10-CM

## 2024-02-12 DIAGNOSIS — I27.20 PULMONARY HYPERTENSION (HCC): ICD-10-CM

## 2024-02-12 DIAGNOSIS — D86.9 SARCOIDOSIS: Chronic | ICD-10-CM

## 2024-02-12 DIAGNOSIS — G47.33 OBSTRUCTIVE SLEEP APNEA: Chronic | ICD-10-CM

## 2024-02-12 DIAGNOSIS — Z79.01 CURRENT USE OF LONG TERM ANTICOAGULATION: ICD-10-CM

## 2024-02-12 DIAGNOSIS — N18.30 BENIGN HYPERTENSION WITH CHRONIC KIDNEY DISEASE, STAGE III (HCC): ICD-10-CM

## 2024-02-12 DIAGNOSIS — J43.2 CENTRILOBULAR EMPHYSEMA (HCC): ICD-10-CM

## 2024-02-12 DIAGNOSIS — E55.9 VITAMIN D DEFICIENCY: ICD-10-CM

## 2024-02-12 PROBLEM — S42.293D: Status: ACTIVE | Noted: 2024-02-09

## 2024-02-12 LAB
ANION GAP SERPL CALCULATED.3IONS-SCNC: 13 MMOL/L
BACTERIA UR QL AUTO: ABNORMAL /HPF
BILIRUB UR QL STRIP: NEGATIVE
BUN SERPL-MCNC: 41 MG/DL (ref 5–25)
CALCIUM SERPL-MCNC: 9.9 MG/DL (ref 8.4–10.2)
CHLORIDE SERPL-SCNC: 98 MMOL/L (ref 96–108)
CLARITY UR: CLEAR
CO2 SERPL-SCNC: 27 MMOL/L (ref 21–32)
COLOR UR: ABNORMAL
CREAT SERPL-MCNC: 1.52 MG/DL (ref 0.6–1.3)
CREAT UR-MCNC: 46.6 MG/DL
GFR SERPL CREATININE-BSD FRML MDRD: 32 ML/MIN/1.73SQ M
GLUCOSE SERPL-MCNC: 95 MG/DL (ref 65–140)
GLUCOSE UR STRIP-MCNC: NEGATIVE MG/DL
HGB UR QL STRIP.AUTO: NEGATIVE
HYALINE CASTS #/AREA URNS LPF: ABNORMAL /LPF
KETONES UR STRIP-MCNC: NEGATIVE MG/DL
LEUKOCYTE ESTERASE UR QL STRIP: NEGATIVE
MICROALBUMIN UR-MCNC: <7 MG/L
MICROALBUMIN/CREAT 24H UR: <15 MG/G CREATININE (ref 0–30)
NITRITE UR QL STRIP: NEGATIVE
NON-SQ EPI CELLS URNS QL MICRO: ABNORMAL /HPF
PH UR STRIP.AUTO: 6.5 [PH]
POTASSIUM SERPL-SCNC: 4.5 MMOL/L (ref 3.5–5.3)
PROT UR STRIP-MCNC: NEGATIVE MG/DL
RBC #/AREA URNS AUTO: ABNORMAL /HPF
SODIUM SERPL-SCNC: 138 MMOL/L (ref 135–147)
SP GR UR STRIP.AUTO: 1.01 (ref 1–1.03)
UROBILINOGEN UR STRIP-ACNC: <2 MG/DL
WBC #/AREA URNS AUTO: ABNORMAL /HPF

## 2024-02-12 PROCEDURE — 36415 COLL VENOUS BLD VENIPUNCTURE: CPT

## 2024-02-12 PROCEDURE — 81001 URINALYSIS AUTO W/SCOPE: CPT

## 2024-02-12 PROCEDURE — 80048 BASIC METABOLIC PNL TOTAL CA: CPT

## 2024-02-12 PROCEDURE — 82043 UR ALBUMIN QUANTITATIVE: CPT

## 2024-02-12 PROCEDURE — 82570 ASSAY OF URINE CREATININE: CPT

## 2024-02-12 PROCEDURE — 99214 OFFICE O/P EST MOD 30 MIN: CPT | Performed by: STUDENT IN AN ORGANIZED HEALTH CARE EDUCATION/TRAINING PROGRAM

## 2024-02-12 NOTE — TELEPHONE ENCOUNTER
Regarding: broke rt shoulder colonoscopy scheduled  ----- Message from Katerin Massey sent at 2/12/2024  3:04 PM EST -----  Pt has a colon scheduled for 3/4/24 but she recently broke her right shoulder. Pt is wondering if she will need to be on her right side during the procedure because if so she would need to cancel. Please reach back out to the pt

## 2024-02-12 NOTE — PROGRESS NOTES
Clinic Visit Note  Dianelys Kim 78 y.o. female   MRN: [id number]    Assessment and Plan      Diagnoses and all orders for this visit:    Ambulatory dysfunction  Recommend physical therapy to work on ambulatory dysfunction, stretching/strengthening exercises  -     Ambulatory Referral to Physical Therapy; Future    Chronic renal disease, stage IV (HCC)  Repeat blood work pending    Centrilobular emphysema (HCC)    Acquired hypothyroidism  Continue levothyroxine supplementation    Benign Hypertension  Blood pressure appropriate on exam today.  -     Ambulatory Referral to Cardiology; Future    Persistent atrial fibrillation (HCC)  Eliquis anticoagulation, beta-blocker rate control, vital stable  -     Ambulatory Referral to Cardiology; Future    Pulmonary hypertension (HCC)    Current use of long term anticoagulation    Ischemic cardiomyopathy  Continue close follow-up with cardiology which is planned for early next month, if patient wishes to switch cardiology for transportation concerns, referral given.    Closed 4-part fracture of proximal end of right humerus with routine healing, subsequent encounter  Patient is currently in bracing, following closely with orthopedics, nonsurgical approach    Small bruising with mass upper right quadrant of left breast, monitor for resolution.    My impressions and treatment recommendations were discussed in detail with the patient who verbalized understanding and had no further questions.  Discharge instructions were provided.    Subjective     Chief Complaint: F/U    History of Present Illness:    Follow-up after mechanical fall, video reviewed.    The following portions of the patient's history were reviewed and updated as appropriate: allergies, current medications, past family history, past medical history, past social history, past surgical history and problem list.    REVIEW OF SYSTEMS:  A complete 12-point review of systems is negative other than that noted in the  HPI.    Review of Systems   Constitutional:  Negative for chills and fever.   HENT:  Negative for ear pain and sore throat.    Eyes:  Negative for pain and visual disturbance.   Respiratory:  Negative for cough and shortness of breath.    Cardiovascular:  Negative for chest pain and palpitations.   Gastrointestinal:  Negative for abdominal pain and vomiting.   Genitourinary:  Negative for dysuria and hematuria.   Musculoskeletal:  Negative for arthralgias and back pain.   Skin:  Negative for color change and rash.   Neurological:  Negative for seizures and syncope.   Hematological:  Bruises/bleeds easily.   All other systems reviewed and are negative.        Current Outpatient Medications:   •  Acetaminophen 80 MG TBDP, Take by mouth if needed, Disp: , Rfl:   •  apixaban (ELIQUIS) 5 mg, Take 5 mg by mouth 2 (two) times a day 6/2/2022 stopped on 5/31/2022 for Procedure, Disp: , Rfl:   •  aspirin (ECOTRIN LOW STRENGTH) 81 mg EC tablet, Take 81 mg by mouth if needed, Disp: , Rfl:   •  atorvastatin (LIPITOR) 10 mg tablet, take 1 tablet by mouth once daily, Disp: 90 tablet, Rfl: 2  •  Cyanocobalamin (Vitamin B 12) 100 MCG LOZG, Take 5,000 mcg by mouth in the morning, Disp: , Rfl:   •  Entresto 49-51 MG TABS, Take 1 tablet by mouth 2 (two) times a day, Disp: , Rfl:   •  glucosamine-chondroitin 500-400 MG tablet, Take 1 tablet by mouth daily , Disp: , Rfl:   •  levothyroxine 50 mcg tablet, take 1 tablet by mouth once daily, Disp: 90 tablet, Rfl: 2  •  metoprolol succinate (TOPROL-XL) 50 mg 24 hr tablet, take 1 tablet by mouth once daily, Disp: 90 tablet, Rfl: 2  •  Multiple Vitamins-Minerals (HAIR SKIN AND NAILS FORMULA PO), Take by mouth, Disp: , Rfl:   •  spironolactone (ALDACTONE) 25 mg tablet, Take 25 mg by mouth daily, Disp: , Rfl:   •  torsemide (DEMADEX) 20 mg tablet, Take 20 mg by mouth daily, Disp: , Rfl:   •  amoxicillin (AMOXIL) 500 mg capsule, take 1 capsule by mouth three times a day for 7 days (Patient not  taking: Reported on 2023), Disp: , Rfl:   •  hydrocortisone (ANUSOL-HC) 2.5 % rectal cream, Apply topically 2 (two) times a day (Patient not taking: Reported on 2024), Disp: 30 g, Rfl: 1  •  mupirocin (BACTROBAN) 2 % ointment, Apply topically 3 (three) times a day (Patient not taking: Reported on 2023), Disp: 22 g, Rfl: 0  Past Medical History:   Diagnosis Date   • Coronary artery disease    • 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HCC)     bullous   • Hypertension    • Obesity    • Sarcoidosis    • SOB (shortness of breath)      Past Surgical History:   Procedure Laterality Date   • CARDIAC SURGERY  2017   • CATARACT EXTRACTION Left 2019   • CYST REMOVAL      Lipoma   • DENTAL SURGERY  2023   • DILATION AND CURETTAGE OF UTERUS     • EYE SURGERY     • INSERT / REPLACE / REMOVE PACEMAKER  2017   • IR BIOPSY LYMPH NODE  2023   • OVARIAN CYST REMOVAL       Social History     Socioeconomic History   • Marital status:      Spouse name: Not on file   • Number of children: Not on file   • Years of education: Not on file   • Highest education level: Not on file   Occupational History   • Not on file   Tobacco Use   • Smoking status: Former     Current packs/day: 0.00     Average packs/day: 1.5 packs/day for 31.0 years (46.5 ttl pk-yrs)     Types: Cigarettes     Start date:      Quit date:      Years since quittin.1   • Smokeless tobacco: Never   Vaping Use   • Vaping status: Never Used   Substance and Sexual Activity   • Alcohol use: Not Currently     Comment: rare   • Drug use: No   • Sexual activity: Not on file   Other Topics Concern   • Not on file   Social History Narrative   • Not on file     Social Determinants of Health     Financial Resource Strain: Not on file   Food Insecurity: Not on file   Transportation Needs: Not on file   Physical Activity: Not on file   Stress: Not on file   Social Connections: Not on file   Intimate Partner Violence: Not on file   Housing Stability:  "Not on file     Family History   Problem Relation Age of Onset   • Emphysema Father    • Cancer Sister         ovarian   • Heart attack Brother    • Heart disease Family      No Known Allergies    Objective     Vitals:    02/12/24 1135   BP: 120/80   BP Location: Left arm   Patient Position: Sitting   Cuff Size: Adult   Pulse: 88   Resp: 16   Temp: 98 °F (36.7 °C)   TempSrc: Temporal   SpO2: 97%   Weight: 95.7 kg (211 lb)   Height: 4' 10\" (1.473 m)       Physical Exam:     GENERAL: NAD, pleasant   HEENT:  NC/AT, PERRL, EOMI, no scleral icterus  CARDIAC:  RRR, +S1/S2, no S3/S4 appreciated, no m/g/r  PULMONARY:  CTA B/L, no wheezing/rales/rhonci, non-labored breathing  ABDOMEN:  Soft, NT/ND, no rebound/guarding/rigidity  Extremities:. No edema, cyanosis, or clubbing  Musculoskeletal:  Full range of motion intact in all extremities   NEUROLOGIC: Grossly intact, no focal deficits  SKIN:  No rashes or erythema noted on exposed skin  Psych: Normal affect, mood stable    Bruise improving on left upper breast, continue to monitor for resolution    Bruising on face, right elbow.    Patient is currently in right upper extremity bracing.    ==  PLEASE NOTE:  This encounter was completed utilizing the [employer]- [employer] Direct Speech Voice Recognition Software. Grammatical errors, random word insertions, pronoun errors and incomplete sentences are occasional consequences of the system due to software limitations, ambient noise and hardware issues.These may be missed by proof reading prior to affixing electronic signature. Any questions or concerns about the content, text or information contained within the body of this dictation should be directly addressed to the physician for clarification. Please do not hesitate to call me directly if you have any any questions or concerns.    Orlin Johnson, DO  [facility]   [facility]     "

## 2024-02-12 NOTE — TELEPHONE ENCOUNTER
"SPOKE WITH PT, INFORMED SHE WILL BE ON HER LEFT SIDE FOR PROCEDURE. PT WILL ASSESS HER MOBILITY OVER THE NEXT FEW WEEKS. SHE WILL CALL US BACK IF SHE NEEDS TO RESCHEDULE.         Reason for Disposition   Information only question and nurse able to answer    Answer Assessment - Initial Assessment Questions  1. REASON FOR CALL or QUESTION: \"What is your reason for calling today?\" or \"How can I best help you?\" or \"What question do you have that I can help answer?\"      Pt has a colon scheduled for 3/4/24 but she recently broke her right shoulder. Pt is wondering if she will need to be on her right side during the procedure because if so she would need to cancel.    Protocols used: Information Only Call - No Triage-ADULT-[place]    "

## 2024-02-15 NOTE — RESULT ENCOUNTER NOTE
Please let Dianelys know that labs are stable.  We will be seeing her in follow-up in April.  Thank you.

## 2024-02-16 ENCOUNTER — TELEPHONE (OUTPATIENT)
Dept: NEPHROLOGY | Facility: CLINIC | Age: 79
End: 2024-02-16

## 2024-02-16 DIAGNOSIS — E78.5 HYPERLIPIDEMIA, UNSPECIFIED HYPERLIPIDEMIA TYPE: ICD-10-CM

## 2024-02-16 RX ORDER — ATORVASTATIN CALCIUM 10 MG/1
TABLET, FILM COATED ORAL
Qty: 90 TABLET | Refills: 1 | Status: SHIPPED | OUTPATIENT
Start: 2024-02-16

## 2024-02-16 NOTE — TELEPHONE ENCOUNTER
Message left on patient's VM that labs are stable per Jimena.  Pt has f/u visit 4/15/24.    ----- Message from LILLIANA Gonzalez sent at 2/15/2024  5:14 PM EST -----  Please let Dianelys know that labs are stable.  We will be seeing her in follow-up in April.  Thank you.

## 2024-02-19 ENCOUNTER — ANESTHESIA EVENT (OUTPATIENT)
Dept: ANESTHESIOLOGY | Facility: HOSPITAL | Age: 79
End: 2024-02-19

## 2024-02-19 ENCOUNTER — ANESTHESIA (OUTPATIENT)
Dept: ANESTHESIOLOGY | Facility: HOSPITAL | Age: 79
End: 2024-02-19

## 2024-02-26 ENCOUNTER — TELEPHONE (OUTPATIENT)
Dept: GASTROENTEROLOGY | Facility: CLINIC | Age: 79
End: 2024-02-26

## 2024-02-26 NOTE — TELEPHONE ENCOUNTER
Called to confirm colonoscopy for 3/4 with Dr. Riley and patient cancelled until she is feeling better. She fell and broke her shoulder, in a lot of pain. Will call in a couple of weeks to reschedule.

## 2024-03-08 ENCOUNTER — APPOINTMENT (OUTPATIENT)
Dept: RADIOLOGY | Facility: CLINIC | Age: 79
End: 2024-03-08
Payer: COMMERCIAL

## 2024-03-08 ENCOUNTER — OFFICE VISIT (OUTPATIENT)
Age: 79
End: 2024-03-08

## 2024-03-08 DIAGNOSIS — S42.291A CLOSED 4-PART FRACTURE OF PROXIMAL END OF RIGHT HUMERUS, INITIAL ENCOUNTER: ICD-10-CM

## 2024-03-08 DIAGNOSIS — S42.291D CLOSED 4-PART FRACTURE OF PROXIMAL END OF RIGHT HUMERUS WITH ROUTINE HEALING, SUBSEQUENT ENCOUNTER: Primary | ICD-10-CM

## 2024-03-08 PROCEDURE — 99024 POSTOP FOLLOW-UP VISIT: CPT | Performed by: ORTHOPAEDIC SURGERY

## 2024-03-08 PROCEDURE — 73030 X-RAY EXAM OF SHOULDER: CPT

## 2024-03-08 NOTE — PROGRESS NOTES
Assessment/Plan:  1. Closed 4-part fracture of proximal end of right humerus with routine healing, subsequent encounter  XR shoulder 2+ vw right    Ambulatory Referral to Physical Therapy        Scribe Attestation    I,:  Anne Marie Shaqzuly am acting as a scribe while in the presence of the attending physician.:       I,:  Raudel Thornton MD personally performed the services described in this documentation    as scribed in my presence.:           Dianelys is a pleasant 78 y.o. female with displaced 4 part proximal humerus fracture treated nonoperatively due to substantial medical co-morbidities . DOI 1/30/2024. Patient was non compliant with sling use due to increased pain. She states she stopped using it 3 weeks ago. Repeat x-rays obtained today demonstrate interval callous formation. Referral to physical therapy provided to begin next week. Follow up in 4 weeks, repeat x-ray on arrival.       Subjective:   Dianelys Kim is a 78 y.o. female who presents for follow up evaluation of displaced 4 part proximal humerus fracture. Patient has significant cardiac history and is not a good surgical candidate. At her last visit we discussed treating this fracture non operatively. Patient was placed in a sling to be worn at all times but states that due to increased pain she stopped use 3 weeks ago. She notes continued pain to the shoulder and limitations in range of motion. Denies numbness or paresthesias.       Review of Systems   Constitutional:  Negative for chills and fever.   HENT:  Negative for ear pain and sore throat.    Eyes:  Negative for pain and visual disturbance.   Respiratory:  Negative for cough and shortness of breath.    Cardiovascular:  Negative for chest pain and palpitations.   Gastrointestinal:  Negative for abdominal pain and vomiting.   Genitourinary:  Negative for dysuria and hematuria.   Musculoskeletal:  Negative for arthralgias and back pain.   Skin:  Negative for color change and rash.    Neurological:  Negative for seizures and syncope.   All other systems reviewed and are negative.        Past Medical History:   Diagnosis Date   • Coronary artery disease    • 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HCC)     bullous   • Hypertension    • Obesity    • Sarcoidosis    • SOB (shortness of breath)        Past Surgical History:   Procedure Laterality Date   • CARDIAC SURGERY  2017   • CATARACT EXTRACTION Left 2019   • CYST REMOVAL      Lipoma   • DENTAL SURGERY  2023   • DILATION AND CURETTAGE OF UTERUS     • EYE SURGERY     • INSERT / REPLACE / REMOVE PACEMAKER  2017   • IR BIOPSY LYMPH NODE  2023   • OVARIAN CYST REMOVAL         Family History   Problem Relation Age of Onset   • Emphysema Father    • Cancer Sister         ovarian   • Heart attack Brother    • Heart disease Family        Social History     Occupational History   • Not on file   Tobacco Use   • Smoking status: Former     Current packs/day: 0.00     Average packs/day: 1.5 packs/day for 31.0 years (46.5 ttl pk-yrs)     Types: Cigarettes     Start date:      Quit date:      Years since quittin.2   • Smokeless tobacco: Never   Vaping Use   • Vaping status: Never Used   Substance and Sexual Activity   • Alcohol use: Not Currently     Comment: rare   • Drug use: No   • Sexual activity: Not on file         Current Outpatient Medications:   •  Acetaminophen 80 MG TBDP, Take by mouth if needed, Disp: , Rfl:   •  amoxicillin (AMOXIL) 500 mg capsule, take 1 capsule by mouth three times a day for 7 days (Patient not taking: Reported on 2023), Disp: , Rfl:   •  apixaban (ELIQUIS) 5 mg, Take 5 mg by mouth 2 (two) times a day 2022 stopped on 2022 for Procedure, Disp: , Rfl:   •  aspirin (ECOTRIN LOW STRENGTH) 81 mg EC tablet, Take 81 mg by mouth if needed, Disp: , Rfl:   •  atorvastatin (LIPITOR) 10 mg tablet, take 1 tablet by mouth once daily, Disp: 90 tablet, Rfl: 1  •  Cyanocobalamin (Vitamin B 12) 100 MCG JOANNA,  Take 5,000 mcg by mouth in the morning, Disp: , Rfl:   •  Entresto 49-51 MG TABS, Take 1 tablet by mouth 2 (two) times a day, Disp: , Rfl:   •  glucosamine-chondroitin 500-400 MG tablet, Take 1 tablet by mouth daily , Disp: , Rfl:   •  hydrocortisone (ANUSOL-HC) 2.5 % rectal cream, Apply topically 2 (two) times a day (Patient not taking: Reported on 2/9/2024), Disp: 30 g, Rfl: 1  •  levothyroxine 50 mcg tablet, take 1 tablet by mouth once daily, Disp: 90 tablet, Rfl: 2  •  metoprolol succinate (TOPROL-XL) 50 mg 24 hr tablet, take 1 tablet by mouth once daily, Disp: 90 tablet, Rfl: 2  •  Multiple Vitamins-Minerals (HAIR SKIN AND NAILS FORMULA PO), Take by mouth, Disp: , Rfl:   •  mupirocin (BACTROBAN) 2 % ointment, Apply topically 3 (three) times a day (Patient not taking: Reported on 7/11/2023), Disp: 22 g, Rfl: 0  •  spironolactone (ALDACTONE) 25 mg tablet, Take 25 mg by mouth daily, Disp: , Rfl:   •  torsemide (DEMADEX) 20 mg tablet, Take 20 mg by mouth daily, Disp: , Rfl:     No Known Allergies    Objective:  There were no vitals filed for this visit.    Right Shoulder Exam     Other   Erythema: absent  Sensation: normal  Pulse: present    Comments:  Ecchymosis diffuse to upper extremity  Sensation intact  Brisk capillary refill   TTP proximal humerus   Limitations in range of motion compared to contralateral side               Physical Exam  Vitals and nursing note reviewed.   HENT:      Head: Normocephalic.   Eyes:      Extraocular Movements: Extraocular movements intact.   Cardiovascular:      Rate and Rhythm: Normal rate.      Pulses: Normal pulses.   Pulmonary:      Effort: Pulmonary effort is normal.   Musculoskeletal:         General: Normal range of motion.      Cervical back: Normal range of motion.      Comments: See ortho exam   Skin:     General: Skin is warm and dry.   Neurological:      General: No focal deficit present.      Mental Status: She is alert.   Psychiatric:         Behavior: Behavior  normal.         I have personally reviewed pertinent films in PACS and my interpretation is as follows:  Right shoulder xrays, 3 veiw, show a displaced 4 part proximal humerus fracture with interval callous formation and maintained alignment from previous.  There is slight inferior pseudosubluxation.       This document was created using speech voice recognition software.   Grammatical errors, random word insertions, pronoun errors, and incomplete sentences are an occasional consequence of this system due to software limitations, ambient noise, and hardware issues.   Any formal questions or concerns about content, text, or information contained within the body of this dictation should be directly addressed to the provider for clarification.

## 2024-03-11 ENCOUNTER — OFFICE VISIT (OUTPATIENT)
Dept: FAMILY MEDICINE CLINIC | Facility: CLINIC | Age: 79
End: 2024-03-11
Payer: COMMERCIAL

## 2024-03-11 VITALS — RESPIRATION RATE: 12 BRPM | OXYGEN SATURATION: 98 % | HEART RATE: 90 BPM

## 2024-03-11 DIAGNOSIS — J43.2 CENTRILOBULAR EMPHYSEMA (HCC): Chronic | ICD-10-CM

## 2024-03-11 DIAGNOSIS — N18.4 CHRONIC RENAL DISEASE, STAGE IV (HCC): ICD-10-CM

## 2024-03-11 DIAGNOSIS — E03.9 ACQUIRED HYPOTHYROIDISM: Primary | ICD-10-CM

## 2024-03-11 DIAGNOSIS — I48.19 PERSISTENT ATRIAL FIBRILLATION (HCC): ICD-10-CM

## 2024-03-11 DIAGNOSIS — S42.291D CLOSED 4-PART FRACTURE OF PROXIMAL END OF RIGHT HUMERUS WITH ROUTINE HEALING, SUBSEQUENT ENCOUNTER: ICD-10-CM

## 2024-03-11 DIAGNOSIS — I25.5 ISCHEMIC CARDIOMYOPATHY: ICD-10-CM

## 2024-03-11 PROCEDURE — G2211 COMPLEX E/M VISIT ADD ON: HCPCS | Performed by: STUDENT IN AN ORGANIZED HEALTH CARE EDUCATION/TRAINING PROGRAM

## 2024-03-11 PROCEDURE — 99214 OFFICE O/P EST MOD 30 MIN: CPT | Performed by: STUDENT IN AN ORGANIZED HEALTH CARE EDUCATION/TRAINING PROGRAM

## 2024-03-11 NOTE — PROGRESS NOTES
Clinic Visit Note  Dianelys Kim 78 y.o. female   MRN: [id number]    Assessment and Plan      Diagnoses and all orders for this visit:    Acquired hypothyroidism  Continue levothyroxine supplementation    Centrilobular emphysema (HCC)  Lungs clear to auscultation bilaterally    Ischemic cardiomyopathy  Euvolemic on exam, no chest pain or shortness of breath, following with cardiology, medication appropriate    Persistent atrial fibrillation (HCC)  Toprol-XL, Eliquis anticoagulation    Closed 4-part fracture of proximal end of right humerus with routine healing, subsequent encounter  Continue follow-up with orthopedics, plan to start physical therapy soon    Chronic renal disease, stage IV (HCC)  Most recent GFR appropriate    My impressions and treatment recommendations were discussed in detail with the patient who verbalized understanding and had no further questions.  Discharge instructions were provided.    Subjective     Chief Complaint: Follow-up visit    History of Present Illness:    Patient is a pleasant 78-year-old female coming in for follow-up visit on chronic disease management.    The following portions of the patient's history were reviewed and updated as appropriate: allergies, current medications, past family history, past medical history, past social history, past surgical history and problem list.    REVIEW OF SYSTEMS:  A complete 12-point review of systems is negative other than that noted in the HPI.    Review of Systems   Constitutional:  Positive for fatigue. Negative for chills and fever.   Respiratory:  Negative for cough, shortness of breath and wheezing.    Cardiovascular:  Negative for chest pain, palpitations and leg swelling.   Gastrointestinal:  Negative for constipation, diarrhea, nausea and vomiting.   Musculoskeletal:  Positive for joint swelling. Negative for back pain, gait problem and neck pain.   Neurological:  Negative for dizziness and headaches.         Current Outpatient  Medications:   •  Acetaminophen 80 MG TBDP, Take by mouth if needed, Disp: , Rfl:   •  amoxicillin (AMOXIL) 500 mg capsule, take 1 capsule by mouth three times a day for 7 days (Patient not taking: Reported on 9/14/2023), Disp: , Rfl:   •  apixaban (ELIQUIS) 5 mg, Take 5 mg by mouth 2 (two) times a day 6/2/2022 stopped on 5/31/2022 for Procedure, Disp: , Rfl:   •  aspirin (ECOTRIN LOW STRENGTH) 81 mg EC tablet, Take 81 mg by mouth if needed, Disp: , Rfl:   •  atorvastatin (LIPITOR) 10 mg tablet, take 1 tablet by mouth once daily, Disp: 90 tablet, Rfl: 1  •  Cyanocobalamin (Vitamin B 12) 100 MCG LOZG, Take 5,000 mcg by mouth in the morning, Disp: , Rfl:   •  Entresto 49-51 MG TABS, Take 1 tablet by mouth 2 (two) times a day, Disp: , Rfl:   •  glucosamine-chondroitin 500-400 MG tablet, Take 1 tablet by mouth daily , Disp: , Rfl:   •  hydrocortisone (ANUSOL-HC) 2.5 % rectal cream, Apply topically 2 (two) times a day (Patient not taking: Reported on 2/9/2024), Disp: 30 g, Rfl: 1  •  levothyroxine 50 mcg tablet, take 1 tablet by mouth once daily, Disp: 90 tablet, Rfl: 2  •  metoprolol succinate (TOPROL-XL) 50 mg 24 hr tablet, take 1 tablet by mouth once daily, Disp: 90 tablet, Rfl: 2  •  Multiple Vitamins-Minerals (HAIR SKIN AND NAILS FORMULA PO), Take by mouth, Disp: , Rfl:   •  mupirocin (BACTROBAN) 2 % ointment, Apply topically 3 (three) times a day (Patient not taking: Reported on 7/11/2023), Disp: 22 g, Rfl: 0  •  spironolactone (ALDACTONE) 25 mg tablet, Take 25 mg by mouth daily, Disp: , Rfl:   •  torsemide (DEMADEX) 20 mg tablet, Take 20 mg by mouth daily, Disp: , Rfl:   Past Medical History:   Diagnosis Date   • Coronary artery disease    • 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HCC)     bullous   • Hypertension    • Obesity    • Sarcoidosis    • SOB (shortness of breath)      Past Surgical History:   Procedure Laterality Date   • CARDIAC SURGERY  06/02/2017   • CATARACT EXTRACTION Left 07/28/2019   • CYST REMOVAL      Lipoma   •  DENTAL SURGERY  2023   • DILATION AND CURETTAGE OF UTERUS     • EYE SURGERY     • INSERT / REPLACE / REMOVE PACEMAKER  2017   • IR BIOPSY LYMPH NODE  2023   • OVARIAN CYST REMOVAL       Social History     Socioeconomic History   • Marital status:      Spouse name: Not on file   • Number of children: Not on file   • Years of education: Not on file   • Highest education level: Not on file   Occupational History   • Not on file   Tobacco Use   • Smoking status: Former     Current packs/day: 0.00     Average packs/day: 1.5 packs/day for 31.0 years (46.5 ttl pk-yrs)     Types: Cigarettes     Start date:      Quit date:      Years since quittin.2   • Smokeless tobacco: Never   Vaping Use   • Vaping status: Never Used   Substance and Sexual Activity   • Alcohol use: Not Currently     Comment: rare   • Drug use: No   • Sexual activity: Not on file   Other Topics Concern   • Not on file   Social History Narrative   • Not on file     Social Determinants of Health     Financial Resource Strain: Not on file   Food Insecurity: Not on file   Transportation Needs: Not on file   Physical Activity: Not on file   Stress: Not on file   Social Connections: Not on file   Intimate Partner Violence: Not on file   Housing Stability: Not on file     Family History   Problem Relation Age of Onset   • Emphysema Father    • Cancer Sister         ovarian   • Heart attack Brother    • Heart disease Family      No Known Allergies    Objective     Vitals:    24 1428   Pulse: 90   Resp: 12   SpO2: 98%       Physical Exam:     GENERAL: NAD, pleasant   HEENT:  NC/AT, PERRL, EOMI, no scleral icterus  CARDIAC:  RRR, +S1/S2, no S3/S4 appreciated, no m/g/r  PULMONARY:  CTA B/L, no wheezing/rales/rhonci, non-labored breathing  ABDOMEN:  Soft, NT/ND, no rebound/guarding/rigidity  Extremities:. No edema, cyanosis, or clubbing  Musculoskeletal:  Full range of motion intact in all extremities   NEUROLOGIC: Grossly  intact, no focal deficits  SKIN:  No rashes or erythema noted on exposed skin  Psych: Normal affect, mood stable    ==  PLEASE NOTE:  This encounter was completed utilizing the [employer] Direct Speech Voice Recognition Software. Grammatical errors, random word insertions, pronoun errors and incomplete sentences are occasional consequences of the system due to software limitations, ambient noise and hardware issues.These may be missed by proof reading prior to affixing electronic signature. Any questions or concerns about the content, text or information contained within the body of this dictation should be directly addressed to the physician for clarification. Please do not hesitate to call me directly if you have any any questions or concerns.    Orlin Johnson, DO  [facility]   [facility]

## 2024-03-19 ENCOUNTER — TELEPHONE (OUTPATIENT)
Age: 79
End: 2024-03-19

## 2024-03-19 NOTE — TELEPHONE ENCOUNTER
[facility] is faxing over a Oxygen order request for Trinidad Adrian to sign off on for this patient. They had sent this previously on March 6th but will be sending it again today to Fax #[phone]. Please advise

## 2024-03-27 ENCOUNTER — OFFICE VISIT (OUTPATIENT)
Dept: PULMONOLOGY | Facility: MEDICAL CENTER | Age: 79
End: 2024-03-27
Payer: COMMERCIAL

## 2024-03-27 VITALS
HEIGHT: 58 IN | SYSTOLIC BLOOD PRESSURE: 108 MMHG | DIASTOLIC BLOOD PRESSURE: 74 MMHG | TEMPERATURE: 97.5 F | WEIGHT: 205.4 LBS | HEART RATE: 71 BPM | BODY MASS INDEX: 43.12 KG/M2 | RESPIRATION RATE: 12 BRPM | OXYGEN SATURATION: 94 %

## 2024-03-27 DIAGNOSIS — G47.34 HYPOXIA, SLEEP RELATED: Primary | ICD-10-CM

## 2024-03-27 DIAGNOSIS — I27.20 PULMONARY HYPERTENSION (HCC): ICD-10-CM

## 2024-03-27 DIAGNOSIS — D86.9 SARCOIDOSIS: Chronic | ICD-10-CM

## 2024-03-27 DIAGNOSIS — E66.01 CLASS 3 SEVERE OBESITY DUE TO EXCESS CALORIES WITH SERIOUS COMORBIDITY AND BODY MASS INDEX (BMI) OF 40.0 TO 44.9 IN ADULT (HCC): ICD-10-CM

## 2024-03-27 PROCEDURE — 99214 OFFICE O/P EST MOD 30 MIN: CPT | Performed by: INTERNAL MEDICINE

## 2024-03-27 NOTE — PATIENT INSTRUCTIONS
You can take melatonin 3 to 5 mg at night to help with sleep.    We will order pulse oxymetry recording while you use 3 L of Oxygen at night.    Call your cardiologist about getting echocardiogram reordered and approved-authorized

## 2024-03-27 NOTE — PROGRESS NOTES
Assessment/Plan        Problem List Items Addressed This Visit          Cardiovascular and Mediastinum    Pulmonary hypertension (HCC)     I reviewed results of echocardiogram done March 2023.  This showed LV systolic function to be normal however right ventricular systolic pressure was elevated at 50 to 60 mL mercury.  She also had evidence of at least moderate mitral regurgitation and mild mitral stenosis and moderate aortic stenosis.  I did tell her to check with her cardiologist at [facility] about whether he would want to do a follow-up echocardiogram later this year to reassess her valvular heart disease and pulmonary artery hypertension            Respiratory    Hypoxia, sleep related - Primary     She does have sleep-related hypoxemia which could be in part due to alveolar hypoventilation from obesity.  She also has moderate HERMANN with AHI of 25 but not 1 of pursue CPAP therapy.  She does have oxygen at home that she is supposed to be using 3 L/min at bedtime but has not been using recently.  I will order room air O2 saturation read to me and Shiloh to assess to see if she has any significant oxygen desaturation during sleep without using her oxygen         Relevant Orders    Pulse oximetry overnight       Other    Sarcoidosis (Chronic)     Dianelys was diagnosed with sarcoidosis in 2014 by right cervical lymph node biopsy done at [facility].  She has no evidence of any interstitial lung disease.  She does have some mild mediastinal and bilateral hilar adenopathy.    Not having any problem secondary to sarcoidosis at this time.         Class 3 severe obesity due to excess calories with serious comorbidity and body mass index (BMI) of 40.0 to 44.9 in adult (HCC)     I did encourage her to try to watch her diet and try to lose weight.              CC; doing okay.  Not short of breath with her usual activity      HPI    Dianelys has sarcoidosis diagnosed 2014 by right cervical lymph node biopsy done at [facility].  This showed noncaseating granulomas consistent with sarcoidosis.  She does have mediastinal and bilateral hilar adenopathy but no interstitial lung disease.  Spirometry done last office visit was normal.  She did undergo a sound guided right axillary lymph node biopsy on 8/11/2023 that showed nonnecrotizing granulomatous inflammation with foreign body giant cells.  No evidence of malignancy.  AFB and fungal organisms stains were negative.    Dianelys fell on January 30 and fractured humeral head right shoulder.  She did use arm sling for 2 weeks.  Decreased motion of right upper arm due to this injury.  Also has some increased shortness of breath with activity since then.    She does have home oxygen from [facility] and uses 3 L/min at bedtime.    She has moderate HERMANN and initial AHI was 25 and she had an intermittent oxygen desaturation on sleep study.  She did not want to pursue sleep PAP study at that time.    Echocardiogram done 3/23 showed moderate aortic stenosis.  Also had at least moderate mitral regurgitation and mild mitral stenosis.  Right ventricular systolic pressure was elevated at 50 to 60 mmHg.  She had severe left atrial dilatation and left ventricular ejection fraction was 55% with septal dyskinesis.    She is going to schedule echocardiogram.  She will do at [facility]    Patient contacted Cardiology, Dr. Cedeño, yesterday after feeling her AICD buzzing. She was told to replace the battery, which is scheduled in May. Placed in 2017 at [place].    Difficulty sleeping and using the oxygen very rarely.        Past Medical History:   Diagnosis Date    Coronary artery disease     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HCC)     bullous    Hypertension     Obesity     Sarcoidosis     SOB (shortness of breath)        Past Surgical History:   Procedure Laterality Date    CARDIAC SURGERY  06/02/2017    CATARACT EXTRACTION Left 07/28/2019    CYST REMOVAL      Lipoma    DENTAL SURGERY   12/2023    DILATION AND CURETTAGE OF UTERUS      EYE SURGERY      INSERT / REPLACE / REMOVE PACEMAKER  12/20/2017    IR BIOPSY LYMPH NODE  08/11/2023    OVARIAN CYST REMOVAL           Current Outpatient Medications:     Acetaminophen 80 MG TBDP, Take by mouth if needed, Disp: , Rfl:     apixaban (ELIQUIS) 5 mg, Take 5 mg by mouth 2 (two) times a day 6/2/2022 stopped on 5/31/2022 for Procedure, Disp: , Rfl:     aspirin (ECOTRIN LOW STRENGTH) 81 mg EC tablet, Take 81 mg by mouth if needed, Disp: , Rfl:     atorvastatin (LIPITOR) 10 mg tablet, take 1 tablet by mouth once daily, Disp: 90 tablet, Rfl: 1    Cyanocobalamin (Vitamin B 12) 100 MCG LOZG, Take 5,000 mcg by mouth in the morning, Disp: , Rfl:     Entresto 49-51 MG TABS, Take 1 tablet by mouth 2 (two) times a day, Disp: , Rfl:     glucosamine-chondroitin 500-400 MG tablet, Take 1 tablet by mouth daily , Disp: , Rfl:     levothyroxine 50 mcg tablet, take 1 tablet by mouth once daily, Disp: 90 tablet, Rfl: 2    metoprolol succinate (TOPROL-XL) 50 mg 24 hr tablet, take 1 tablet by mouth once daily, Disp: 90 tablet, Rfl: 2    Multiple Vitamins-Minerals (HAIR SKIN AND NAILS FORMULA PO), Take by mouth, Disp: , Rfl:     spironolactone (ALDACTONE) 25 mg tablet, Take 25 mg by mouth daily, Disp: , Rfl:     torsemide (DEMADEX) 20 mg tablet, Take 20 mg by mouth daily, Disp: , Rfl:     amoxicillin (AMOXIL) 500 mg capsule, take 1 capsule by mouth three times a day for 7 days (Patient not taking: Reported on 9/14/2023), Disp: , Rfl:     hydrocortisone (ANUSOL-HC) 2.5 % rectal cream, Apply topically 2 (two) times a day (Patient not taking: Reported on 2/9/2024), Disp: 30 g, Rfl: 1    mupirocin (BACTROBAN) 2 % ointment, Apply topically 3 (three) times a day (Patient not taking: Reported on 7/11/2023), Disp: 22 g, Rfl: 0    No Known Allergies    Social History     Tobacco Use    Smoking status: Former     Current packs/day: 0.00     Average packs/day: 1.5 packs/day for 31.0 years  "(46.5 ttl pk-yrs)     Types: Cigarettes     Start date:      Quit date:      Years since quittin.2    Smokeless tobacco: Never   Substance Use Topics    Alcohol use: Not Currently     Comment: rare         Family History   Problem Relation Age of Onset    Emphysema Father     Cancer Sister         ovarian    Heart attack Brother     Heart disease Family        Review of Systems   Constitutional:  Negative for chills, fever and unexpected weight change.   HENT:  Negative for congestion, rhinorrhea and sore throat.    Eyes:  Negative for discharge and redness.   Respiratory:  Negative for shortness of breath.    Cardiovascular:  Negative for chest pain, palpitations and leg swelling.   Gastrointestinal:  Negative for abdominal distention, abdominal pain and nausea.   Endocrine: Negative for polydipsia and polyphagia.   Genitourinary:  Negative for dysuria.   Musculoskeletal:  Negative for joint swelling and myalgias.        Has decreased motion right shoulder because of recent fracture to right proximal humeral head.   Skin:  Negative for rash.   Neurological:  Negative for light-headedness.   Psychiatric/Behavioral:  Negative for decreased concentration.        Pulse oximetry testing :    Room air O2 saturation at rest 96% and with ambulating 80 O2 saturation decreased to 93% with heart rate of 100    Vitals:    24 1021   BP: 108/74   Pulse: 71   Resp: 12   Temp: 97.5 °F (36.4 °C)   SpO2: 94%     Height: 4' 10\" (147.3 cm)  IBW (Ideal Body Weight): 40.9 kg  Body mass index is 42.93 kg/m².  Weight (last 2 days)       Date/Time Weight    24 1021 93.2 (205.4)                Physical Exam  Vitals reviewed.   Constitutional:       General: She is not in acute distress.     Appearance: Normal appearance. She is well-developed. She is obese.   HENT:      Head: Normocephalic.      Right Ear: External ear normal.      Left Ear: External ear normal.      Nose: Nose normal.      Mouth/Throat:      Mouth: " Mucous membranes are moist.      Pharynx: Oropharynx is clear. No oropharyngeal exudate.   Eyes:      Conjunctiva/sclera: Conjunctivae normal.      Pupils: Pupils are equal, round, and reactive to light.   Cardiovascular:      Rate and Rhythm: Normal rate and regular rhythm.      Heart sounds: Normal heart sounds.   Pulmonary:      Effort: Pulmonary effort is normal. No respiratory distress.      Breath sounds: Normal breath sounds. No stridor. No wheezing, rhonchi or rales.   Chest:      Chest wall: No tenderness.   Abdominal:      General: There is no distension.      Palpations: Abdomen is soft.      Tenderness: There is no abdominal tenderness.   Musculoskeletal:      Cervical back: Neck supple.      Right lower leg: Edema (trace) present.      Left lower leg: Edema (trace) present.   Lymphadenopathy:      Cervical: No cervical adenopathy.   Skin:     General: Skin is warm and dry.   Neurological:      General: No focal deficit present.      Mental Status: She is alert and oriented to person, place, and time.   Psychiatric:         Mood and Affect: Mood normal.         Behavior: Behavior normal.         Thought Content: Thought content normal.

## 2024-03-28 ENCOUNTER — EVALUATION (OUTPATIENT)
Dept: PHYSICAL THERAPY | Facility: CLINIC | Age: 79
End: 2024-03-28
Payer: COMMERCIAL

## 2024-03-28 DIAGNOSIS — S42.291D CLOSED 4-PART FRACTURE OF PROXIMAL END OF RIGHT HUMERUS WITH ROUTINE HEALING, SUBSEQUENT ENCOUNTER: ICD-10-CM

## 2024-03-28 PROCEDURE — 97161 PT EVAL LOW COMPLEX 20 MIN: CPT | Performed by: PHYSICAL THERAPIST

## 2024-03-28 NOTE — PROGRESS NOTES
PT Evaluation     Today's date: 3/28/2024  Patient name: Dianelys Kim  : 1945  MRN: [id number]  Referring provider: Raudel Thornton MD  Dx:   Encounter Diagnosis     ICD-10-CM    1. Closed 4-part fracture of proximal end of right humerus with routine healing, subsequent encounter  S42.291D Ambulatory Referral to Physical Therapy                     Assessment  Assessment details: Dianelys Kimpresents with signs and symptoms consistent with Closed 4-part fracture of proximal end of right humerus with routine healing, subsequent encounter, with loss of range of motion, strength and spinal stabilization.  Presents with high reactivity.  Dianelys Kim would benefit with physical therapy to address these impairments to return to prior level of function.     Impairments: abnormal or restricted ROM, activity intolerance, impaired physical strength, lacks appropriate home exercise program, pain with function, weight-bearing intolerance and poor posture     Goals  STG  Initiate HEP  Decrease pain by 50% in 3 weeks  Patient performing HEP 50% of time in 3 weeks  LTG  Independent with HEP  Decrease pain by 90% in 6 weeks  Patient performing HEP 90% of time in 6 weeks  FOTO > 50    in 6 weeks     Plan  Planned therapy interventions: manual therapy, neuromuscular re-education, patient education, postural training, strengthening, stretching, therapeutic exercise and home exercise program  Frequency: 2x week  Duration in visits: 12  Duration in weeks: 6  Treatment plan discussed with: patient        Subjective Evaluation    History of Present Illness  Mechanism of injury: Patient reports falling 24 and landing on her right shoulder.  She wore a sling for 3 weeks, and due to forearm  pain she stopped wearing the sling.  She is not a candidate for surgery, so she is here to optimize the function of the right shoulder, her dominate arm.            Recurrent probem    Quality of life: good    Patient  Goals  Patient goals for therapy: decreased pain, improved balance, increased motion, increased strength, independence with ADLs/IADLs and return to sport/leisure activities    Pain  Current pain ratin  At best pain ratin  At worst pain ratin  Location: right shoulder  Quality: needle-like, knife-like and dull ache  Relieving factors: rest  Aggravating factors: lifting    Treatments  Current treatment: physical therapy    Objective     Postural Observations  Seated posture: poor  Standing posture: poor  Correction of posture: makes symptoms better      Active Range of Motion   Left Shoulder   Flexion: 170 degrees   Abduction: 170 degrees   External rotation 0°: 70 degrees     Right Shoulder   Flexion: 30 degrees with pain  Abduction: 20 degrees with pain  External rotation 0°: 20 degrees with pain    Passive Range of Motion   Left Shoulder   Normal passive range of motion    Right Shoulder   Flexion: 70 degrees with pain  Abduction: 50 degrees with pain  External rotation 0°: 30 degrees with pain    Strength/Myotome Testing     Left Shoulder     Planes of Motion   Flexion: 5   Abduction: 5   External rotation at 0°: 5     Right Shoulder     Planes of Motion   Flexion: 3-   Abduction: 3-   External rotation at 0°: 3-       Flowsheet Rows      Flowsheet Row Most Recent Value   PT/OT G-Codes    Current Score 4   Projected Score 50   FOTO information reviewed Yes   Assessment Type Evaluation               Precautions: Medical History    Diagnosis Date Comment Source   Coronary artery disease      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HCC)  bullous    Hypertension      Obesity      Sarcoidosis      SOB (shortness of breath)            Manuals                                                                 Neuro Re-Ed                                                                                                        Ther Ex 3/28            Elbow AROM Flex/Sup/Pron 3x10            Pendulum 10x            Table top slide  flexion 10x            FIS             Pulleys             AAROM                                       Ther Activity                                       Gait Training                                       Modalities                                          Patient instructed in HEP from [facility] # [id number]

## 2024-03-30 NOTE — ASSESSMENT & PLAN NOTE
She does have sleep-related hypoxemia which could be in part due to alveolar hypoventilation from obesity.  She also has moderate HERMANN with AHI of 25 but not 1 of pursue CPAP therapy.  She does have oxygen at home that she is supposed to be using 3 L/min at bedtime but has not been using recently.  I will order room air O2 saturation read to me and [place]iloh to assess to see if she has any significant oxygen desaturation during sleep without using her oxygen

## 2024-03-30 NOTE — ASSESSMENT & PLAN NOTE
I reviewed results of echocardiogram done March 2023.  This showed LV systolic function to be normal however right ventricular systolic pressure was elevated at 50 to 60 mL mercury.  She also had evidence of at least moderate mitral regurgitation and mild mitral stenosis and moderate aortic stenosis.  I did tell her to check with her cardiologist at [place] about whether he would want to do a follow-up echocardiogram later this year to reassess her valvular heart disease and pulmonary artery hypertension

## 2024-03-30 NOTE — ASSESSMENT & PLAN NOTE
Dianelys was diagnosed with sarcoidosis in 2014 by right cervical lymph node biopsy done at [facility].  She has no evidence of any interstitial lung disease.  She does have some mild mediastinal and bilateral hilar adenopathy.    Not having any problem secondary to sarcoidosis at this time.

## 2024-04-01 ENCOUNTER — OFFICE VISIT (OUTPATIENT)
Dept: PHYSICAL THERAPY | Facility: CLINIC | Age: 79
End: 2024-04-01
Payer: COMMERCIAL

## 2024-04-01 DIAGNOSIS — S42.291D CLOSED 4-PART FRACTURE OF PROXIMAL END OF RIGHT HUMERUS WITH ROUTINE HEALING, SUBSEQUENT ENCOUNTER: Primary | ICD-10-CM

## 2024-04-01 PROCEDURE — 97140 MANUAL THERAPY 1/> REGIONS: CPT

## 2024-04-01 PROCEDURE — 97110 THERAPEUTIC EXERCISES: CPT

## 2024-04-01 NOTE — PROGRESS NOTES
Daily Note     Today's date: 2024  Patient name: Dianelys Kim  : 1945  MRN: [id number]  Referring provider: Raudel Thornton MD  Dx:   Encounter Diagnosis     ICD-10-CM    1. Closed 4-part fracture of proximal end of right humerus with routine healing, subsequent encounter  S42.291D           Start Time: 1050          Subjective: I had a lot of pain from my R shoulder up to my neck & down my R leg yesterday. I was sitting eating dinner when pain occurred.       Objective: See treatment diary below      Assessment: Tolerated treatment fair. Patient demonstrated fatigue post treatment and would benefit from continued PT  Patients daughter present during session.   Patient ambulates using SPC   Plan: Continue per plan of care.      Precautions: Medical History    Diagnosis Date Comment Source   Coronary artery disease      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HCC)  bullous    Hypertension      Obesity      Sarcoidosis      SOB (shortness of breath)            Manuals  2024             R shoulder  AA/PROM (HOB elevated) as tolerated                                                  Neuro Re-Ed                                                                                           Seated scap retractions   10x            Ther Ex 3/28            Elbow AROM Flex/Sup/Pron 3x10 10x each-    Sup/pron holding red flebar           Pendulum 10x HEP           Table top slide flexion 10x HEP           FIS  20x           Pulleys  10x           AAROM  ----                                     Ther Activity                                       Gait Training                                       Modalities               MH pre/post R shoulder

## 2024-04-04 ENCOUNTER — OFFICE VISIT (OUTPATIENT)
Dept: PHYSICAL THERAPY | Facility: CLINIC | Age: 79
End: 2024-04-04
Payer: COMMERCIAL

## 2024-04-04 DIAGNOSIS — S42.291D CLOSED 4-PART FRACTURE OF PROXIMAL END OF RIGHT HUMERUS WITH ROUTINE HEALING, SUBSEQUENT ENCOUNTER: Primary | ICD-10-CM

## 2024-04-04 PROCEDURE — 97110 THERAPEUTIC EXERCISES: CPT | Performed by: PHYSICAL THERAPIST

## 2024-04-04 PROCEDURE — 97140 MANUAL THERAPY 1/> REGIONS: CPT | Performed by: PHYSICAL THERAPIST

## 2024-04-04 NOTE — PROGRESS NOTES
Daily Note     Today's date: 2024  Patient name: Dianelys Kim  : 1945  MRN: [id number]  Referring provider: Raudel Thornton MD  Dx:   Encounter Diagnosis     ICD-10-CM    1. Closed 4-part fracture of proximal end of right humerus with routine healing, subsequent encounter  S42.291D                      Subjective:  get pain with bending elbow.      Objective: See treatment diary below      Assessment: Tolerated treatment well. Patient demonstrated fatigue post treatment and exhibited good technique with therapeutic exercises      Plan: Continue per plan of care.      Precautions: Medical History    Diagnosis Date Comment Source   Coronary artery disease      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HCC)  bullous    Hypertension      Obesity      Sarcoidosis      SOB (shortness of breath)            Manuals  2024            R shoulder  AA/PROM (HOB elevated) as tolerated AAROM                                                 Neuro Re-Ed                                                                                           Seated scap retractions   10x  20x          Ther Ex 3/28            Elbow AROM Flex/Sup/Pron 3x10 10x each-    Sup/pron holding red flebar 20x          Pendulum 10x HEP 20          Table top slide flexion 10x HEP 20x          FIS  20x 20x          Pulleys  10x           AAROM  ----                                     Ther Activity                                       Gait Training                                       Modalities               MH pre/post R shoulder

## 2024-04-08 ENCOUNTER — TELEPHONE (OUTPATIENT)
Dept: NEPHROLOGY | Facility: CLINIC | Age: 79
End: 2024-04-08

## 2024-04-08 ENCOUNTER — OFFICE VISIT (OUTPATIENT)
Dept: PHYSICAL THERAPY | Facility: CLINIC | Age: 79
End: 2024-04-08
Payer: COMMERCIAL

## 2024-04-08 DIAGNOSIS — S42.291D CLOSED 4-PART FRACTURE OF PROXIMAL END OF RIGHT HUMERUS WITH ROUTINE HEALING, SUBSEQUENT ENCOUNTER: Primary | ICD-10-CM

## 2024-04-08 PROCEDURE — 97140 MANUAL THERAPY 1/> REGIONS: CPT | Performed by: PHYSICAL THERAPIST

## 2024-04-08 PROCEDURE — 97110 THERAPEUTIC EXERCISES: CPT | Performed by: PHYSICAL THERAPIST

## 2024-04-08 NOTE — TELEPHONE ENCOUNTER
LM for patient reminding them to go for lab work before their appt on 4/15.  Asked them to call back if they have any questions.

## 2024-04-08 NOTE — PROGRESS NOTES
Daily Note     Today's date: 2024  Patient name: Dianelys Kim  : 1945  MRN: [id number]  Referring provider: Raudel Thornton MD  Dx:   Encounter Diagnosis     ICD-10-CM    1. Closed 4-part fracture of proximal end of right humerus with routine healing, subsequent encounter  S42.291D                      Subjective: I still have muscle pain.      Objective: See treatment diary below      Assessment: Tolerated treatment well. Patient demonstrated fatigue post treatment and exhibited good technique with therapeutic exercises      Plan: Continue per plan of care.      Precautions: Medical History    Diagnosis Date Comment Source   Coronary artery disease      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HCC)  bullous    Hypertension      Obesity      Sarcoidosis      SOB (shortness of breath)            Manuals  2024           R shoulder  AA/PROM (HOB elevated) as tolerated AAROM PROM                                                Neuro Re-Ed                                                                                           Seated scap retractions   10x  20x 20x         Ther Ex 3/28            Elbow AROM Flex/Sup/Pron 3x10 10x each-    Sup/pron holding red flebar 20x 20x         Pendulum 10x HEP 20 20x         Table top slide flexion 10x HEP 20x 20x         FIS  20x 20x 20x         Pulleys  10x  20x         AAROM  ----  20x                                   Ther Activity                                       Gait Training                                       Modalities               MH pre/post R shoulder

## 2024-04-10 ENCOUNTER — OFFICE VISIT (OUTPATIENT)
Dept: OBGYN CLINIC | Facility: CLINIC | Age: 79
End: 2024-04-10

## 2024-04-10 ENCOUNTER — APPOINTMENT (OUTPATIENT)
Dept: RADIOLOGY | Facility: CLINIC | Age: 79
End: 2024-04-10
Payer: COMMERCIAL

## 2024-04-10 VITALS
WEIGHT: 205 LBS | BODY MASS INDEX: 43.03 KG/M2 | HEIGHT: 58 IN | DIASTOLIC BLOOD PRESSURE: 73 MMHG | HEART RATE: 74 BPM | SYSTOLIC BLOOD PRESSURE: 127 MMHG

## 2024-04-10 DIAGNOSIS — S42.291D CLOSED 4-PART FRACTURE OF PROXIMAL END OF RIGHT HUMERUS WITH ROUTINE HEALING, SUBSEQUENT ENCOUNTER: Primary | ICD-10-CM

## 2024-04-10 DIAGNOSIS — S42.291D CLOSED 4-PART FRACTURE OF PROXIMAL END OF RIGHT HUMERUS WITH ROUTINE HEALING, SUBSEQUENT ENCOUNTER: ICD-10-CM

## 2024-04-10 PROCEDURE — 99024 POSTOP FOLLOW-UP VISIT: CPT | Performed by: ORTHOPAEDIC SURGERY

## 2024-04-10 PROCEDURE — 73030 X-RAY EXAM OF SHOULDER: CPT

## 2024-04-10 RX ORDER — METHYLPREDNISOLONE 4 MG
TABLET, DOSE PACK ORAL DAILY
COMMUNITY

## 2024-04-10 NOTE — PROGRESS NOTES
Assessment/Plan:  1. Closed 4-part fracture of proximal end of right humerus with routine healing, subsequent encounter  XR shoulder 2+ vw right        The patient is making progress with her shoulder and will continue physical therapy. She can continue to do gentle ADLs, but should still avoid any heavy lifting. She will FU in 6 weeks for repeat evaluation. New xrays will be taken at that appointment.     Subjective:   Dianelys Kim is a 78 y.o. female who presents today for follow-up of her right shoulder, now over 2 months status post closed treatment of proximal humerus fracture. She has started PT and notes slow progress with ROM. She still gets some pain about the shoulder with activity , but denies any pain at rest.       Review of Systems   Constitutional: Negative.  Negative for chills and fever.   HENT: Negative.  Negative for ear pain and sore throat.    Eyes: Negative.  Negative for pain and redness.   Respiratory: Negative.  Negative for shortness of breath and wheezing.    Cardiovascular:  Negative for chest pain and palpitations.   Gastrointestinal: Negative.  Negative for abdominal pain and blood in stool.   Endocrine: Negative.  Negative for polydipsia and polyuria.   Genitourinary: Negative.  Negative for difficulty urinating and dysuria.   Musculoskeletal:         As noted in HPI   Skin: Negative.  Negative for pallor and rash.   Neurological: Negative.  Negative for dizziness and numbness.   Hematological: Negative.  Negative for adenopathy. Does not bruise/bleed easily.   Psychiatric/Behavioral: Negative.  Negative for confusion and suicidal ideas.          Past Medical History:   Diagnosis Date   • Coronary artery disease    • 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HCC)     bullous   • Hypertension    • Obesity    • Sarcoidosis    • SOB (shortness of breath)        Past Surgical History:   Procedure Laterality Date   • CARDIAC SURGERY  06/02/2017   • CATARACT EXTRACTION Left 07/28/2019   • CYST REMOVAL      Lipoma    • DENTAL SURGERY  2023   • DILATION AND CURETTAGE OF UTERUS     • EYE SURGERY     • INSERT / REPLACE / REMOVE PACEMAKER  2017   • IR BIOPSY LYMPH NODE  2023   • OVARIAN CYST REMOVAL         Family History   Problem Relation Age of Onset   • Emphysema Father    • Cancer Sister         ovarian   • Heart attack Brother    • Heart disease Family        Social History     Occupational History   • Not on file   Tobacco Use   • Smoking status: Former     Current packs/day: 0.00     Average packs/day: 1.5 packs/day for 31.0 years (46.5 ttl pk-yrs)     Types: Cigarettes     Start date:      Quit date:      Years since quittin.3   • Smokeless tobacco: Never   Vaping Use   • Vaping status: Never Used   Substance and Sexual Activity   • Alcohol use: Not Currently     Comment: rare   • Drug use: No   • Sexual activity: Not on file         Current Outpatient Medications:   •  Acetaminophen 80 MG TBDP, Take by mouth if needed, Disp: , Rfl:   •  apixaban (ELIQUIS) 5 mg, Take 5 mg by mouth 2 (two) times a day 2022 stopped on 2022 for Procedure, Disp: , Rfl:   •  aspirin (ECOTRIN LOW STRENGTH) 81 mg EC tablet, Take 81 mg by mouth if needed, Disp: , Rfl:   •  atorvastatin (LIPITOR) 10 mg tablet, take 1 tablet by mouth once daily, Disp: 90 tablet, Rfl: 1  •  Cyanocobalamin (Vitamin B 12) 100 MCG LOZG, Take 5,000 mcg by mouth in the morning, Disp: , Rfl:   •  Entresto 49-51 MG TABS, Take 1 tablet by mouth 2 (two) times a day, Disp: , Rfl:   •  Glucosamine Sulfate 500 MG TABS, Take by mouth daily, Disp: , Rfl:   •  glucosamine-chondroitin 500-400 MG tablet, Take 1 tablet by mouth daily , Disp: , Rfl:   •  levothyroxine 50 mcg tablet, take 1 tablet by mouth once daily, Disp: 90 tablet, Rfl: 2  •  metoprolol succinate (TOPROL-XL) 50 mg 24 hr tablet, take 1 tablet by mouth once daily, Disp: 90 tablet, Rfl: 2  •  Multiple Vitamins-Minerals (HAIR SKIN AND NAILS FORMULA PO), Take by mouth, Disp: , Rfl:    •  spironolactone (ALDACTONE) 25 mg tablet, Take 25 mg by mouth daily, Disp: , Rfl:   •  torsemide (DEMADEX) 20 mg tablet, Take 20 mg by mouth daily, Disp: , Rfl:   •  amoxicillin (AMOXIL) 500 mg capsule, take 1 capsule by mouth three times a day for 7 days (Patient not taking: Reported on 9/14/2023), Disp: , Rfl:   •  hydrocortisone (ANUSOL-HC) 2.5 % rectal cream, Apply topically 2 (two) times a day (Patient not taking: Reported on 2/9/2024), Disp: 30 g, Rfl: 1  •  mupirocin (BACTROBAN) 2 % ointment, Apply topically 3 (three) times a day (Patient not taking: Reported on 7/11/2023), Disp: 22 g, Rfl: 0    No Known Allergies    Objective:  Vitals:    04/10/24 1130   BP: 127/73   Pulse: 74     Pain Score: 0-No pain      Right Shoulder Exam     Tenderness   The patient is experiencing no tenderness.    Range of Motion   External rotation:  0   Forward flexion:  60     Other   Erythema: absent  Sensation: normal  Pulse: present            Physical Exam  Constitutional:       General: She is not in acute distress.     Appearance: She is well-developed.   HENT:      Head: Normocephalic and atraumatic.   Eyes:      General: No scleral icterus.     Conjunctiva/sclera: Conjunctivae normal.   Neck:      Vascular: No JVD.   Cardiovascular:      Rate and Rhythm: Normal rate.   Pulmonary:      Effort: Pulmonary effort is normal. No respiratory distress.   Musculoskeletal:      Comments: As per HPI   Skin:     General: Skin is warm.   Neurological:      Mental Status: She is alert and oriented to person, place, and time.      Coordination: Coordination normal.         I have personally reviewed pertinent films in PACS and my interpretation is as follows:  Xrays right shoulder: Stable, displaced but healing proximal humerus fracture.       This document was created using speech voice recognition software.   Grammatical errors, random word insertions, pronoun errors, and incomplete sentences are an occasional consequence of this  system due to software limitations, ambient noise, and hardware issues.   Any formal questions or concerns about content, text, or information contained within the body of this dictation should be directly addressed to the provider for clarification.

## 2024-04-11 ENCOUNTER — OFFICE VISIT (OUTPATIENT)
Dept: PHYSICAL THERAPY | Facility: CLINIC | Age: 79
End: 2024-04-11
Payer: COMMERCIAL

## 2024-04-11 DIAGNOSIS — S42.291D CLOSED 4-PART FRACTURE OF PROXIMAL END OF RIGHT HUMERUS WITH ROUTINE HEALING, SUBSEQUENT ENCOUNTER: Primary | ICD-10-CM

## 2024-04-11 PROCEDURE — 97110 THERAPEUTIC EXERCISES: CPT

## 2024-04-11 PROCEDURE — 97140 MANUAL THERAPY 1/> REGIONS: CPT

## 2024-04-11 NOTE — PROGRESS NOTES
Daily Note     Today's date: 2024  Patient name: Dianelys Kim  : 1945  MRN: [id number]  Referring provider: Raudel Thornton MD  Dx:   Encounter Diagnosis     ICD-10-CM    1. Closed 4-part fracture of proximal end of right humerus with routine healing, subsequent encounter  S42.291D                      Subjective: I saw the Dr & he said I'm progressing well. I bought my own pulleys & use them every day.      Objective: See treatment diary below      Assessment: Tolerated treatment fair. Patient demonstrated fatigue post treatment, exhibited good technique with therapeutic exercises, and would benefit from continued PT    Patient ambulates using SPC in R hand.  Plan: Continue per plan of care.      Precautions: Medical History    Diagnosis Date Comment Source   Coronary artery disease      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HCC)  bullous    Hypertension      Obesity      Sarcoidosis      SOB (shortness of breath)            Manuals  2024          R shoulder  AA/PROM (HOB elevated) as tolerated AAROM PROM AA/PROM                                               Neuro Re-Ed                                                                                           Seated scap retractions   10x  20x 20x 20x 5 sec hold         Ther Ex 3/28            Elbow AROM Flex/Sup/Pron 3x10 10x each-    Sup/pron holding red flebar 20x 20x 20x each         Pendulum 10x HEP 20 20x HEP        Table top slide flexion 10x HEP 20x 20x HEP        FIS  20x 20x 20x 20x        Pulleys  10x  20x 20x        AAROM  ----  20x --             R gripper #10 x 20 reps             Seated red ball IR isometric squeeze 20 x 5 sec hold         Ther Activity                                       Gait Training                                       Modalities               MH pre/post R shoulder   MH pre/post R shoulder (seated)

## 2024-04-11 NOTE — PROGRESS NOTES
NEPHROLOGY OFFICE VISIT   Dianelys Kim 78 y.o. female MRN: [id number]  4/15/2024    Reason for Visit: Follow up    INTERVAL HISTORY and SUBJECTIVE:    I had the pleasure of seeing Dianelys today in the renal clinic.  She is 78-year-old female who is followed by Dr. Childers for management of CKD.  She was last seen in the nephrology clinic January 2024 by this provider.  Fell in January and broke right shoulder/nose- still has shoulder pain. Not using NSAIDS.  Uses Tylenol for pain control    Last labs obtained on 2/12/2024:    ASSESSMENT AND PLAN:    Chronic kidney disease: Likely stage IIIB.  No proteinuria  Baseline creatinine: 1.4 to 1.7 mg/dL.  GFR near 27-32.  Last creatinine 1.52.  eGFR 32  Urinalysis: Negative protein, negative blood, no RBCs, no leuks.  UACR less than 15  July 2023 Cystatin C 2.33 with a GFR of 22.  At that time serum creatinine 1.69 with a GFR of 28.  No obstruction on imaging  Renal ultrasound: Normal echogenicity and contour.  Kidneys symmetric and normal size.  Left kidney: Echogenic foci which may represent intrarenal calculi versus vascular calcifications  Patient previously discussed use of SGLT2 inhibitor with her primary care physician.  No change in renal function over the last year.  No proteinuria     CKD monitoring:   Hemoglobin within normal limits     Essential hypertension:  Medications: Aldactone 25 mg daily, torsemide 20 mg daily, metoprolol succinate 50 mg daily  On medication since 2017  Blood pressure acceptable, no changes    CKD-MBD:  Will recheck labs prior to next appointment  In October 2023 PTH suppressed 8.8.  At that time calcium mildly elevated 10.9.  Phosphorus was noted to be normal at that time.  And vitamin D level 50.1  Last calcium level 9.9.  Monitor     Ischemic cardiomyopathy/pulmonary hypertension:  Echocardiogram March 2023: Ejection fraction improved to 55%.  Mild atrial stenosis, moderate mitral insufficiency.  Moderate aortic stenosis  Status post  AICD/pacemaker  On Entresto, metoprolol, Eliquis, Lipitor, torsemide, Aldactone  HERMANN.  Following with pulmonary but patient does not want to pursue CPAP.     Sarcoidosis:  Follows with pulmonary, Dr. Isidro  Diagnosed in 2014  Presence of mediastinal and hilar adenopathy  No evidence of interstitial lung disease on PET/CT     Oxygen dependency:  Chronic shortness of breath  Oxygen via nasal cannula 2 to 3 L as needed  Currently on room air     Right carotid bruit:   Likely referred due to aortic stenosis    Pacemaker: 5/13 generator change planned     PMH: Hypothyroidism on levothyroxine, panniculitis, anal fissure, COPD/emphysema, HERMANN not using CPAP. Former smoker. History of sarcoidosis, hypertension, hypothyroidism, atrial fibrillation and ischemic cardiomyopathy. Prior CABG. C. difficile January 2024    PATIENT INSTRUCTIONS:    Patient Instructions   You were seen today for management of chronic kidney disease. Kidney function is stable.     Recommendation:  No change in medications  Follow up blood work in 4 months before next visit  As discussed you can take Tylenol for control of mild pain but avoid NSAIDs such as Motrin Aleve ibuprofen and Advil if no  If you have a question about a medication you can always asked the pharmacist or call our office  That way to keep the kidneys as healthy as possible is good control of blood pressure.  If you are diabetic good control of blood sugar and good control of cholesterol/blood lipids  Call with any questions or concerns      Orders Placed This Encounter   Procedures    Albumin / creatinine urine ratio     Standing Status:   Future     Standing Expiration Date:   4/15/2025    CBC     Standing Status:   Future     Standing Expiration Date:   4/15/2025    Magnesium     Standing Status:   Future     Standing Expiration Date:   4/15/2025    PTH, intact     Standing Status:   Future     Standing Expiration Date:   4/15/2025    Renal function panel     Standing Status:   " Future     Standing Expiration Date:   4/15/2025    Urinalysis with microscopic     Standing Status:   Future     Standing Expiration Date:   4/15/2025    Vitamin D 25 hydroxy     Standing Status:   Future     Standing Expiration Date:   4/15/2025       OBJECTIVE:  Current Weight: Weight - Scale: 93.9 kg (207 lb)  Vitals:    04/15/24 1052   BP: 110/68   BP Location: Left arm   Patient Position: Sitting   Cuff Size: Large   Pulse: 78   SpO2: 92%   Weight: 93.9 kg (207 lb)   Height: 4' 10\" (1.473 m)    Body mass index is 43.26 kg/m².      REVIEW OF SYSTEMS:    Review of Systems   Constitutional:  Positive for fatigue. Negative for chills and fever.   HENT:  Negative for congestion, ear pain and sore throat.    Eyes:  Negative for pain and visual disturbance.   Respiratory:  Negative for cough and shortness of breath.    Cardiovascular:  Positive for leg swelling (chronic unilateral, LLE>RLE). Negative for chest pain and palpitations.   Gastrointestinal:  Negative for abdominal pain, constipation, diarrhea, nausea and vomiting.   Genitourinary:  Negative for dysuria and hematuria.   Musculoskeletal:  Positive for arthralgias, gait problem and myalgias. Negative for back pain.        Broke shoulder with subsequent pain   Skin:  Negative for color change and rash.   Neurological:  Negative for dizziness, seizures, syncope, weakness, numbness and headaches.   Hematological:  Bruises/bleeds easily.   Psychiatric/Behavioral:  The patient is not nervous/anxious.    All other systems reviewed and are negative.      PHYSICAL EXAM:      Physical Exam  Constitutional:       General: She is not in acute distress.     Appearance: She is well-developed. She is not ill-appearing or diaphoretic.   HENT:      Head: Normocephalic and atraumatic.      Nose: No congestion.      Mouth/Throat:      Mouth: Mucous membranes are moist.      Pharynx: No oropharyngeal exudate.   Eyes:      General: No scleral icterus.        Right eye: No " discharge.         Left eye: No discharge.      Extraocular Movements: Extraocular movements intact.      Conjunctiva/sclera: Conjunctivae normal.   Neck:      Thyroid: No thyromegaly.      Vascular: No carotid bruit or JVD.      Trachea: No tracheal deviation.   Cardiovascular:      Rate and Rhythm: Normal rate and regular rhythm.      Heart sounds: Murmur heard.      Crescendo systolic murmur is present with a grade of 2/6.      No friction rub. No gallop.   Pulmonary:      Effort: Pulmonary effort is normal. No respiratory distress.      Breath sounds: Normal breath sounds. No stridor. No wheezing, rhonchi or rales.   Abdominal:      General: Bowel sounds are normal. There is no distension.      Palpations: Abdomen is soft. There is no mass.      Tenderness: There is no abdominal tenderness. There is no guarding or rebound.   Musculoskeletal:         General: Tenderness (left shoulder pain) present. No swelling or signs of injury. Normal range of motion.      Cervical back: Normal range of motion and neck supple. No rigidity or tenderness.      Right lower leg: No edema.      Left lower leg: Edema (mild edema--dependent- waxes and wanes) present.   Skin:     General: Skin is warm and dry.      Capillary Refill: Capillary refill takes less than 2 seconds.      Coloration: Skin is not jaundiced or pale.      Findings: Bruising (left shoulder) present. No erythema or rash.   Neurological:      Mental Status: She is alert and oriented to person, place, and time.   Psychiatric:         Mood and Affect: Mood normal.         Behavior: Behavior normal.         Thought Content: Thought content normal.         Judgment: Judgment normal.         Medications:    Current Outpatient Medications:     apixaban (ELIQUIS) 5 mg, Take 5 mg by mouth 2 (two) times a day 6/2/2022 stopped on 5/31/2022 for Procedure, Disp: , Rfl:     atorvastatin (LIPITOR) 10 mg tablet, take 1 tablet by mouth once daily, Disp: 90 tablet, Rfl: 1     "Entresto 49-51 MG TABS, Take 1 tablet by mouth 2 (two) times a day, Disp: , Rfl:     glucosamine-chondroitin 500-400 MG tablet, Take 1 tablet by mouth daily , Disp: , Rfl:     levothyroxine 50 mcg tablet, take 1 tablet by mouth once daily, Disp: 90 tablet, Rfl: 2    metoprolol succinate (TOPROL-XL) 50 mg 24 hr tablet, take 1 tablet by mouth once daily, Disp: 90 tablet, Rfl: 2    oxygen gas, Inhale daily at bedtime, Disp: , Rfl:     spironolactone (ALDACTONE) 25 mg tablet, Take 25 mg by mouth daily, Disp: , Rfl:     torsemide (DEMADEX) 20 mg tablet, Take 20 mg by mouth daily, Disp: , Rfl:     Acetaminophen 80 MG TBDP, Take by mouth if needed, Disp: , Rfl:     amoxicillin (AMOXIL) 500 mg capsule, take 1 capsule by mouth three times a day for 7 days (Patient not taking: Reported on 9/14/2023), Disp: , Rfl:     aspirin (ECOTRIN LOW STRENGTH) 81 mg EC tablet, Take 81 mg by mouth if needed (Patient not taking: Reported on 4/15/2024), Disp: , Rfl:     Cyanocobalamin (Vitamin B 12) 100 MCG LOZG, Take 5,000 mcg by mouth in the morning (Patient not taking: Reported on 4/15/2024), Disp: , Rfl:     Glucosamine Sulfate 500 MG TABS, Take by mouth daily, Disp: , Rfl:     hydrocortisone (ANUSOL-HC) 2.5 % rectal cream, Apply topically 2 (two) times a day (Patient not taking: Reported on 2/9/2024), Disp: 30 g, Rfl: 1    Multiple Vitamins-Minerals (HAIR SKIN AND NAILS FORMULA PO), Take by mouth (Patient not taking: Reported on 4/15/2024), Disp: , Rfl:     mupirocin (BACTROBAN) 2 % ointment, Apply topically 3 (three) times a day (Patient not taking: Reported on 7/11/2023), Disp: 22 g, Rfl: 0    Laboratory Results:        Invalid input(s): \"ALBUMIN\"    Results for orders placed or performed in visit on 03/28/24   Overnight Oximetry Test   Result Value Ref Range    Supplier Name [facility]     Supplier Phone Number [phone]     Order Status Contacting Patient     Delivery Note      Delivery Request Date 03/28/2024     Item Description " Overnight Oximetry Test

## 2024-04-15 ENCOUNTER — OFFICE VISIT (OUTPATIENT)
Dept: NEPHROLOGY | Facility: CLINIC | Age: 79
End: 2024-04-15
Payer: COMMERCIAL

## 2024-04-15 ENCOUNTER — APPOINTMENT (OUTPATIENT)
Dept: PHYSICAL THERAPY | Facility: CLINIC | Age: 79
End: 2024-04-15
Payer: COMMERCIAL

## 2024-04-15 VITALS
DIASTOLIC BLOOD PRESSURE: 68 MMHG | WEIGHT: 207 LBS | SYSTOLIC BLOOD PRESSURE: 110 MMHG | HEIGHT: 58 IN | OXYGEN SATURATION: 92 % | BODY MASS INDEX: 43.45 KG/M2 | HEART RATE: 78 BPM

## 2024-04-15 DIAGNOSIS — I12.9 BENIGN HYPERTENSION WITH CHRONIC KIDNEY DISEASE, STAGE III (HCC): ICD-10-CM

## 2024-04-15 DIAGNOSIS — N18.31 STAGE 3A CHRONIC KIDNEY DISEASE (HCC): ICD-10-CM

## 2024-04-15 DIAGNOSIS — N18.4 CHRONIC RENAL DISEASE, STAGE IV (HCC): Primary | ICD-10-CM

## 2024-04-15 DIAGNOSIS — E87.1 HYPONATREMIA: ICD-10-CM

## 2024-04-15 DIAGNOSIS — R19.8 CHANGE IN BOWEL MOVEMENT: ICD-10-CM

## 2024-04-15 DIAGNOSIS — I25.5 ISCHEMIC CARDIOMYOPATHY: ICD-10-CM

## 2024-04-15 DIAGNOSIS — N18.30 BENIGN HYPERTENSION WITH CHRONIC KIDNEY DISEASE, STAGE III (HCC): ICD-10-CM

## 2024-04-15 DIAGNOSIS — E83.52 HYPERCALCEMIA: ICD-10-CM

## 2024-04-15 DIAGNOSIS — E55.9 VITAMIN D DEFICIENCY: ICD-10-CM

## 2024-04-15 DIAGNOSIS — E87.5 HYPERKALEMIA: ICD-10-CM

## 2024-04-15 PROCEDURE — G2211 COMPLEX E/M VISIT ADD ON: HCPCS | Performed by: NURSE PRACTITIONER

## 2024-04-15 PROCEDURE — 99214 OFFICE O/P EST MOD 30 MIN: CPT | Performed by: NURSE PRACTITIONER

## 2024-04-15 NOTE — PATIENT INSTRUCTIONS
You were seen today for management of chronic kidney disease. Kidney function is stable.     Recommendation:  No change in medications  Follow up blood work in 4 months before next visit  As discussed you can take Tylenol for control of mild pain but avoid NSAIDs such as Motrin Aleve ibuprofen and Advil if no  If you have a question about a medication you can always asked the pharmacist or call our office  That way to keep the kidneys as healthy as possible is good control of blood pressure.  If you are diabetic good control of blood sugar and good control of cholesterol/blood lipids  Call with any questions or concerns

## 2024-04-16 ENCOUNTER — OFFICE VISIT (OUTPATIENT)
Dept: PHYSICAL THERAPY | Facility: CLINIC | Age: 79
End: 2024-04-16
Payer: COMMERCIAL

## 2024-04-16 DIAGNOSIS — S42.291D CLOSED 4-PART FRACTURE OF PROXIMAL END OF RIGHT HUMERUS WITH ROUTINE HEALING, SUBSEQUENT ENCOUNTER: Primary | ICD-10-CM

## 2024-04-16 PROCEDURE — 97140 MANUAL THERAPY 1/> REGIONS: CPT

## 2024-04-16 PROCEDURE — 97110 THERAPEUTIC EXERCISES: CPT

## 2024-04-16 NOTE — PROGRESS NOTES
Daily Note     Today's date: 2024  Patient name: Dianelys Kim  : 1945  MRN: [id number]  Referring provider: Raudel Thornton MD  Dx:   Encounter Diagnosis     ICD-10-CM    1. Closed 4-part fracture of proximal end of right humerus with routine healing, subsequent encounter  S42.291D                      Subjective: My R shoulder is very sore. I am having a cardiac procedure on May 13th in [place].      Objective: See treatment diary below      Assessment: Tolerated treatment fair. Patient demonstrated fatigue post treatment and would benefit from continued PT      Plan: Continue per plan of care.      Precautions: Medical History    Diagnosis Date Comment Source   Coronary artery disease      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HCC)  bullous    Hypertension      Obesity      Sarcoidosis      SOB (shortness of breath)            Manuals  2024          R shoulder  AA/PROM (HOB elevated) as tolerated AAROM PROM AA/PROM                                               Neuro Re-Ed                                                                                           Seated scap retractions   10x  20x 20x 20x 5 sec hold         Ther Ex 3/28            Elbow AROM Flex/Sup/Pron 3x10 10x each-    Sup/pron holding red flebar 20x 20x 20x each         Pendulum 10x HEP 20 20x HEP        Table top slide flexion 10x HEP 20x 20x HEP        FIS  20x 20x 20x 20x        Pulleys  10x  20x 20x        AAROM  ----  20x --             R gripper #10 x 20 reps             Seated red ball IR isometric squeeze 20 x 5 sec hold         Ther Activity                  Seated 3# cane bicep curls x 20 reps                      Gait Training                                       Modalities               MH pre/post R shoulder   MH (pre)R shoulder (seated)

## 2024-04-18 ENCOUNTER — OFFICE VISIT (OUTPATIENT)
Dept: PHYSICAL THERAPY | Facility: CLINIC | Age: 79
End: 2024-04-18
Payer: COMMERCIAL

## 2024-04-18 DIAGNOSIS — S42.291D CLOSED 4-PART FRACTURE OF PROXIMAL END OF RIGHT HUMERUS WITH ROUTINE HEALING, SUBSEQUENT ENCOUNTER: Primary | ICD-10-CM

## 2024-04-18 PROCEDURE — 97140 MANUAL THERAPY 1/> REGIONS: CPT

## 2024-04-18 PROCEDURE — 97110 THERAPEUTIC EXERCISES: CPT

## 2024-04-18 NOTE — PROGRESS NOTES
Daily Note     Today's date: 2024  Patient name: Dianelys Kim  : 1945  MRN: [id number]  Referring provider: Raudel Thornton MD  Dx:   Encounter Diagnosis     ICD-10-CM    1. Closed 4-part fracture of proximal end of right humerus with routine healing, subsequent encounter  S42.291D                      Subjective: Patient arrives in clinic reporting occasion increased R ankle pain.       Objective: See treatment diary below      Assessment: Tolerated treatment fair. Patient demonstrated fatigue post treatment, exhibited good technique with therapeutic exercises, and would benefit from continued PT  Patient arrives ambulating using SPC in R hand; advised use in L hand due to R shoulder / ankle issues.    Plan: Continue per plan of care.      Precautions: Medical History    Diagnosis Date Comment Source   Coronary artery disease      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HCC)  bullous    Hypertension      Obesity      Sarcoidosis      SOB (shortness of breath)            Manuals  2024          R shoulder  AA/PROM (HOB elevated) as tolerated AAROM PROM AA/PROM                                               Neuro Re-Ed                                                                                           Seated scap retractions   10x  20x 20x 20x 5 sec hold         Ther Ex 3/28            Elbow AROM Flex/Sup/Pron 3x10 10x each-    Sup/pron holding red flebar 20x 20x 20x each         Pendulum 10x HEP 20 20x HEP        Table top slide flexion 10x HEP 20x 20x HEP        FIS  20x 20x 20x 20x        Pulleys  10x  20x 20x        AAROM  ----  20x --             R gripper #10 x 20 reps             Seated red ball IR isometric squeeze 20 x 5 sec hold         Ther Activity                  Seated 3# cane bicep curls x 20 reps                      Gait Training                                       Modalities               MH pre/post R shoulder   MH (pre)R shoulder (seated)

## 2024-04-23 ENCOUNTER — OFFICE VISIT (OUTPATIENT)
Dept: PHYSICAL THERAPY | Facility: CLINIC | Age: 79
End: 2024-04-23
Payer: COMMERCIAL

## 2024-04-23 DIAGNOSIS — S42.291D CLOSED 4-PART FRACTURE OF PROXIMAL END OF RIGHT HUMERUS WITH ROUTINE HEALING, SUBSEQUENT ENCOUNTER: Primary | ICD-10-CM

## 2024-04-23 PROCEDURE — 97110 THERAPEUTIC EXERCISES: CPT

## 2024-04-23 PROCEDURE — 97140 MANUAL THERAPY 1/> REGIONS: CPT

## 2024-04-23 NOTE — PROGRESS NOTES
Daily Note     Today's date: 2024  Patient name: Dianelys Kim  : 1945  MRN: [id number]  Referring provider: Raudel Thornton MD  Dx:   Encounter Diagnosis     ICD-10-CM    1. Closed 4-part fracture of proximal end of right humerus with routine healing, subsequent encounter  S42.291D                      Subjective: My R ankle continues to bother me off & on. My R shoulder still hurts.    Objective: See treatment diary below      Assessment: Tolerated treatment fair. Patient demonstrated fatigue post treatment and would benefit from continued PT      Plan: Continue per plan of care.      Precautions: Medical History    Diagnosis Date Comment Source   Coronary artery disease      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HCC)  bullous    Hypertension      Obesity      Sarcoidosis      SOB (shortness of breath)            Manuals  2024         R shoulder  AA/PROM (HOB elevated) as tolerated AAROM PROM AA/PROM AA/PROM                                              Neuro Re-Ed                                                                                           Seated scap retractions   10x  20x 20x 20x 5 sec hold  20x 5 sec hold        Ther Ex 3/28            Elbow AROM Flex/Sup/Pron 3x10 10x each-    Sup/pron holding red flebar 20x 20x 20x each  20x each ; 1# elbow flexion x 20 reps        Pendulum 10x HEP 20 20x HEP        Table top slide flexion 10x HEP 20x 20x HEP        FIS  20x 20x 20x 20x 20x        Pulleys  10x  20x 20x 20x       AAROM  ----  20x --             R gripper #10 x 20 reps R gripper #10 x 20 reps            Seated red ball IR isometric squeeze 20 x 5 sec hold  Seated red ball IR isometric squeeze 20 x 5 sec hold       Ther Activity                  Seated 3# cane bicep curls x 20 reps  Seated 4# cane bicep curls x 20 reps                     Gait Training                                       Modalities               MH pre/post R shoulder   MH (pre)R shoulder (seated)  MH (pre)R shoulder (seated)

## 2024-04-25 ENCOUNTER — OFFICE VISIT (OUTPATIENT)
Dept: PHYSICAL THERAPY | Facility: CLINIC | Age: 79
End: 2024-04-25
Payer: COMMERCIAL

## 2024-04-25 DIAGNOSIS — S42.291D CLOSED 4-PART FRACTURE OF PROXIMAL END OF RIGHT HUMERUS WITH ROUTINE HEALING, SUBSEQUENT ENCOUNTER: Primary | ICD-10-CM

## 2024-04-25 PROCEDURE — 97110 THERAPEUTIC EXERCISES: CPT

## 2024-04-25 PROCEDURE — 97140 MANUAL THERAPY 1/> REGIONS: CPT

## 2024-04-25 NOTE — PROGRESS NOTES
Daily Note     Today's date: 2024  Patient name: Dianelys Kim  : 1945  MRN: [id number]  Referring provider: Raudel Thornton MD  Dx:   Encounter Diagnosis     ICD-10-CM    1. Closed 4-part fracture of proximal end of right humerus with routine healing, subsequent encounter  S42.291D           Start Time: 1014          Subjective: Patient reports experiencing R bicep muscle spasms since trying to use the arm more.       Objective: See treatment diary below      Assessment: Tolerated treatment fair. Patient demonstrated fatigue post treatment, exhibited good technique with therapeutic exercises, and would benefit from continued PT      Plan: Continue per plan of care.      Precautions: Medical History    Diagnosis Date Comment Source   Coronary artery disease      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HCC)  bullous    Hypertension      Obesity      Sarcoidosis      SOB (shortness of breath)            Manuals  2024         R shoulder  AA/PROM (HOB elevated) as tolerated AAROM PROM AA/PROM AA/PROM                                              Neuro Re-Ed                                                                                           Seated scap retractions   10x  20x 20x 20x 5 sec hold  20x 5 sec hold        Ther Ex 3/28            Elbow AROM Flex/Sup/Pron 3x10 10x each-    Sup/pron holding red flebar 20x 20x 20x each  20x each ; 1# elbow flexion x 20 reps        Pendulum 10x HEP 20 20x HEP        Table top slide flexion 10x HEP 20x 20x HEP        FIS  20x 20x 20x 20x 20x        Pulleys  10x  20x 20x 20x       AAROM  ----  20x -- 2x10 reps (hand hold) seated            R gripper #10 x 20 reps R gripper #10 x 20 reps            Seated red ball IR isometric squeeze 20 x 5 sec hold  Seated red ball IR isometric squeeze 20 x 5 sec hold       Ther Activity                  Seated 3# cane bicep curls x 20 reps  Seated 4# cane bicep curls x 20 reps                     Gait Training                                        Modalities               MH pre/post R shoulder   MH (pre)R shoulder (seated) MH (pre)R shoulder (seated)

## 2024-04-30 ENCOUNTER — OFFICE VISIT (OUTPATIENT)
Dept: PHYSICAL THERAPY | Facility: CLINIC | Age: 79
End: 2024-04-30
Payer: COMMERCIAL

## 2024-04-30 DIAGNOSIS — S42.291D CLOSED 4-PART FRACTURE OF PROXIMAL END OF RIGHT HUMERUS WITH ROUTINE HEALING, SUBSEQUENT ENCOUNTER: Primary | ICD-10-CM

## 2024-04-30 PROCEDURE — 97110 THERAPEUTIC EXERCISES: CPT

## 2024-04-30 NOTE — PROGRESS NOTES
Daily Note     Today's date: 2024  Patient name: Dianelys Kim  : 1945  MRN: [id number]  Referring provider: Raudel Thornton MD  Dx:   Encounter Diagnosis     ICD-10-CM    1. Closed 4-part fracture of proximal end of right humerus with routine healing, subsequent encounter  S42.291D                      Subjective: I am thinking of using a pain patch on my shoulder for the pain.      Objective: See treatment diary below      Assessment: Tolerated treatment fair. Patient demonstrated fatigue post treatment, exhibited good technique with therapeutic exercises, and would benefit from continued PT      Plan: Continue per plan of care.      Precautions: Medical History    Diagnosis Date Comment Source   Coronary artery disease      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HCC)  bullous    Hypertension      Obesity      Sarcoidosis      SOB (shortness of breath)            Manuals  2024         R shoulder  AA/PROM (HOB elevated) as tolerated AAROM PROM AA/PROM AA/PROM                                              Neuro Re-Ed                                                                                           Seated scap retractions   10x  20x 20x 20x 5 sec hold  20x 5 sec hold        Ther Ex 3/28            Elbow AROM Flex/Sup/Pron 3x10 10x each-    Sup/pron holding red flebar 20x 20x 20x each  20x each ; 1# elbow flexion x 20 reps        Pendulum 10x HEP 20 20x HEP        Table top slide flexion 10x HEP 20x 20x HEP        FIS  20x 20x 20x 20x 20x        Pulleys  10x  20x 20x 20x       AAROM  ----  20x -- 2x10 reps (hand hold) seated            R gripper #10 x 20 reps R gripper #10 x 20 reps            Seated red ball IR isometric squeeze 20 x 5 sec hold  Seated red ball IR isometric squeeze 20 x 5 sec hold       Ther Activity                  Seated 3# cane bicep curls x 20 reps  Seated 4# cane bicep curls x 20 reps                     Gait Training                                        Modalities               MH pre/post R shoulder   MH (pre)R shoulder (seated) MH (pre)R shoulder (seated)

## 2024-05-02 ENCOUNTER — OFFICE VISIT (OUTPATIENT)
Dept: PHYSICAL THERAPY | Facility: CLINIC | Age: 79
End: 2024-05-02
Payer: COMMERCIAL

## 2024-05-02 ENCOUNTER — OFFICE VISIT (OUTPATIENT)
Dept: FAMILY MEDICINE CLINIC | Facility: CLINIC | Age: 79
End: 2024-05-02
Payer: COMMERCIAL

## 2024-05-02 VITALS
SYSTOLIC BLOOD PRESSURE: 110 MMHG | TEMPERATURE: 98.2 F | HEART RATE: 93 BPM | BODY MASS INDEX: 43.26 KG/M2 | OXYGEN SATURATION: 92 % | WEIGHT: 207 LBS | DIASTOLIC BLOOD PRESSURE: 64 MMHG | RESPIRATION RATE: 20 BRPM

## 2024-05-02 DIAGNOSIS — S42.291D CLOSED 4-PART FRACTURE OF PROXIMAL END OF RIGHT HUMERUS WITH ROUTINE HEALING, SUBSEQUENT ENCOUNTER: Primary | ICD-10-CM

## 2024-05-02 DIAGNOSIS — I25.5 ISCHEMIC CARDIOMYOPATHY: ICD-10-CM

## 2024-05-02 DIAGNOSIS — E03.9 ACQUIRED HYPOTHYROIDISM: ICD-10-CM

## 2024-05-02 DIAGNOSIS — G47.34 HYPOXIA, SLEEP RELATED: ICD-10-CM

## 2024-05-02 PROCEDURE — 99214 OFFICE O/P EST MOD 30 MIN: CPT | Performed by: FAMILY MEDICINE

## 2024-05-02 PROCEDURE — G2211 COMPLEX E/M VISIT ADD ON: HCPCS | Performed by: FAMILY MEDICINE

## 2024-05-02 PROCEDURE — 97110 THERAPEUTIC EXERCISES: CPT

## 2024-05-02 NOTE — PROGRESS NOTES
Daily Note     Today's date: 2024  Patient name: Dianelys Kim  : 1945  MRN: [id number]  Referring provider: Raudel Thornton MD  Dx:   Encounter Diagnosis     ICD-10-CM    1. Closed 4-part fracture of proximal end of right humerus with routine healing, subsequent encounter  S42.291D           Start Time: 1015          Subjective: Sometimes I just don't do the exercises at home because I'm too tired.       Objective: See treatment diary below      Assessment: Tolerated treatment fair. Patient demonstrated fatigue post treatment, exhibited good technique with therapeutic exercises, and would benefit from continued PT      Plan: Continue per plan of care.      Precautions: Medical History    Diagnosis Date Comment Source   Coronary artery disease      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HCC)  bullous    Hypertension      Obesity      Sarcoidosis      SOB (shortness of breath)            Manuals  2024         R shoulder  AA/PROM (HOB elevated) as tolerated AAROM PROM AA/PROM AA/PROM                                              Neuro Re-Ed                                                                                           Seated scap retractions   10x  20x 20x 20x 5 sec hold  20x 5 sec hold        Ther Ex 3/28            Elbow AROM Flex/Sup/Pron 3x10 10x each-    Sup/pron holding red flebar 20x 20x 20x each  20x each ; 1# elbow flexion x 20 reps        Pendulum 10x HEP 20 20x HEP        Table top slide flexion 10x HEP 20x 20x HEP        FIS  20x 20x 20x 20x 20x        Pulleys  10x  20x 20x 20x       AAROM  ----  20x -- 2x10 reps (hand hold) seated            R gripper #10 x 20 reps R gripper #10 x 20 reps            Seated red ball IR isometric squeeze 20 x 5 sec hold  Seated red ball IR isometric squeeze 20 x 5 sec hold       Ther Activity                  Seated 3# cane bicep curls x 20 reps  Seated 4# cane bicep curls x 20 reps                     Gait Training                                        Modalities               MH pre/post R shoulder   MH (pre)R shoulder (seated) MH (pre)R shoulder (seated)

## 2024-05-02 NOTE — PROGRESS NOTES
Dianelys Kim 1945 female MRN: [id number]    [facility] PRACTICE OFFICE VISIT  [facility] - [facility]      ASSESSMENT/PLAN  Dianelys Kim is a 78 y.o. female presents to the office for    Diagnoses and all orders for this visit:    Closed 4-part fracture of proximal end of right humerus with routine healing, subsequent encounter    Ischemic cardiomyopathy    Acquired hypothyroidism  -     TSH, 3rd generation with Free T4 reflex; Future    Hypoxia, sleep related    Very concerned about the patient having hypoxia during ambulation.  Patient undergoing a full cardiac workup by her cardiologist.  Did advise the patient that I do believe that it is cardiac that is causing these concerns will be CCed and results  Hypothyroidism continue levothyroxine 50 mcg daily  Repeat TSH  Continue PT recommend after cardiac workup if normal then recommend patient to attempt physical therapy for deconditioning             Future Appointments   Date Time Provider Department Center   5/6/2024 10:15 AM Sihrley Blanco PTA [place] PT [facility]   5/24/2024 10:00 AM Raudel Thornton MD [facility]   8/20/2024  9:30 AM LILLIANA Gonzalez [facility]   11/4/2024 11:00 AM Jaye Curry MD [facility]          SUBJECTIVE  CC: Follow-up      HPI:  Dianelys Kim is a 78 y.o. female who presents for follow-up appointment.  Patient was unable to fully ambulate to the weight machine given shortness of breath.  States that she has been feeling very short of breath recently.  States that the cardiologist has been working her up.  States that she has been taking her thyroid medications as prescribed.  Feels that she has been declined ever since her fracture.  PT but states that its mostly sitting down  Review of Systems   Constitutional:  Positive for activity change and fatigue. Negative for appetite change, chills and fever.   HENT:  Negative for congestion.    Respiratory:   Positive for shortness of breath. Negative for cough and chest tightness.    Cardiovascular:  Negative for chest pain and leg swelling.   Gastrointestinal:  Negative for abdominal distention, abdominal pain, constipation, diarrhea, nausea and vomiting.   All other systems reviewed and are negative.      Historical Information   The patient history was reviewed as follows:  Past Medical History:   Diagnosis Date   • Coronary artery disease    • 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HCC)     bullous   • Hypertension    • Obesity    • Sarcoidosis    • SOB (shortness of breath)          Medications:     Current Outpatient Medications:   •  Acetaminophen 80 MG TBDP, Take by mouth if needed, Disp: , Rfl:   •  amoxicillin (AMOXIL) 500 mg capsule, take 1 capsule by mouth three times a day for 7 days (Patient not taking: Reported on 9/14/2023), Disp: , Rfl:   •  apixaban (ELIQUIS) 5 mg, Take 5 mg by mouth 2 (two) times a day 6/2/2022 stopped on 5/31/2022 for Procedure, Disp: , Rfl:   •  aspirin (ECOTRIN LOW STRENGTH) 81 mg EC tablet, Take 81 mg by mouth if needed (Patient not taking: Reported on 4/15/2024), Disp: , Rfl:   •  atorvastatin (LIPITOR) 10 mg tablet, take 1 tablet by mouth once daily, Disp: 90 tablet, Rfl: 1  •  Cyanocobalamin (Vitamin B 12) 100 MCG LOZG, Take 5,000 mcg by mouth in the morning (Patient not taking: Reported on 4/15/2024), Disp: , Rfl:   •  Entresto 49-51 MG TABS, Take 1 tablet by mouth 2 (two) times a day, Disp: , Rfl:   •  Glucosamine Sulfate 500 MG TABS, Take by mouth daily, Disp: , Rfl:   •  glucosamine-chondroitin 500-400 MG tablet, Take 1 tablet by mouth daily , Disp: , Rfl:   •  hydrocortisone (ANUSOL-HC) 2.5 % rectal cream, Apply topically 2 (two) times a day (Patient not taking: Reported on 2/9/2024), Disp: 30 g, Rfl: 1  •  levothyroxine 50 mcg tablet, take 1 tablet by mouth once daily, Disp: 90 tablet, Rfl: 2  •  metoprolol succinate (TOPROL-XL) 50 mg 24 hr tablet, take 1 tablet by mouth once daily, Disp: 90  tablet, Rfl: 2  •  Multiple Vitamins-Minerals (HAIR SKIN AND NAILS FORMULA PO), Take by mouth (Patient not taking: Reported on 4/15/2024), Disp: , Rfl:   •  mupirocin (BACTROBAN) 2 % ointment, Apply topically 3 (three) times a day (Patient not taking: Reported on 7/11/2023), Disp: 22 g, Rfl: 0  •  oxygen gas, Inhale daily at bedtime, Disp: , Rfl:   •  spironolactone (ALDACTONE) 25 mg tablet, Take 25 mg by mouth daily, Disp: , Rfl:   •  torsemide (DEMADEX) 20 mg tablet, Take 20 mg by mouth daily, Disp: , Rfl:     No Known Allergies    OBJECTIVE  Vitals:   Vitals:    05/02/24 1343   BP: 110/64   Pulse: 93   Resp: 20   Temp: 98.2 °F (36.8 °C)   SpO2: 92%   Weight: 93.9 kg (207 lb)         Physical Exam  Vitals reviewed.   Constitutional:       Appearance: She is well-developed.   HENT:      Head: Normocephalic and atraumatic.   Eyes:      Conjunctiva/sclera: Conjunctivae normal.      Pupils: Pupils are equal, round, and reactive to light.   Cardiovascular:      Rate and Rhythm: Normal rate and regular rhythm.      Heart sounds: Normal heart sounds, S1 normal and S2 normal. No murmur heard.  Pulmonary:      Effort: Pulmonary effort is normal. No respiratory distress.      Breath sounds: Normal breath sounds. No wheezing.   Musculoskeletal:         General: Normal range of motion.      Cervical back: Normal range of motion and neck supple.   Skin:     General: Skin is warm.   Neurological:      Mental Status: She is alert and oriented to person, place, and time.   Psychiatric:         Speech: Speech normal.         Behavior: Behavior normal.         Thought Content: Thought content normal.         Judgment: Judgment normal.                    Jaye Gonsalez MD,   [facility]  5/5/2024

## 2024-05-06 ENCOUNTER — OFFICE VISIT (OUTPATIENT)
Dept: PHYSICAL THERAPY | Facility: CLINIC | Age: 79
End: 2024-05-06
Payer: COMMERCIAL

## 2024-05-06 DIAGNOSIS — S42.291D CLOSED 4-PART FRACTURE OF PROXIMAL END OF RIGHT HUMERUS WITH ROUTINE HEALING, SUBSEQUENT ENCOUNTER: Primary | ICD-10-CM

## 2024-05-06 PROCEDURE — 97140 MANUAL THERAPY 1/> REGIONS: CPT

## 2024-05-06 PROCEDURE — 97110 THERAPEUTIC EXERCISES: CPT

## 2024-05-06 NOTE — PROGRESS NOTES
"Daily Note     Today's date: 2024  Patient name: Dianelys Kim  : 1945  MRN: [id number]  Referring provider: Raudel Thornton MD  Dx:   Encounter Diagnosis     ICD-10-CM    1. Closed 4-part fracture of proximal end of right humerus with routine healing, subsequent encounter  S42.291D           Start Time: 1010          Subjective: \"I use my arm at home as much as I can.\"    Objective: See treatment diary below      Assessment: Tolerated treatment fair. Patient demonstrated fatigue post treatment, exhibited good technique with therapeutic exercises, and would benefit from continued PT      Plan: Progress treament per protocol.      Precautions: Medical History    Diagnosis Date Comment Source   Coronary artery disease      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HCC)  bullous    Hypertension      Obesity      Sarcoidosis      SOB (shortness of breath)            Manuals  2024         R shoulder  AA/PROM (HOB elevated) as tolerated AAROM PROM AA/PROM AA/PROM                                              Neuro Re-Ed                                                                                           Seated scap retractions   10x  20x 20x 20x 5 sec hold  20x 5 sec hold        Ther Ex 3/28            Elbow AROM Flex/Sup/Pron 3x10 10x each-    Sup/pron holding red flebar 20x 20x 20x each  20x each ; 1# elbow flexion x 20 reps        Pendulum 10x HEP 20 20x HEP        Table top slide flexion 10x HEP 20x 20x HEP        FIS  20x 20x 20x 20x 20x        Pulleys  10x  20x 20x 20x       AAROM  ----  20x -- 10x reps (hand hold) seated            R gripper #10 x 20 reps R gripper #20 x 10 reps            Seated red ball IR isometric squeeze 20 x 5 sec hold  Seated red ball IR isometric squeeze 20 x 5 sec hold       Ther Activity                  Seated 3# cane bicep curls x 20 reps  Seated 4# cane bicep curls x 20 reps                     Gait Training                                       Modalities "               MH pre/post R shoulder   MH (pre)R shoulder (seated) MH (pre)R shoulder (seated)

## 2024-05-15 ENCOUNTER — VBI (OUTPATIENT)
Dept: ADMINISTRATIVE | Facility: OTHER | Age: 79
End: 2024-05-15

## 2024-05-19 DIAGNOSIS — I48.91 ATRIAL FIBRILLATION, UNSPECIFIED TYPE (HCC): ICD-10-CM

## 2024-05-19 DIAGNOSIS — E03.9 HYPOTHYROIDISM, UNSPECIFIED TYPE: ICD-10-CM

## 2024-05-19 RX ORDER — METOPROLOL SUCCINATE 50 MG/1
TABLET, EXTENDED RELEASE ORAL
Qty: 90 TABLET | Refills: 1 | Status: SHIPPED | OUTPATIENT
Start: 2024-05-19

## 2024-05-19 RX ORDER — LEVOTHYROXINE SODIUM 0.05 MG/1
TABLET ORAL
Qty: 90 TABLET | Refills: 1 | Status: SHIPPED | OUTPATIENT
Start: 2024-05-19

## 2024-07-03 ENCOUNTER — TELEPHONE (OUTPATIENT)
Age: 79
End: 2024-07-03

## 2024-07-03 NOTE — TELEPHONE ENCOUNTER
Lexy from the valve [facility] called to ask if we had received a ct abd/pelvis. Report is under imaging with significant findings.

## 2024-07-05 ENCOUNTER — TELEPHONE (OUTPATIENT)
Age: 79
End: 2024-07-05

## 2024-07-05 DIAGNOSIS — K65.4 SCLEROSING MESENTERITIS (HCC): Primary | ICD-10-CM

## 2024-07-05 NOTE — TELEPHONE ENCOUNTER
Spoke with pt advised of provider response.   Right ear hearing screen completed date: 2023  Right ear screen method: EOAE (evoked otoacoustic emission)  Right ear screen result: Passed  Right ear screen comment: N/A    Left ear hearing screen completed date: 2023  Left ear screen method: EOAE (evoked otoacoustic emission)  Left ear screen result: Passed  Left ear screen comments: N/A

## 2024-07-05 NOTE — TELEPHONE ENCOUNTER
Pt was referred by PCP needs an appointment as soon as possible. Call was transferred to Edwige wyatt nurse to assist.

## 2024-07-05 NOTE — TELEPHONE ENCOUNTER
"Pt. Calling stating she needs to be scheduled with Dr. Riley as soon as possible due to results on a recent CT scan she had done at [facility], unable to find results in pt. Chart, scheduled f/u on 7/19/24, pt. Unsure what CT showed, she said \"possible infection\"? Pt. Is to have procedure coming up and they wanted her to see GI first   "

## 2024-07-05 NOTE — TELEPHONE ENCOUNTER
Reviewed referred to GI.  Advised the patient that she is going for aortic valve replacement and would like her to see them before the surgery given that she will be requiring anesthesia patient agrees

## 2024-07-26 ENCOUNTER — VBI (OUTPATIENT)
Dept: ADMINISTRATIVE | Facility: OTHER | Age: 79
End: 2024-07-26

## 2024-07-26 NOTE — TELEPHONE ENCOUNTER
07/26/24 11:45 AM     Chart reviewed for hospital follow up ; nothing is submitted to the patient's insurance at this time.     Stephanie Warren   [facility]

## 2024-08-07 ENCOUNTER — TELEPHONE (OUTPATIENT)
Dept: PULMONOLOGY | Facility: MEDICAL CENTER | Age: 79
End: 2024-08-07

## 2024-08-13 DIAGNOSIS — E78.5 HYPERLIPIDEMIA, UNSPECIFIED HYPERLIPIDEMIA TYPE: ICD-10-CM

## 2024-08-14 DIAGNOSIS — N18.30 BENIGN HYPERTENSION WITH CHRONIC KIDNEY DISEASE, STAGE III (HCC): ICD-10-CM

## 2024-08-14 DIAGNOSIS — Z12.31 ENCOUNTER FOR SCREENING MAMMOGRAM FOR BREAST CANCER: ICD-10-CM

## 2024-08-14 DIAGNOSIS — E03.9 HYPOTHYROIDISM, UNSPECIFIED TYPE: ICD-10-CM

## 2024-08-14 DIAGNOSIS — I48.91 ATRIAL FIBRILLATION, UNSPECIFIED TYPE (HCC): ICD-10-CM

## 2024-08-14 DIAGNOSIS — I27.20 PULMONARY HYPERTENSION (HCC): ICD-10-CM

## 2024-08-14 DIAGNOSIS — E78.5 HYPERLIPIDEMIA, UNSPECIFIED HYPERLIPIDEMIA TYPE: Primary | ICD-10-CM

## 2024-08-14 DIAGNOSIS — I12.9 BENIGN HYPERTENSION WITH CHRONIC KIDNEY DISEASE, STAGE III (HCC): ICD-10-CM

## 2024-08-14 DIAGNOSIS — E78.5 HYPERLIPIDEMIA, UNSPECIFIED HYPERLIPIDEMIA TYPE: ICD-10-CM

## 2024-08-14 RX ORDER — ATORVASTATIN CALCIUM 10 MG/1
TABLET, FILM COATED ORAL
Qty: 30 TABLET | Refills: 0 | Status: SHIPPED | OUTPATIENT
Start: 2024-08-14 | End: 2024-08-14 | Stop reason: SDUPTHER

## 2024-08-14 RX ORDER — ATORVASTATIN CALCIUM 10 MG/1
10 TABLET, FILM COATED ORAL DAILY
Qty: 90 TABLET | Refills: 1 | Status: SHIPPED | OUTPATIENT
Start: 2024-08-14

## 2024-08-14 NOTE — TELEPHONE ENCOUNTER
Please advise patient her labs are placed for Novembers appointment and mammogram if she would like to schedule

## 2024-08-14 NOTE — TELEPHONE ENCOUNTER
Patient needs updated blood work. Please place orders.   A 30D courtesy refill was provided.      Total Cholesterol within 360 days    LDL within 360 days    HDL within 360 days    Triglycerides within 360 days

## 2024-08-19 ENCOUNTER — TRANSITIONAL CARE MANAGEMENT (OUTPATIENT)
Dept: FAMILY MEDICINE CLINIC | Facility: CLINIC | Age: 79
End: 2024-08-19

## 2024-08-19 ENCOUNTER — TELEPHONE (OUTPATIENT)
Dept: FAMILY MEDICINE CLINIC | Facility: CLINIC | Age: 79
End: 2024-08-19

## 2024-08-19 ENCOUNTER — TELEPHONE (OUTPATIENT)
Age: 79
End: 2024-08-19

## 2024-08-19 NOTE — PROGRESS NOTES
NEPHROLOGY OFFICE VISIT   Dianelys Kim 79 y.o. female MRN: [id number]  8/20/2024    Reason for Visit: Follow-up    INTERVAL HISTORY and SUBJECTIVE:    I had the pleasure of seeing Dianelys today in the renal clinic.  She is a 79-year-old female who follows with Dr. Childers for management of CKD.  She was last seen in the nephrology clinic by this provider April 2024.      Since last visit: Status post TAVR.  Medications were adjusted recently, diuretics placed on hold.  She is drinking less fluid, gained 5 to 10 pounds.  Urinating less.  Short of breath.  No longer feels dizzy or lightheaded since surgery    ASSESSMENT AND PLAN:    Chronic kidney disease: Likely stage IIIB.  No proteinuria  Baseline creatinine: 1.4 to 1.7 mg/dL.  GFR near 27-32.  Current creatinine: Creatinine 1.37 on 8/5 with current creatinine 1.2 with EGFR 46.  Last creatinine 1.52.  eGFR 32  Urinalysis: Negative protein, negative blood, no RBCs, no leuks.  Previous UACR <15  No obstruction on imaging  Renal ultrasound: Normal echogenicity and contour.  Kidneys symmetric and normal size.  Left kidney: Echogenic foci which may represent intrarenal calculi versus vascular calcifications  Patient previously discussed use of SGLT2 inhibitor with her primary care physician.  No change in renal function over the last year.  No proteinuria.  At baseline  Renal function stable     CKD monitoring:   Hemoglobin 10.9, macrocytic  Electrolytes/acid-base: Acceptable     Essential hypertension: On medication since 2017  Volume status:  Weight gain following TAVR.  Patient states 10 pound weight gain with increasing edema and abdominal distention.  More short of breath  Following TAVR torsemide was discontinued, Entresto dose was adjusted and metoprolol dose was decreased to 25 mg daily.  Prior to TAVR spironolactone was placed on hold  Previous medications: Aldactone 25 mg daily, torsemide 20 mg daily, metoprolol succinate 50 mg daily,  Entresto  Recommendations:  Recommend restarting torsemide.  May need twice daily dosing until weight improves  Follow-up labs  Deferring adjustment of diuretic dosing to cardiology who Dianelys is seeing later today.     CKD-MBD:  Check PTH and vitamin D levels prior to next appointment  Prior studies.  October 2023 PTH suppressed 8.8.  At that time calcium mildly elevated 10.9.  Phosphorus was noted to be normal at that time.  And vitamin D level 50.1  Current calcium level normal, 9.9     Ischemic cardiomyopathy/pulmonary hypertension:  Echocardiogram March 2023: Ejection fraction improved to 55%.  Mild atrial stenosis, moderate mitral insufficiency.  Moderate aortic stenosis  Status post AICD/pacemaker  On Entresto, metoprolol, Eliquis, Lipitor, torsemide, Aldactone  Following TAVR Entresto dose lowered and torsemide discontinued.  Additionally metoprolol was decreased to 25 mg daily.  According to notations after TAVR medications were adjusted due to low blood pressure readings.  Prior to TAVR Aldactone discontinued  HERMANN.  Recently followed with pulmonary and did not want to pursue CPAP     Sarcoidosis:  Follows with pulmonary, Dr. Isidro  Diagnosed in 2014  Presence of mediastinal and hilar adenopathy  No evidence of interstitial lung disease on PET/CT     Oxygen dependency:  Chronic shortness of breath  Oxygen via nasal cannula 2 to 3 L as needed  Currently on room air     Right carotid bruit:   Likely referred due to aortic stenosis     Pacemaker: 5/13 generator change planned    Status post TAVR:  SUMMARY taken from postoperative echocardiogram notes: Status post 23 mm Lee 3 Ultra Resilia TAVR on 8/15/2024.  No significant aortic regurgitation. The valve is well-seated. Peak velocity (peak gradient) = 2.1 m/s (17 mmHg), mean gradient = 8 mmHg, ADRIAN = 1.3 cm2, and DI = 0.35.   Known severe mitral regurgitation. Limited visualization due to acoustic shadowing.  Eccentric posteriorly directed regurgitant jet.  No mitral stenosis.   Normal LV size and systolic function; visually estimated LVEF = 55 to 60%. Normal wall motion in the visualized segments. Diastolic function is indeterminate.   Pulmonary hypertension with an estimated PASP = 65 mmHg (RAP = 3 mmHg).   Compared to the prior study dated 6/5/2024, the patient is status post TAVR and PASP is higher      PMH: Hypothyroidism on levothyroxine, panniculitis, anal fissure, COPD/emphysema, HERMANN not using CPAP. Former smoker. History of sarcoidosis, hypertension, hypothyroidism, atrial fibrillation and ischemic cardiomyopathy. Prior CABG. C. difficile January 2024    PATIENT INSTRUCTIONS:    Patient Instructions   Thank you for your kind visit today you were seen today for continued monitoring of kidney function which was stable through your recent cardiac procedure, TAVR.     Recommendations:  Due to weight gain/shortness of breath recommend going back on torsemide daily.  May need to escalate dose for several days due to symptoms.  We will defer that to cardiology at this time  Follow-up blood work recommended after restarting torsemide or any change in medication.  Follow-up blood work before next appointment in approximately 3 months  Continue to avoid NSAIDs such as Motrin Aleve ibuprofen and Advil.  You can take Tylenol for control of pain.  If you have a question about the safety of the medication call our office or asked pharmacist  Call our office at anytime with any questions or concerns.      Orders Placed This Encounter   Procedures    Basic metabolic panel     Standing Status:   Future     Standing Expiration Date:   8/20/2025    Albumin / creatinine urine ratio     Standing Status:   Future     Standing Expiration Date:   8/20/2025    CBC     Standing Status:   Future     Standing Expiration Date:   8/20/2025    Magnesium     Standing Status:   Future     Standing Expiration Date:   8/20/2025    Renal function panel     Standing Status:   Future     Standing  "Expiration Date:   8/20/2025    PTH, intact     Standing Status:   Future     Standing Expiration Date:   8/20/2025    Urinalysis with microscopic     Standing Status:   Future     Standing Expiration Date:   8/20/2025    Vitamin D 25 hydroxy     Standing Status:   Future     Standing Expiration Date:   8/20/2025       OBJECTIVE:  Current Weight: Weight - Scale: 96.6 kg (213 lb)  Vitals:    08/20/24 0932   BP: 122/84   BP Location: Left arm   Patient Position: Sitting   Cuff Size: Large   Pulse: 58   SpO2: 100%   Weight: 96.6 kg (213 lb)   Height: 4' 10\" (1.473 m)    Body mass index is 44.52 kg/m².      REVIEW OF SYSTEMS:    Review of Systems   Constitutional:  Positive for fatigue and unexpected weight change (Feels that she gained 5 to 10 pounds). Negative for activity change, appetite change and fever.   HENT:  Negative for congestion.    Eyes: Negative.    Respiratory:  Positive for shortness of breath.    Cardiovascular:  Positive for leg swelling. Negative for chest pain and palpitations.   Gastrointestinal:  Positive for abdominal distention. Negative for diarrhea, nausea and vomiting.   Endocrine: Negative.    Genitourinary: Negative.    Musculoskeletal:  Positive for arthralgias.   Neurological:  Negative for dizziness, weakness and light-headedness.   Psychiatric/Behavioral:  The patient is not nervous/anxious.        PHYSICAL EXAM:      Physical Exam  Vitals reviewed.   Constitutional:       General: She is not in acute distress.     Appearance: She is well-developed. She is not ill-appearing, toxic-appearing or diaphoretic.   HENT:      Head: Normocephalic and atraumatic.      Nose: No congestion.   Eyes:      General: No scleral icterus.        Right eye: No discharge.         Left eye: No discharge.      Extraocular Movements: Extraocular movements intact.      Conjunctiva/sclera: Conjunctivae normal.   Neck:      Thyroid: No thyromegaly.      Vascular: No carotid bruit or JVD.      Trachea: No " tracheal deviation.   Cardiovascular:      Rate and Rhythm: Normal rate and regular rhythm.      Heart sounds: Murmur heard.      No friction rub. No gallop.   Pulmonary:      Effort: Pulmonary effort is normal. Tachypnea present. No respiratory distress.      Breath sounds: Normal breath sounds. No stridor. No wheezing or rhonchi.   Abdominal:      General: Bowel sounds are normal. There is no distension.      Palpations: Abdomen is soft. There is no mass.      Tenderness: There is no abdominal tenderness. There is no guarding or rebound.   Musculoskeletal:         General: No tenderness or signs of injury. Normal range of motion.      Cervical back: Normal range of motion and neck supple. No rigidity or tenderness.      Right lower leg: Edema present.      Left lower leg: Edema present.   Skin:     General: Skin is warm and dry.      Capillary Refill: Capillary refill takes less than 2 seconds.      Coloration: Skin is not jaundiced or pale.      Findings: Bruising present. No erythema or rash.   Neurological:      Mental Status: She is alert and oriented to person, place, and time.   Psychiatric:         Behavior: Behavior normal.         Thought Content: Thought content normal.         Judgment: Judgment normal.         Medications:    Current Outpatient Medications:     apixaban (ELIQUIS) 5 mg, Take 5 mg by mouth 2 (two) times a day 6/2/2022 stopped on 5/31/2022 for Procedure, Disp: , Rfl:     atorvastatin (LIPITOR) 10 mg tablet, Take 1 tablet (10 mg total) by mouth daily, Disp: 90 tablet, Rfl: 1    Entresto 49-51 MG TABS, Take by mouth 2 (two) times a day TAKE 24-26 MG TWICE A DAY, Disp: , Rfl:     levothyroxine 50 mcg tablet, take 1 tablet by mouth once daily, Disp: 90 tablet, Rfl: 1    metoprolol succinate (TOPROL-XL) 50 mg 24 hr tablet, take 1 tablet by mouth once daily (Patient taking differently: 25 mg daily), Disp: 90 tablet, Rfl: 1    oxygen gas, Inhale daily at bedtime, Disp: , Rfl:     Acetaminophen  "80 MG TBDP, Take by mouth if needed, Disp: , Rfl:     amoxicillin (AMOXIL) 500 mg capsule, take 1 capsule by mouth three times a day for 7 days (Patient not taking: Reported on 9/14/2023), Disp: , Rfl:     aspirin (ECOTRIN LOW STRENGTH) 81 mg EC tablet, Take 81 mg by mouth if needed (Patient not taking: Reported on 4/15/2024), Disp: , Rfl:     Cyanocobalamin (Vitamin B 12) 100 MCG LOZG, Take 5,000 mcg by mouth in the morning (Patient not taking: Reported on 4/15/2024), Disp: , Rfl:     Glucosamine Sulfate 500 MG TABS, Take by mouth daily (Patient not taking: Reported on 8/20/2024), Disp: , Rfl:     glucosamine-chondroitin 500-400 MG tablet, Take 1 tablet by mouth daily  (Patient not taking: Reported on 8/20/2024), Disp: , Rfl:     hydrocortisone (ANUSOL-HC) 2.5 % rectal cream, Apply topically 2 (two) times a day (Patient not taking: Reported on 2/9/2024), Disp: 30 g, Rfl: 1    Multiple Vitamins-Minerals (HAIR SKIN AND NAILS FORMULA PO), Take by mouth (Patient not taking: Reported on 4/15/2024), Disp: , Rfl:     mupirocin (BACTROBAN) 2 % ointment, Apply topically 3 (three) times a day (Patient not taking: Reported on 7/11/2023), Disp: 22 g, Rfl: 0    spironolactone (ALDACTONE) 25 mg tablet, Take 25 mg by mouth daily (Patient not taking: Reported on 8/20/2024), Disp: , Rfl:     torsemide (DEMADEX) 20 mg tablet, Take 20 mg by mouth daily (Patient not taking: Reported on 8/20/2024), Disp: , Rfl:     Laboratory Results:        Invalid input(s): \"ALBUMIN\"    Results for orders placed or performed in visit on 03/28/24   Overnight Oximetry Test   Result Value Ref Range    Supplier Name [facility]     Supplier Phone Number [phone]     Order Status Contacting Patient     Delivery Note      Delivery Request Date 03/28/2024     Item Description Overnight Oximetry Test            "

## 2024-08-20 ENCOUNTER — OFFICE VISIT (OUTPATIENT)
Dept: NEPHROLOGY | Facility: CLINIC | Age: 79
End: 2024-08-20
Payer: COMMERCIAL

## 2024-08-20 VITALS
SYSTOLIC BLOOD PRESSURE: 122 MMHG | OXYGEN SATURATION: 100 % | WEIGHT: 213 LBS | HEART RATE: 58 BPM | BODY MASS INDEX: 44.71 KG/M2 | DIASTOLIC BLOOD PRESSURE: 84 MMHG | HEIGHT: 58 IN

## 2024-08-20 DIAGNOSIS — N18.30 BENIGN HYPERTENSION WITH CHRONIC KIDNEY DISEASE, STAGE III (HCC): Primary | ICD-10-CM

## 2024-08-20 DIAGNOSIS — N18.30 STAGE 3 CHRONIC KIDNEY DISEASE, UNSPECIFIED WHETHER STAGE 3A OR 3B CKD (HCC): ICD-10-CM

## 2024-08-20 DIAGNOSIS — I12.9 BENIGN HYPERTENSION WITH CHRONIC KIDNEY DISEASE, STAGE III (HCC): Primary | ICD-10-CM

## 2024-08-20 DIAGNOSIS — I27.20 PULMONARY HYPERTENSION (HCC): ICD-10-CM

## 2024-08-20 PROCEDURE — 99214 OFFICE O/P EST MOD 30 MIN: CPT | Performed by: NURSE PRACTITIONER

## 2024-08-20 NOTE — PATIENT INSTRUCTIONS
Thank you for your kind visit today you were seen today for continued monitoring of kidney function which was stable through your recent cardiac procedure, TAVR.     Recommendations:  Due to weight gain/shortness of breath recommend going back on torsemide daily.  May need to escalate dose for several days due to symptoms.  We will defer that to cardiology at this time  Follow-up blood work recommended after restarting torsemide or any change in medication.  Follow-up blood work before next appointment in approximately 3 months  Continue to avoid NSAIDs such as Motrin Aleve ibuprofen and Advil.  You can take Tylenol for control of pain.  If you have a question about the safety of the medication call our office or asked pharmacist  Call our office at anytime with any questions or concerns.

## 2024-08-22 ENCOUNTER — OFFICE VISIT (OUTPATIENT)
Dept: FAMILY MEDICINE CLINIC | Facility: CLINIC | Age: 79
End: 2024-08-22
Payer: COMMERCIAL

## 2024-08-22 VITALS
SYSTOLIC BLOOD PRESSURE: 102 MMHG | HEART RATE: 74 BPM | TEMPERATURE: 98 F | BODY MASS INDEX: 43.37 KG/M2 | RESPIRATION RATE: 18 BRPM | DIASTOLIC BLOOD PRESSURE: 70 MMHG | OXYGEN SATURATION: 95 % | HEIGHT: 58 IN | WEIGHT: 206.6 LBS

## 2024-08-22 DIAGNOSIS — Z98.890 S/P AORTIC VALVE REPAIR: Primary | ICD-10-CM

## 2024-08-22 DIAGNOSIS — I48.19 PERSISTENT ATRIAL FIBRILLATION (HCC): ICD-10-CM

## 2024-08-22 DIAGNOSIS — I12.9 BENIGN HYPERTENSION WITH CHRONIC KIDNEY DISEASE, STAGE III (HCC): ICD-10-CM

## 2024-08-22 DIAGNOSIS — I27.20 PULMONARY HYPERTENSION (HCC): ICD-10-CM

## 2024-08-22 DIAGNOSIS — J43.2 CENTRILOBULAR EMPHYSEMA (HCC): ICD-10-CM

## 2024-08-22 DIAGNOSIS — N18.30 BENIGN HYPERTENSION WITH CHRONIC KIDNEY DISEASE, STAGE III (HCC): ICD-10-CM

## 2024-08-22 PROCEDURE — 99496 TRANSJ CARE MGMT HIGH F2F 7D: CPT | Performed by: FAMILY MEDICINE

## 2024-08-22 RX ORDER — FUROSEMIDE 40 MG
40 TABLET ORAL DAILY
COMMUNITY
Start: 2024-08-20

## 2024-08-22 NOTE — PROGRESS NOTES
Transition of Care Visit  Name: Dianelys Kim      : 1945      MRN: [id number]  Encounter Provider: Jaye Gonsalez MD  Encounter Date: 2024   Encounter department: [facility]    Assessment & Plan   1. S/P aortic valve repair  2. Persistent atrial fibrillation (HCC)  3. Centrilobular emphysema (HCC)  4. Pulmonary hypertension (HCC)  5. Benign hypertension with chronic kidney disease, stage III (HCC)    Concerning about weight status  Recommend monitor daily weight and breathing.  If worsening to please let us know.   Cardiology sent medications into pharmacy  HTN currently low. Recommend watching BP at home         History of Present Illness     Transitional Care Management Review:   Dianelys Kim is a 79 y.o. female here for TCM follow up.     During the TCM phone call patient stated:  TCM Call     Date and time call was made  2024 11:27 AM    Hospital care reviewed  Records reviewed    Patient was hospitialized at  Other (comment)    Comment  [facility]    Date of Admission  08/15/24    Date of discharge  24    Diagnosis  Stenosis    Disposition  Home    Were the patients medications reviewed and updated  Yes    Current Symptoms  None      TCM Call     Post hospital issues  None    Should patient be enrolled in anticoag monitoring?  No    Scheduled for follow up?  Yes    Did you obtain your prescribed medications  Yes    Do you need help managing your prescriptions or medications  Yes    Is transportation to your appointment needed  Yes    I have advised the patient to call PCP with any new or worsening symptoms  C [facility], lpn    Living Arrangements  Alone    Are you recieving any outpatient services  No    Are you recieving home care services  No    Are you using any community resources  No    Current waiver services  No    Have you fallen in the last 12 months  No    Interperter language line needed  No        HPI    August 15 the patient was admitted to [facility] for an aortic valve replacement.  Patient states overall she has been doing very well.  Her overall concern is that she has been having leg swelling fatigue shortness of breath and wheezing at times.  Was encouraged to start taking higher doses of her diuretic.  Patient states that she does not like taking the full dose given that she does not like the urinary frequency but is present with the daughter today who states that she will be starting her full dose when she picks it up from the pharmacy today  Review of Systems   Constitutional:  Positive for fatigue. Negative for activity change, appetite change, chills and fever.   HENT:  Negative for congestion.    Respiratory:  Positive for shortness of breath and wheezing. Negative for cough and chest tightness.    Cardiovascular:  Positive for leg swelling. Negative for chest pain.   Gastrointestinal:  Negative for abdominal distention, abdominal pain, constipation, diarrhea, nausea and vomiting.   All other systems reviewed and are negative.    Objective     /70 (BP Location: Left arm, Patient Position: Sitting, Cuff Size: Large)   Pulse 74   Temp 98 °F (36.7 °C) (Temporal)   Resp 18   Ht 4' 10\" (1.473 m)   Wt 93.7 kg (206 lb 9.6 oz)   SpO2 95%   BMI 43.18 kg/m²     Physical Exam  Vitals reviewed.   Constitutional:       Appearance: Normal appearance. She is well-developed.   HENT:      Head: Normocephalic and atraumatic.      Right Ear: Tympanic membrane, ear canal and external ear normal. There is no impacted cerumen.      Left Ear: Tympanic membrane, ear canal and external ear normal. There is no impacted cerumen.      Nose: Nose normal.      Mouth/Throat:      Mouth: Mucous membranes are moist.      Pharynx: Oropharynx is clear.   Eyes:      Conjunctiva/sclera: Conjunctivae normal.      Pupils: Pupils are equal, round, and reactive to light.   Cardiovascular:      Rate and Rhythm: Normal rate and regular rhythm.      Heart sounds: Normal " heart sounds.   Pulmonary:      Effort: Pulmonary effort is normal.      Breath sounds: Normal breath sounds.   Abdominal:      General: Abdomen is flat. Bowel sounds are normal.      Palpations: Abdomen is soft.   Musculoskeletal:         General: Normal range of motion.      Cervical back: Normal range of motion and neck supple.      Right lower leg: Edema present.      Left lower leg: Edema present.   Skin:     General: Skin is warm.      Capillary Refill: Capillary refill takes less than 2 seconds.   Neurological:      General: No focal deficit present.      Mental Status: She is alert and oriented to person, place, and time. Mental status is at baseline.   Psychiatric:         Mood and Affect: Mood normal.         Behavior: Behavior normal.         Thought Content: Thought content normal.         Judgment: Judgment normal.       Medications have been reviewed by provider in current encounter    Administrative Statements

## 2024-08-30 ENCOUNTER — APPOINTMENT (OUTPATIENT)
Dept: LAB | Facility: CLINIC | Age: 79
End: 2024-08-30
Payer: COMMERCIAL

## 2024-08-30 DIAGNOSIS — G47.33 OBSTRUCTIVE SLEEP APNEA: ICD-10-CM

## 2024-08-30 DIAGNOSIS — I48.91 ATRIAL FIBRILLATION, UNSPECIFIED TYPE (HCC): ICD-10-CM

## 2024-08-30 DIAGNOSIS — E87.5 HYPERKALEMIA: ICD-10-CM

## 2024-08-30 DIAGNOSIS — I25.5 ISCHEMIC CARDIOMYOPATHY: ICD-10-CM

## 2024-08-30 DIAGNOSIS — E78.5 HYPERLIPIDEMIA, UNSPECIFIED HYPERLIPIDEMIA TYPE: ICD-10-CM

## 2024-08-30 DIAGNOSIS — R19.8 CHANGE IN BOWEL MOVEMENT: ICD-10-CM

## 2024-08-30 DIAGNOSIS — E87.1 HYPONATREMIA: ICD-10-CM

## 2024-08-30 DIAGNOSIS — D86.9 SARCOIDOSIS: ICD-10-CM

## 2024-08-30 DIAGNOSIS — E83.52 HYPERCALCEMIA: ICD-10-CM

## 2024-08-30 DIAGNOSIS — N18.32 STAGE 3B CHRONIC KIDNEY DISEASE (HCC): ICD-10-CM

## 2024-08-30 DIAGNOSIS — E03.9 HYPOTHYROIDISM, UNSPECIFIED TYPE: ICD-10-CM

## 2024-08-30 DIAGNOSIS — E55.9 VITAMIN D DEFICIENCY: ICD-10-CM

## 2024-08-30 DIAGNOSIS — N18.31 STAGE 3A CHRONIC KIDNEY DISEASE (HCC): ICD-10-CM

## 2024-08-30 DIAGNOSIS — I12.9 BENIGN HYPERTENSION WITH CHRONIC KIDNEY DISEASE, STAGE III (HCC): ICD-10-CM

## 2024-08-30 DIAGNOSIS — N18.30 STAGE 3 CHRONIC KIDNEY DISEASE, UNSPECIFIED WHETHER STAGE 3A OR 3B CKD (HCC): ICD-10-CM

## 2024-08-30 DIAGNOSIS — N18.30 BENIGN HYPERTENSION WITH CHRONIC KIDNEY DISEASE, STAGE III (HCC): ICD-10-CM

## 2024-08-30 DIAGNOSIS — I50.32 CHRONIC DIASTOLIC HEART FAILURE (HCC): ICD-10-CM

## 2024-08-30 DIAGNOSIS — I27.20 PULMONARY HYPERTENSION (HCC): ICD-10-CM

## 2024-08-30 DIAGNOSIS — I48.19 PERSISTENT ATRIAL FIBRILLATION (HCC): ICD-10-CM

## 2024-08-30 DIAGNOSIS — N18.4 CHRONIC RENAL DISEASE, STAGE IV (HCC): ICD-10-CM

## 2024-08-30 DIAGNOSIS — E03.9 ACQUIRED HYPOTHYROIDISM: ICD-10-CM

## 2024-08-30 LAB
25(OH)D3 SERPL-MCNC: 32.8 NG/ML (ref 30–100)
ALBUMIN SERPL BCG-MCNC: 4 G/DL (ref 3.5–5)
ALP SERPL-CCNC: 77 U/L (ref 34–104)
ALT SERPL W P-5'-P-CCNC: 21 U/L (ref 7–52)
ANION GAP SERPL CALCULATED.3IONS-SCNC: 13 MMOL/L (ref 4–13)
AST SERPL W P-5'-P-CCNC: 20 U/L (ref 13–39)
BACTERIA UR QL AUTO: NORMAL /HPF
BASOPHILS # BLD AUTO: 0.06 THOUSANDS/ÂΜL (ref 0–0.1)
BASOPHILS NFR BLD AUTO: 1 % (ref 0–1)
BILIRUB SERPL-MCNC: 0.52 MG/DL (ref 0.2–1)
BILIRUB UR QL STRIP: NEGATIVE
BNP SERPL-MCNC: 237 PG/ML (ref 0–100)
BUN SERPL-MCNC: 33 MG/DL (ref 5–25)
CALCIUM SERPL-MCNC: 9.4 MG/DL (ref 8.4–10.2)
CHLORIDE SERPL-SCNC: 101 MMOL/L (ref 96–108)
CHOLEST SERPL-MCNC: 154 MG/DL
CLARITY UR: CLEAR
CO2 SERPL-SCNC: 23 MMOL/L (ref 21–32)
COLOR UR: COLORLESS
CREAT SERPL-MCNC: 1.21 MG/DL (ref 0.6–1.3)
CREAT UR-MCNC: 27.6 MG/DL
EOSINOPHIL # BLD AUTO: 0.23 THOUSAND/ÂΜL (ref 0–0.61)
EOSINOPHIL NFR BLD AUTO: 3 % (ref 0–6)
ERYTHROCYTE [DISTWIDTH] IN BLOOD BY AUTOMATED COUNT: 14.7 % (ref 11.6–15.1)
GFR SERPL CREATININE-BSD FRML MDRD: 42 ML/MIN/1.73SQ M
GLUCOSE P FAST SERPL-MCNC: 87 MG/DL (ref 65–99)
GLUCOSE UR STRIP-MCNC: NEGATIVE MG/DL
HCT VFR BLD AUTO: 40 % (ref 34.8–46.1)
HDLC SERPL-MCNC: 45 MG/DL
HGB BLD-MCNC: 12.5 G/DL (ref 11.5–15.4)
HGB UR QL STRIP.AUTO: NEGATIVE
IMM GRANULOCYTES # BLD AUTO: 0.04 THOUSAND/UL (ref 0–0.2)
IMM GRANULOCYTES NFR BLD AUTO: 1 % (ref 0–2)
KETONES UR STRIP-MCNC: NEGATIVE MG/DL
LDLC SERPL CALC-MCNC: 90 MG/DL (ref 0–100)
LEUKOCYTE ESTERASE UR QL STRIP: NEGATIVE
LYMPHOCYTES # BLD AUTO: 0.48 THOUSANDS/ÂΜL (ref 0.6–4.47)
LYMPHOCYTES NFR BLD AUTO: 6 % (ref 14–44)
MAGNESIUM SERPL-MCNC: 2.1 MG/DL (ref 1.9–2.7)
MCH RBC QN AUTO: 32.1 PG (ref 26.8–34.3)
MCHC RBC AUTO-ENTMCNC: 31.3 G/DL (ref 31.4–37.4)
MCV RBC AUTO: 103 FL (ref 82–98)
MICROALBUMIN UR-MCNC: <7 MG/L
MONOCYTES # BLD AUTO: 0.68 THOUSAND/ÂΜL (ref 0.17–1.22)
MONOCYTES NFR BLD AUTO: 9 % (ref 4–12)
NEUTROPHILS # BLD AUTO: 6.05 THOUSANDS/ÂΜL (ref 1.85–7.62)
NEUTS SEG NFR BLD AUTO: 80 % (ref 43–75)
NITRITE UR QL STRIP: NEGATIVE
NON-SQ EPI CELLS URNS QL MICRO: NORMAL /HPF
NONHDLC SERPL-MCNC: 109 MG/DL
NRBC BLD AUTO-RTO: 0 /100 WBCS
PH UR STRIP.AUTO: 6 [PH]
PHOSPHATE SERPL-MCNC: 3.2 MG/DL (ref 2.3–4.1)
PLATELET # BLD AUTO: 233 THOUSANDS/UL (ref 149–390)
PMV BLD AUTO: 10.7 FL (ref 8.9–12.7)
POTASSIUM SERPL-SCNC: 4.3 MMOL/L (ref 3.5–5.3)
PROT SERPL-MCNC: 7 G/DL (ref 6.4–8.4)
PROT UR STRIP-MCNC: NEGATIVE MG/DL
PTH-INTACT SERPL-MCNC: 56.6 PG/ML (ref 12–88)
RBC # BLD AUTO: 3.9 MILLION/UL (ref 3.81–5.12)
RBC #/AREA URNS AUTO: NORMAL /HPF
SODIUM SERPL-SCNC: 137 MMOL/L (ref 135–147)
SP GR UR STRIP.AUTO: 1.01 (ref 1–1.03)
T4 FREE SERPL-MCNC: 1.04 NG/DL (ref 0.61–1.12)
TRIGL SERPL-MCNC: 95 MG/DL
TSH SERPL DL<=0.05 MIU/L-ACNC: 1.6 UIU/ML (ref 0.45–4.5)
UROBILINOGEN UR STRIP-ACNC: <2 MG/DL
WBC # BLD AUTO: 7.54 THOUSAND/UL (ref 4.31–10.16)
WBC #/AREA URNS AUTO: NORMAL /HPF

## 2024-08-30 PROCEDURE — 80053 COMPREHEN METABOLIC PANEL: CPT

## 2024-08-30 PROCEDURE — 84439 ASSAY OF FREE THYROXINE: CPT

## 2024-08-30 PROCEDURE — 83970 ASSAY OF PARATHORMONE: CPT

## 2024-08-30 PROCEDURE — 85025 COMPLETE CBC W/AUTO DIFF WBC: CPT

## 2024-08-30 PROCEDURE — 36415 COLL VENOUS BLD VENIPUNCTURE: CPT

## 2024-08-30 PROCEDURE — 83880 ASSAY OF NATRIURETIC PEPTIDE: CPT

## 2024-08-30 PROCEDURE — 83735 ASSAY OF MAGNESIUM: CPT

## 2024-08-30 PROCEDURE — 82570 ASSAY OF URINE CREATININE: CPT

## 2024-08-30 PROCEDURE — 81001 URINALYSIS AUTO W/SCOPE: CPT

## 2024-08-30 PROCEDURE — 80061 LIPID PANEL: CPT

## 2024-08-30 PROCEDURE — 84443 ASSAY THYROID STIM HORMONE: CPT

## 2024-08-30 PROCEDURE — 82043 UR ALBUMIN QUANTITATIVE: CPT

## 2024-08-30 PROCEDURE — 84100 ASSAY OF PHOSPHORUS: CPT

## 2024-08-30 PROCEDURE — 82306 VITAMIN D 25 HYDROXY: CPT

## 2024-09-03 ENCOUNTER — TELEPHONE (OUTPATIENT)
Dept: NEPHROLOGY | Facility: CLINIC | Age: 79
End: 2024-09-03

## 2024-09-03 ENCOUNTER — TELEPHONE (OUTPATIENT)
Dept: FAMILY MEDICINE CLINIC | Facility: CLINIC | Age: 79
End: 2024-09-03

## 2024-09-03 NOTE — TELEPHONE ENCOUNTER
----- Message from Jaye Curry MD sent at 9/3/2024 12:06 AM EDT -----  Please advise the patient that I reviewed her blood work  Her kidney function has been stable  No signs anemia; however her MCV is still elevated I want to make sure that the patient is taking a B complex vitamin in order to avoid vitamin B12 deficiency.    Cholesterol panel is currently stable

## 2024-09-03 NOTE — RESULT ENCOUNTER NOTE
Please let Dianelys know that labs were reviewed.  Blood work and urine studies are stable.  Renal function numbers look very good at this time.  Please let me know if Dianelys has any questions or concerns I will be happy to call her back to discuss further.

## 2024-09-03 NOTE — TELEPHONE ENCOUNTER
Spoke with patient about the following, she expressed understanding and thanked us for the call:    ----- Message from LILLIANA Simmons sent at 9/3/2024 11:11 AM EDT -----  Please let Dianelys know that labs were reviewed.  Blood work and urine studies are stable.  Renal function numbers look very good at this time.  Please let me know if Dianelys has any questions or concerns I will be happy to call her back to discuss further.

## 2024-09-23 ENCOUNTER — OFFICE VISIT (OUTPATIENT)
Dept: PULMONOLOGY | Facility: MEDICAL CENTER | Age: 79
End: 2024-09-23
Payer: COMMERCIAL

## 2024-09-23 VITALS
TEMPERATURE: 98 F | HEIGHT: 58 IN | HEART RATE: 76 BPM | DIASTOLIC BLOOD PRESSURE: 64 MMHG | RESPIRATION RATE: 12 BRPM | OXYGEN SATURATION: 93 % | SYSTOLIC BLOOD PRESSURE: 122 MMHG | BODY MASS INDEX: 43.83 KG/M2 | WEIGHT: 208.8 LBS

## 2024-09-23 DIAGNOSIS — G47.33 OBSTRUCTIVE SLEEP APNEA: Chronic | ICD-10-CM

## 2024-09-23 DIAGNOSIS — I34.0 NONRHEUMATIC MITRAL VALVE REGURGITATION: ICD-10-CM

## 2024-09-23 DIAGNOSIS — E66.813 CLASS 3 SEVERE OBESITY DUE TO EXCESS CALORIES WITH SERIOUS COMORBIDITY AND BODY MASS INDEX (BMI) OF 40.0 TO 44.9 IN ADULT (HCC): Primary | ICD-10-CM

## 2024-09-23 DIAGNOSIS — E66.01 CLASS 3 SEVERE OBESITY DUE TO EXCESS CALORIES WITH SERIOUS COMORBIDITY AND BODY MASS INDEX (BMI) OF 40.0 TO 44.9 IN ADULT (HCC): Primary | ICD-10-CM

## 2024-09-23 DIAGNOSIS — D86.9 SARCOIDOSIS: Chronic | ICD-10-CM

## 2024-09-23 DIAGNOSIS — G47.34 NOCTURNAL HYPOXEMIA: ICD-10-CM

## 2024-09-23 PROCEDURE — 99214 OFFICE O/P EST MOD 30 MIN: CPT | Performed by: INTERNAL MEDICINE

## 2024-09-23 RX ORDER — METOPROLOL SUCCINATE 25 MG/1
25 TABLET, EXTENDED RELEASE ORAL DAILY
COMMUNITY
Start: 2024-09-13

## 2024-09-23 RX ORDER — SACUBITRIL AND VALSARTAN 24; 26 MG/1; MG/1
1 TABLET, FILM COATED ORAL 2 TIMES DAILY
COMMUNITY
Start: 2024-09-14

## 2024-09-23 NOTE — PROGRESS NOTES
Assessment & Plan        Problem List Items Addressed This Visit          Cardiovascular and Mediastinum    Nonrheumatic mitral valve regurgitation     Dianelys did have a TAVR procedure done on 8/15/2024 for severe aortic stenosis this was done at [facility].  She also has persistent severe mitral regurgitation and some pulmonary hypertension with estimated PA pressure of 85 mmHg on last echo done in September 2024.  She she is going to be scheduled for some type of mitral valve procedure         Relevant Medications    metoprolol succinate (TOPROL-XL) 25 mg 24 hr tablet    Entresto 24-26 MG TABS       Respiratory    Obstructive sleep apnea (Chronic)     She does have history of mild to moderate HERMANN and had overall HI of 25.  She does have sleep-related hypoxemia as well which is prior related to alveolar hypoventilation from obesity and possibly due to her sleep apnea.  She cannot tolerate CPAP therapy.  She does use 3 L of oxygen at bedtime.  [employer].  She did have nocturnal oximetry testing done July 2023 with her just using room air at home and has had mild hypoxemia for 4 hours per night with average O2 saturation 88% and oxygen humza 75%.  She is using oxygen on regular basis 2 L at night and sometimes during the day         Nocturnal hypoxemia     Dianelys does have sleep-related hypoxemia due to hypoventilation from obesity and possibly due to underlying PE hypertension and does have some mild to moderate HERMANN but could not tolerate CPAP therapy.  She will continue with oxygen 2 L/min at bedtime.  She periodically does use oxygen during the day when needed.  Room air O2 saturation today is 93%.  DME company is [employer]            Other    Sarcoidosis (Chronic)     He does have history of sarcoidosis but this is limited disease.  She was diagnosed in 2014 and has no evidence of interstitial lung disease.  She had right cervical lymph node biopsy done back then which showed  noncaseating granulomatous disease.  Last PET CT scan done Sara 15, 2023 showed only mild mediastinal and bilateral hilar adenopathy and some mild abdominal retroperitoneal adenopathy with some slight hypermetabolic activity consistent sarcoidosis.  She has not had any steroid therapy for this.         Class 3 severe obesity due to excess calories with serious comorbidity and body mass index (BMI) of 40.0 to 44.9 in adult (HCC) - Primary     Dianelys is trying to lose weight.  I did tell her weight loss would probably help with her oxygenation at nighttime.              Cc: Shortness of breath with activity    HPI    Dianelys did undergo TAVR procedure on 8/15/2024 for severe aortic stenosis and this was done at [facility] by Dr. Tereso Vargas.  She had an [employer] 23 mm valve placed.  Echocardiogram that was done afterwards on September 17, 2024 through [facility] showed left ventricular ejection fraction is to be normal at 55 to 60%.  There is dilated right ventricle with preserved RV systolic function.  There was severe mitral regurgitation.  There is moderate to severe tricuspid regurgitation and estimated PA pressure was 65 mmHg.  She does have coronary artery disease and had CABG x 4 in 2017.  She also has AICD device placed in 2017.  She does have history of left bundle branch block.  Also has history of paroxysmal atrial fibrillation.  She is on Eliquis therapy.  Has history of hypertension and hypothyroidism.    Dianelys was diagnosed with sarcoidosis in 2014.  She had a right cervical lymph node biopsy done by Dr. Kjellberg which showed noncaseating granulomatous disease.  She did have a PET CT scan done 6/15/2023 which showed some mild mediastinal and bilateral hilar adenopathy and some abdominal retroperitoneal adenopathy with some slight hypermetabolic activity all consistent with sarcoidosis.  She is legally blind in her left eye.    Dianelys does have sleep-related hypoxemia  and also exertional hypoxemia.  She does have home oxygen [facility].  She is 3 L/min at bedtime and can use during a day if needed.  She did have a sleep study in the past which showed mild to moderate HERMANN AHI of 25.  She also has had room air with nocturnal pulse oximetry recording done in March 2023 which showed sleep-related hypoxemia.  Her mean O2 saturation was 89%.  She spent almost 4 hours with O2 saturation less than or equal to 88%.  Oxygen humza was 75%.    She quit smoking in 1987 and smoked 1 to 1.5 packs of per day for 30 years.  Spirometry done last office visit on March 20 23 showed lung volumes to be normal.  Forced vital capacity was 1.97 L or 97% of predicted, FEV1 1.42 L or 95% of predicted and obstructive index 72%.    Dianelys states she still gets short of breath with exertion.  This has remained stable and did not improve even with her TAVR procedure.  She did state that her cardiologist did tell her that she likely would need to have her other valve replaced.  She did state this wouuld be a mitral valve procedure.  She does have severe mitral regurgitation.  She does have appointment with her cardiologist Dr. Sánchez October 16 and will discuss this with him.  She states she did have some leg edema ever since her surgery and some of her medication was changed.  Still has some mild persistent edema.  Her Entresto dose was decreased to 24-26 1 tablet twice a day and metoprolol is decreased to 25 mg daily.  She is on furosemide now 40 mg daily instead of torsemide 20 mg twice daily    Echocardiogram done September 17, 2024 showed right ventricular systolic function being preserved but there was dilated right ventricle.  Left ventricular ejection fraction was normal at 55 to 60%.  There was severe mitral regurgitation she has a TAVR which was placed on 8/15/2024 and valve appeared to be functioning well.  There was evidence of moderate to severe tricuspid regurgitation and estimated PA pressure  was 65 mmHg    She is using oxygen 2 L/min at bedtime and is not having any nocturnal dyspnea.  She sometimes gets some dry nose from this.  She does use softer nasal cannula tubing.  She does have a battery-operated pulsed dose oxygen concentrator but states this is heavy and she does not use it.  Her [employer] company is [employer].  She did try CPAP in past but could not tolerate it.  She is not have any nocturnal dyspnea or excessive daytime somnolence.    Dianelys did smoke from age 17 to age 41 years old anywhere from 1 pack to 1.5 packs/day.  She has used inhaler therapy in the past for some exertional shortness of breath she had has had but this did not help.    She states during the day she does know she has increased saliva secretion from her mouth.  She is not sure what is causing this.      Past Medical History:   Diagnosis Date    Coronary artery disease     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HCC)     bullous    Hypertension     Obesity     Sarcoidosis     SOB (shortness of breath)        Past Surgical History:   Procedure Laterality Date    CARDIAC SURGERY  06/02/2017    CARDIAC VALVE REPLACEMENT  08/15/2024    aortic valve    CATARACT EXTRACTION Left 07/28/2019    CYST REMOVAL      Lipoma    DENTAL SURGERY  12/2023    DILATION AND CURETTAGE OF UTERUS      EYE SURGERY      INSERT / REPLACE / REMOVE PACEMAKER  12/20/2017    IR BIOPSY LYMPH NODE  08/11/2023    OVARIAN CYST REMOVAL           Current Outpatient Medications:     Acetaminophen 80 MG TBDP, Take by mouth if needed, Disp: , Rfl:     apixaban (ELIQUIS) 5 mg, Take 5 mg by mouth 2 (two) times a day 6/2/2022 stopped on 5/31/2022 for Procedure, Disp: , Rfl:     atorvastatin (LIPITOR) 10 mg tablet, Take 1 tablet (10 mg total) by mouth daily, Disp: 90 tablet, Rfl: 1    Cyanocobalamin (Vitamin B 12) 100 MCG LOZG, Take 5,000 mcg by mouth in the morning, Disp: , Rfl:     Entresto 24-26 MG TABS, Take 1 tablet by mouth 2 (two) times a day, Disp: , Rfl:     furosemide (LASIX)  40 mg tablet, Take 40 mg by mouth daily, Disp: , Rfl:     metoprolol succinate (TOPROL-XL) 25 mg 24 hr tablet, Take 25 mg by mouth daily, Disp: , Rfl:     metoprolol succinate (TOPROL-XL) 50 mg 24 hr tablet, take 1 tablet by mouth once daily (Patient taking differently: 25 mg), Disp: 90 tablet, Rfl: 1    oxygen gas, Inhale daily at bedtime, Disp: , Rfl:     aspirin (ECOTRIN LOW STRENGTH) 81 mg EC tablet, Take 81 mg by mouth if needed (Patient not taking: Reported on 4/15/2024), Disp: , Rfl:     Entresto 49-51 MG TABS, Take by mouth 2 (two) times a day TAKE 24-26 MG TWICE A DAY (Patient not taking: Reported on 2024), Disp: , Rfl:     Glucosamine Sulfate 500 MG TABS, Take by mouth daily (Patient not taking: Reported on 2024), Disp: , Rfl:     glucosamine-chondroitin 500-400 MG tablet, Take 1 tablet by mouth daily  (Patient not taking: Reported on 2024), Disp: , Rfl:     levothyroxine 50 mcg tablet, take 1 tablet by mouth once daily (Patient not taking: Reported on 2024), Disp: 90 tablet, Rfl: 1    Multiple Vitamins-Minerals (HAIR SKIN AND NAILS FORMULA PO), Take by mouth (Patient not taking: Reported on 4/15/2024), Disp: , Rfl:     torsemide (DEMADEX) 20 mg tablet, Take 20 mg by mouth daily (Patient not taking: Reported on 2024), Disp: , Rfl:     No Known Allergies    Social History     Tobacco Use    Smoking status: Former     Current packs/day: 0.00     Average packs/day: 1.5 packs/day for 31.0 years (46.5 ttl pk-yrs)     Types: Cigarettes     Start date:      Quit date:      Years since quittin.7    Smokeless tobacco: Never   Substance Use Topics    Alcohol use: Not Currently     Comment: rare         Family History   Problem Relation Age of Onset    Emphysema Father     Cancer Sister         ovarian    Heart attack Brother     Heart disease Family        Review of Systems   Constitutional:  Negative for chills, fever and unexpected weight change.   HENT:  Negative for  "congestion, rhinorrhea and sore throat.    Eyes:  Negative for discharge and redness.   Respiratory:  Positive for shortness of breath. Negative for cough.    Cardiovascular:  Negative for chest pain, palpitations and leg swelling.   Gastrointestinal:  Negative for abdominal distention, abdominal pain and nausea.   Endocrine: Negative for polydipsia and polyphagia.   Genitourinary:  Negative for dysuria.   Musculoskeletal:  Negative for joint swelling and myalgias.   Skin:  Negative for rash.   Neurological:  Negative for light-headedness.   Psychiatric/Behavioral:  Negative for decreased concentration.            Vitals:    09/23/24 0946   BP: 122/64   Pulse: 76   Resp: 12   Temp: 98 °F (36.7 °C)   SpO2: 93%     Height: 4' 10\" (147.3 cm)  IBW (Ideal Body Weight): 40.9 kg  Body mass index is 43.64 kg/m².  Weight (last 2 days)       Date/Time Weight    09/23/24 0946 94.7 (208.8)                Physical Exam  Vitals reviewed.   Constitutional:       General: She is not in acute distress.     Appearance: Normal appearance. She is well-developed. She is obese.   HENT:      Head: Normocephalic.      Right Ear: External ear normal.      Left Ear: External ear normal.      Nose: Nose normal.      Mouth/Throat:      Mouth: Oropharynx is clear and moist. Mucous membranes are moist.      Pharynx: Oropharynx is clear. No oropharyngeal exudate.      Comments: Mallampati score is 2  Eyes:      Conjunctiva/sclera: Conjunctivae normal.      Pupils: Pupils are equal, round, and reactive to light.   Cardiovascular:      Rate and Rhythm: Normal rate and regular rhythm.      Heart sounds: Normal heart sounds.      Comments: Grade 2 systolic ejection murmur  Pulmonary:      Effort: Pulmonary effort is normal.      Comments: Lung sounds are clear.  No wheezes crackles or rhonchi  Abdominal:      General: There is no distension.      Palpations: Abdomen is soft.      Tenderness: There is no abdominal tenderness.   Musculoskeletal:      " Cervical back: Neck supple.      Comments: Has trace to +1 edema in both lower tibial surfaces.  No cyanosis or clubbing   Lymphadenopathy:      Cervical: No cervical adenopathy.   Skin:     General: Skin is warm and dry.   Neurological:      General: No focal deficit present.      Mental Status: She is alert and oriented to person, place, and time.   Psychiatric:         Mood and Affect: Mood and affect and mood normal.         Behavior: Behavior normal.         Thought Content: Thought content normal.         Pulse oximetry:    Room air O2 saturation at rest was 95% and on room air after ambulating 200 feet lowest O2 saturation was 87% but after resting it quickly returned back to 92 to 93%.

## 2024-09-23 NOTE — PATIENT INSTRUCTIONS
Omron 10 series BP cuff about 90 dollars    Continue oxygen 2 l/m at bedtime    Ask cardiologist if you are going to have mitral valve replacement or tricuspid valve    [facility]

## 2024-09-24 ENCOUNTER — TELEPHONE (OUTPATIENT)
Age: 79
End: 2024-09-24

## 2024-09-25 PROBLEM — I34.0 NONRHEUMATIC MITRAL VALVE REGURGITATION: Status: ACTIVE | Noted: 2024-09-25

## 2024-09-26 NOTE — ASSESSMENT & PLAN NOTE
He does have history of sarcoidosis but this is limited disease.  She was diagnosed in 2014 and has no evidence of interstitial lung disease.  She had right cervical lymph node biopsy done back then which showed noncaseating granulomatous disease.  Last PET CT scan done Sara 15, 2023 showed only mild mediastinal and bilateral hilar adenopathy and some mild abdominal retroperitoneal adenopathy with some slight hypermetabolic activity consistent sarcoidosis.  She has not had any steroid therapy for this.

## 2024-09-26 NOTE — ASSESSMENT & PLAN NOTE
She does have history of mild to moderate HERMANN and had overall HI of 25.  She does have sleep-related hypoxemia as well which is prior related to alveolar hypoventilation from obesity and possibly due to her sleep apnea.  She cannot tolerate CPAP therapy.  She does use 3 L of oxygen at bedtime.  [employer] is [employer].  She did have nocturnal oximetry testing done July 2023 with her just using room air at home and has had mild hypoxemia for 4 hours per night with average O2 saturation 88% and oxygen humza 75%.  She is using oxygen on regular basis 2 L at night and sometimes during the day

## 2024-09-26 NOTE — ASSESSMENT & PLAN NOTE
Dianelys did have a TAVR procedure done on 8/15/2024 for severe aortic stenosis this was done at [facility].  She also has persistent severe mitral regurgitation and some pulmonary hypertension with estimated PA pressure of 85 mmHg on last echo done in September 2024.  She she is going to be scheduled for some type of mitral valve procedure

## 2024-09-26 NOTE — ASSESSMENT & PLAN NOTE
Dianelys does have sleep-related hypoxemia due to hypoventilation from obesity and possibly due to underlying PE hypertension and does have some mild to moderate HERMANN but could not tolerate CPAP therapy.  She will continue with oxygen 2 L/min at bedtime.  She periodically does use oxygen during the day when needed.  Room air O2 saturation today is 93%.  DME company is [facility]

## 2024-09-26 NOTE — ASSESSMENT & PLAN NOTE
Dianelys is trying to lose weight.  I did tell her weight loss would probably help with her oxygenation at nighttime.

## 2024-10-18 NOTE — ASSESSMENT & PLAN NOTE
This has been stable  CT of the chest was completed in November 2022 which showed marked mediastinal and bilateral hilar adenopathy that has been unchanged since 2019 and is compatible with the patient's history of sarcoidosis  She is currently not on any treatment  88.9

## 2024-10-28 ENCOUNTER — RA CDI HCC (OUTPATIENT)
Dept: OTHER | Facility: HOSPITAL | Age: 79
End: 2024-10-28

## 2024-10-28 PROBLEM — N17.9 AKI (ACUTE KIDNEY INJURY) (HCC): Status: RESOLVED | Noted: 2023-07-11 | Resolved: 2024-10-28

## 2024-11-04 ENCOUNTER — OFFICE VISIT (OUTPATIENT)
Dept: FAMILY MEDICINE CLINIC | Facility: CLINIC | Age: 79
End: 2024-11-04
Payer: COMMERCIAL

## 2024-11-04 VITALS
RESPIRATION RATE: 21 BRPM | WEIGHT: 209.8 LBS | TEMPERATURE: 97.7 F | HEIGHT: 58 IN | BODY MASS INDEX: 44.04 KG/M2 | OXYGEN SATURATION: 96 % | DIASTOLIC BLOOD PRESSURE: 80 MMHG | SYSTOLIC BLOOD PRESSURE: 130 MMHG | HEART RATE: 84 BPM

## 2024-11-04 DIAGNOSIS — I48.19 PERSISTENT ATRIAL FIBRILLATION (HCC): ICD-10-CM

## 2024-11-04 DIAGNOSIS — Z00.00 MEDICARE ANNUAL WELLNESS VISIT, SUBSEQUENT: Primary | ICD-10-CM

## 2024-11-04 DIAGNOSIS — I34.0 NONRHEUMATIC MITRAL VALVE REGURGITATION: ICD-10-CM

## 2024-11-04 DIAGNOSIS — I27.20 PULMONARY HYPERTENSION (HCC): ICD-10-CM

## 2024-11-04 DIAGNOSIS — B37.9 YEAST INFECTION: ICD-10-CM

## 2024-11-04 DIAGNOSIS — J43.2 CENTRILOBULAR EMPHYSEMA (HCC): ICD-10-CM

## 2024-11-04 DIAGNOSIS — Z98.890 S/P AORTIC VALVE REPAIR: ICD-10-CM

## 2024-11-04 PROCEDURE — G0439 PPPS, SUBSEQ VISIT: HCPCS | Performed by: FAMILY MEDICINE

## 2024-11-04 PROCEDURE — 99214 OFFICE O/P EST MOD 30 MIN: CPT | Performed by: FAMILY MEDICINE

## 2024-11-04 RX ORDER — NYSTATIN 100000 [USP'U]/G
POWDER TOPICAL 4 TIMES DAILY
Qty: 60 G | Refills: 0 | Status: SHIPPED | OUTPATIENT
Start: 2024-11-04

## 2024-11-04 NOTE — ASSESSMENT & PLAN NOTE
I do not believe her shortness of breath is secondary to the emphysema but might benefit 1 L of oxygen when walking     Concerned that patient has significant shortness of breath with ambulation.  I am not sure if 1 L of oxygen might be beneficial.  I did do a 6-minute walk and she did persistently stay between 92 to 94%.  Did advise the patient to talk to her cardiologist about this.  She will be having surgery this upcoming week.  Recommend following up with me after surgery is performed.  Patient  is prone to yeast infection on bilateral breast.

## 2024-11-04 NOTE — PROGRESS NOTES
Ambulatory Visit  Name: Dianelys Kim      : 1945      MRN: [id number]  Encounter Provider: Jaye Curry MD  Encounter Date: 2024   Encounter department: [facility]    Assessment & Plan  Medicare annual wellness visit, subsequent         S/P aortic valve repair  Did not improve her shortness of breath       Nonrheumatic mitral valve regurgitation  Pending valve replacement       Pulmonary hypertension (HCC)  Currently controlled continue medications       Persistent atrial fibrillation (HCC)  Currently stable on medications       Centrilobular emphysema (HCC)  I do not believe her shortness of breath is secondary to the emphysema but might benefit 1 L of oxygen when walking     Concerned that patient has significant shortness of breath with ambulation.  I am not sure if 1 L of oxygen might be beneficial.  I did do a 6-minute walk and she did persistently stay between 92 to 94%.  Did advise the patient to talk to her cardiologist about this.  She will be having surgery this upcoming week.  Recommend following up with me after surgery is performed.  Patient  is prone to yeast infection on bilateral breast.  Yeast infection    Orders:    nystatin (MYCOSTATIN) powder; Apply topically 4 (four) times a day       Preventive health issues were discussed with patient, and age appropriate screening tests were ordered as noted in patient's After Visit Summary. Personalized health advice and appropriate referrals for health education or preventive services given if needed, as noted in patient's After Visit Summary.    History of Present Illness     HPI   79-year-old female presenting to the office for a follow-up on chronic conditions.  Patient states that she has been feeling very tired and very short of breath.  States that the aortic replacement did not help with her symptoms.  Patient states that she has been scheduled to have mitral valve replacement.  Patient is hopeful but  also hesitant that it might not help her symptoms.  Daughter is present in the room today.  And has been helping her with her ADLs.  She is taking all her medications compliantly without any side effects    Patient Care Team:  Jaye Curry MD as PCP - General (Family Medicine)  DO Tyra Sherwood MD (Nephrology)  LILLIANA Gonzalez (Nephrology)    Review of Systems   Constitutional:  Negative for activity change, appetite change, chills, fatigue and fever.   HENT:  Negative for congestion.    Respiratory:  Positive for shortness of breath. Negative for cough and chest tightness.    Cardiovascular:  Negative for chest pain and leg swelling.   Gastrointestinal:  Negative for abdominal distention, abdominal pain, constipation, diarrhea, nausea and vomiting.   All other systems reviewed and are negative.    Medical History Reviewed by provider this encounter:       Annual Wellness Visit Questionnaire   Dianelys is here for her Subsequent Wellness visit.     Health Risk Assessment:   Patient rates overall health as fair. Patient feels that their physical health rating is slightly worse. Patient is satisfied with their life. Eyesight was rated as same. Hearing was rated as same. Patient feels that their emotional and mental health rating is slightly better. Patients states they are never, rarely angry. Patient states they are often unusually tired/fatigued. Pain experienced in the last 7 days has been none. Patient states that she has experienced no weight loss or gain in last 6 months.     Depression Screening:   PHQ-2 Score: 0      Fall Risk Screening:   In the past year, patient has experienced: no history of falling in past year      Urinary Incontinence Screening:   Patient has leaked urine accidently in the last six months.     Home Safety:  Patient has trouble with stairs inside or outside of their home. Patient has working smoke alarms and has working carbon monoxide detector. Home  safety hazards include: none.     Nutrition:   Current diet is Regular.     Medications:   Patient is currently taking over-the-counter supplements. OTC medications include: see medication list. Patient is able to manage medications.     Activities of Daily Living (ADLs)/Instrumental Activities of Daily Living (IADLs):   Walk and transfer into and out of bed and chair?: Yes  Dress and groom yourself?: Yes    Bathe or shower yourself?: Yes    Feed yourself? Yes  Do your laundry/housekeeping?: No  Manage your money, pay your bills and track your expenses?: Yes  Make your own meals?: Yes    Do your own shopping?: Yes    Previous Hospitalizations:   Any hospitalizations or ED visits within the last 12 months?: Yes    How many hospitalizations have you had in the last year?: 1-2    Advance Care Planning:   Living will: Yes    Durable POA for healthcare: Yes    Advanced directive: Yes      Cognitive Screening:   Provider or family/friend/caregiver concerned regarding cognition?: No    PREVENTIVE SCREENINGS      Cardiovascular Screening:    General: Screening Not Indicated and History Lipid Disorder      Diabetes Screening:     General: Screening Current      Colorectal Cancer Screening:     General: Screening Current      Breast Cancer Screening:     General: Screening Not Indicated      Cervical Cancer Screening:    General: Screening Not Indicated      Osteoporosis Screening:    General: Screening Current      Abdominal Aortic Aneurysm (AAA) Screening:        General: Screening Not Indicated      Lung Cancer Screening:     General: Screening Not Indicated      Hepatitis C Screening:    General: Screening Current    Screening, Brief Intervention, and Referral to Treatment (SBIRT)    Screening  Typical number of drinks in a day: 0  Typical number of drinks in a week: 0  Interpretation: Low risk drinking behavior.    Single Item Drug Screening:  How often have you used an illegal drug (including marijuana) or a  "prescription medication for non-medical reasons in the past year? never    Single Item Drug Screen Score: 0  Interpretation: Negative screen for possible drug use disorder    Social Determinants of Health     Food Insecurity: No Food Insecurity (11/4/2024)    Hunger Vital Sign     Worried About Running Out of Food in the Last Year: Never true     Ran Out of Food in the Last Year: Never true   Transportation Needs: No Transportation Needs (11/4/2024)    PRAPARE - Transportation     Lack of Transportation (Medical): No     Lack of Transportation (Non-Medical): No   Housing Stability: Low Risk  (11/4/2024)    Housing Stability Vital Sign     Unable to Pay for Housing in the Last Year: No     Number of Times Moved in the Last Year: 0     Homeless in the Last Year: No   Utilities: Not At Risk (11/4/2024)    [facility]     Threatened with loss of utilities: No     No results found.    Objective     /80 (BP Location: Left arm, Patient Position: Sitting, Cuff Size: Large)   Pulse 84   Temp 97.7 °F (36.5 °C) (Temporal)   Resp 21   Ht 4' 10\" (1.473 m)   Wt 95.2 kg (209 lb 12.8 oz)   SpO2 96%   BMI 43.85 kg/m²     Physical Exam  Vitals reviewed.   Constitutional:       Appearance: Normal appearance. She is well-developed.   HENT:      Head: Normocephalic and atraumatic.      Right Ear: Tympanic membrane, ear canal and external ear normal. There is no impacted cerumen.      Left Ear: Tympanic membrane, ear canal and external ear normal. There is no impacted cerumen.      Nose: Nose normal.      Mouth/Throat:      Mouth: Mucous membranes are moist.      Pharynx: Oropharynx is clear.   Eyes:      Conjunctiva/sclera: Conjunctivae normal.      Pupils: Pupils are equal, round, and reactive to light.   Cardiovascular:      Rate and Rhythm: Normal rate and regular rhythm.      Heart sounds: Normal heart sounds.   Pulmonary:      Effort: Pulmonary effort is normal.      Breath sounds: Normal breath sounds.   Abdominal: "      General: Abdomen is flat. Bowel sounds are normal.      Palpations: Abdomen is soft.   Musculoskeletal:         General: Normal range of motion.      Cervical back: Normal range of motion and neck supple.   Skin:     General: Skin is warm.      Capillary Refill: Capillary refill takes less than 2 seconds.   Neurological:      General: No focal deficit present.      Mental Status: She is alert and oriented to person, place, and time. Mental status is at baseline.   Psychiatric:         Mood and Affect: Mood normal.         Behavior: Behavior normal.         Thought Content: Thought content normal.         Judgment: Judgment normal.

## 2024-11-04 NOTE — PATIENT INSTRUCTIONS
Medicare Preventive Visit Patient Instructions  Thank you for completing your Welcome to Medicare Visit or Medicare Annual Wellness Visit today. Your next wellness visit will be due in one year (11/5/2025).  The screening/preventive services that you may require over the next 5-10 years are detailed below. Some tests may not apply to you based off risk factors and/or age. Screening tests ordered at today's visit but not completed yet may show as past due. Also, please note that scanned in results may not display below.  Preventive Screenings:  Service Recommendations Previous Testing/Comments   Colorectal Cancer Screening  * Colonoscopy    * Fecal Occult Blood Test (FOBT)/Fecal Immunochemical Test (FIT)  * Fecal DNA/Cologuard Test  * Flexible Sigmoidoscopy Age: 45-75 years old   Colonoscopy: every 10 years (may be performed more frequently if at higher risk)  OR  FOBT/FIT: every 1 year  OR  Cologuard: every 3 years  OR  Sigmoidoscopy: every 5 years  Screening may be recommended earlier than age 45 if at higher risk for colorectal cancer. Also, an individualized decision between you and your healthcare provider will decide whether screening between the ages of 76-85 would be appropriate. Colonoscopy: Not on file  FOBT/FIT: Not on file  Cologuard: Not on file  Sigmoidoscopy: Not on file    Screening Current     Breast Cancer Screening Age: 40+ years old  Frequency: every 1-2 years  Not required if history of left and right mastectomy Mammogram: Not on file    Screening Not Indicated   Cervical Cancer Screening Between the ages of 21-29, pap smear recommended once every 3 years.   Between the ages of 30-65, can perform pap smear with HPV co-testing every 5 years.   Recommendations may differ for women with a history of total hysterectomy, cervical cancer, or abnormal pap smears in past. Pap Smear: Not on file    Screening Not Indicated   Hepatitis C Screening Once for adults born between 1945 and 1965  More frequently  in patients at high risk for Hepatitis C Hep C Antibody: Not on file    Screening Current   Diabetes Screening 1-2 times per year if you're at risk for diabetes or have pre-diabetes Fasting glucose: 87 mg/dL (8/30/2024)  A1C: No results in last 5 years (No results in last 5 years)  Screening Current   Cholesterol Screening Once every 5 years if you don't have a lipid disorder. May order more often based on risk factors. Lipid panel: 08/30/2024    Screening Not Indicated  History Lipid Disorder     Other Preventive Screenings Covered by Medicare:  Abdominal Aortic Aneurysm (AAA) Screening: covered once if your at risk. You're considered to be at risk if you have a family history of AAA.  Lung Cancer Screening: covers low dose CT scan once per year if you meet all of the following conditions: (1) Age 55-77; (2) No signs or symptoms of lung cancer; (3) Current smoker or have quit smoking within the last 15 years; (4) You have a tobacco smoking history of at least 20 pack years (packs per day multiplied by number of years you smoked); (5) You get a written order from a healthcare provider.  Glaucoma Screening: covered annually if you're considered high risk: (1) You have diabetes OR (2) Family history of glaucoma OR (3)  aged 50 and older OR (4)  American aged 65 and older  Osteoporosis Screening: covered every 2 years if you meet one of the following conditions: (1) You're estrogen deficient and at risk for osteoporosis based off medical history and other findings; (2) Have a vertebral abnormality; (3) On glucocorticoid therapy for more than 3 months; (4) Have primary hyperparathyroidism; (5) On osteoporosis medications and need to assess response to drug therapy.   Last bone density test (DXA Scan): 02/06/2023.  HIV Screening: covered annually if you're between the age of 15-65. Also covered annually if you are younger than 15 and older than 65 with risk factors for HIV infection. For pregnant  patients, it is covered up to 3 times per pregnancy.    Immunizations:  Immunization Recommendations   Influenza Vaccine Annual influenza vaccination during flu season is recommended for all persons aged >= 6 months who do not have contraindications   Pneumococcal Vaccine   * Pneumococcal conjugate vaccine = PCV13 (Prevnar 13), PCV15 (Vaxneuvance), PCV20 (Prevnar 20)  * Pneumococcal polysaccharide vaccine = PPSV23 (Pneumovax) Adults 19-65 yo with certain risk factors or if 65+ yo  If never received any pneumonia vaccine: recommend Prevnar 20 (PCV20)  Give PCV20 if previously received 1 dose of PCV13 or PPSV23   Hepatitis B Vaccine 3 dose series if at intermediate or high risk (ex: diabetes, end stage renal disease, liver disease)   Respiratory syncytial virus (RSV) Vaccine - COVERED BY MEDICARE PART D  * RSVPreF3 (Arexvy) CDC recommends that adults 60 years of age and older may receive a single dose of RSV vaccine using shared clinical decision-making (SCDM)   Tetanus (Td) Vaccine - COST NOT COVERED BY MEDICARE PART B Following completion of primary series, a booster dose should be given every 10 years to maintain immunity against tetanus. Td may also be given as tetanus wound prophylaxis.   Tdap Vaccine - COST NOT COVERED BY MEDICARE PART B Recommended at least once for all adults. For pregnant patients, recommended with each pregnancy.   Shingles Vaccine (Shingrix) - COST NOT COVERED BY MEDICARE PART B  2 shot series recommended in those 19 years and older who have or will have weakened immune systems or those 50 years and older     Health Maintenance Due:      Topic Date Due   • Hepatitis C Screening  Never done   • Breast Cancer Screening: Mammogram  Never done   • Colorectal Cancer Screening  Discontinued     Immunizations Due:      Topic Date Due   • Influenza Vaccine (1) 09/01/2024   • COVID-19 Vaccine (3 - 2023-24 season) 09/01/2024     Advance Directives   What are advance directives?  Advance directives  are legal documents that state your wishes and plans for medical care. These plans are made ahead of time in case you lose your ability to make decisions for yourself. Advance directives can apply to any medical decision, such as the treatments you want, and if you want to donate organs.   What are the types of advance directives?  There are many types of advance directives, and each state has rules about how to use them. You may choose a combination of any of the following:  Living will:  This is a written record of the treatment you want. You can also choose which treatments you do not want, which to limit, and which to stop at a certain time. This includes surgery, medicine, IV fluid, and tube feedings.   Durable power of  for healthcare (DPAHC):  This is a written record that states who you want to make healthcare choices for you when you are unable to make them for yourself. This person, called a proxy, is usually a family member or a friend. You may choose more than 1 proxy.  Do not resuscitate (DNR) order:  A DNR order is used in case your heart stops beating or you stop breathing. It is a request not to have certain forms of treatment, such as CPR. A DNR order may be included in other types of advance directives.  Medical directive:  This covers the care that you want if you are in a coma, near death, or unable to make decisions for yourself. You can list the treatments you want for each condition. Treatment may include pain medicine, surgery, blood transfusions, dialysis, IV or tube feedings, and a ventilator (breathing machine).  Values history:  This document has questions about your views, beliefs, and how you feel and think about life. This information can help others choose the care that you would choose.  Why are advance directives important?  An advance directive helps you control your care. Although spoken wishes may be used, it is better to have your wishes written down. Spoken wishes can  be misunderstood, or not followed. Treatments may be given even if you do not want them. An advance directive may make it easier for your family to make difficult choices about your care.   Urinary Incontinence   Urinary incontinence (UI)  is when you lose control of your bladder. UI develops because your bladder cannot store or empty urine properly. The 3 most common types of UI are stress incontinence, urge incontinence, or both.  Medicines:   May be given to help strengthen your bladder control. Report any side effects of medication to your healthcare provider.  Do pelvic muscle exercises often:  Your pelvic muscles help you stop urinating. Squeeze these muscles tight for 5 seconds, then relax for 5 seconds. Gradually work up to squeezing for 10 seconds. Do 3 sets of 15 repetitions a day, or as directed. This will help strengthen your pelvic muscles and improve bladder control.  Train your bladder:  Go to the bathroom at set times, such as every 2 hours, even if you do not feel the urge to go. You can also try to hold your urine when you feel the urge to go. For example, hold your urine for 5 minutes when you feel the urge to go. As that becomes easier, hold your urine for 10 minutes.   Self-care:   Keep a UI record.  Write down how often you leak urine and how much you leak. Make a note of what you were doing when you leaked urine.  Drink liquids as directed. You may need to limit the amount of liquid you drink to help control your urine leakage. Do not drink any liquid right before you go to bed. Limit or do not have drinks that contain caffeine or alcohol.   Prevent constipation.  Eat a variety of high-fiber foods. Good examples are high-fiber cereals, beans, vegetables, and whole-grain breads. Walking is the best way to trigger your intestines to have a bowel movement.  Exercise regularly and maintain a healthy weight.  Weight loss and exercise will decrease pressure on your bladder and help you control your  leakage.   Use a catheter as directed  to help empty your bladder. A catheter is a tiny, plastic tube that is put into your bladder to drain your urine.   Go to behavior therapy as directed.  Behavior therapy may be used to help you learn to control your urge to urinate.    Weight Management   Why it is important to manage your weight:  Being overweight increases your risk of health conditions such as heart disease, high blood pressure, type 2 diabetes, and certain types of cancer. It can also increase your risk for osteoarthritis, sleep apnea, and other respiratory problems. Aim for a slow, steady weight loss. Even a small amount of weight loss can lower your risk of health problems.  How to lose weight safely:  A safe and healthy way to lose weight is to eat fewer calories and get regular exercise. You can lose up about 1 pound a week by decreasing the number of calories you eat by 500 calories each day.   Healthy meal plan for weight management:  A healthy meal plan includes a variety of foods, contains fewer calories, and helps you stay healthy. A healthy meal plan includes the following:  Eat whole-grain foods more often.  A healthy meal plan should contain fiber. Fiber is the part of grains, fruits, and vegetables that is not broken down by your body. Whole-grain foods are healthy and provide extra fiber in your diet. Some examples of whole-grain foods are whole-wheat breads and pastas, oatmeal, brown rice, and bulgur.  Eat a variety of vegetables every day.  Include dark, leafy greens such as spinach, kale, arslan greens, and mustard greens. Eat yellow and orange vegetables such as carrots, sweet potatoes, and winter squash.   Eat a variety of fruits every day.  Choose fresh or canned fruit (canned in its own juice or light syrup) instead of juice. Fruit juice has very little or no fiber.  Eat low-fat dairy foods.  Drink fat-free (skim) milk or 1% milk. Eat fat-free yogurt and low-fat cottage cheese. Try  low-fat cheeses such as mozzarella and other reduced-fat cheeses.  Choose meat and other protein foods that are low in fat.  Choose beans or other legumes such as split peas or lentils. Choose fish, skinless poultry (chicken or turkey), or lean cuts of red meat (beef or pork). Before you cook meat or poultry, cut off any visible fat.   Use less fat and oil.  Try baking foods instead of frying them. Add less fat, such as margarine, sour cream, regular salad dressing and mayonnaise to foods. Eat fewer high-fat foods. Some examples of high-fat foods include french fries, doughnuts, ice cream, and cakes.  Eat fewer sweets.  Limit foods and drinks that are high in sugar. This includes candy, cookies, regular soda, and sweetened drinks.  Exercise:  Exercise at least 30 minutes per day on most days of the week. Some examples of exercise include walking, biking, dancing, and swimming. You can also fit in more physical activity by taking the stairs instead of the elevator or parking farther away from stores. Ask your healthcare provider about the best exercise plan for you.      © Copyright [employer] 2018 Information is for End User's use only and may not be sold, redistributed or otherwise used for commercial purposes. All illustrations and images included in CareNotes® are the copyrighted property of [employer] Inc[employer] or [employer]

## 2024-11-13 DIAGNOSIS — E03.9 HYPOTHYROIDISM, UNSPECIFIED TYPE: ICD-10-CM

## 2024-11-13 DIAGNOSIS — I48.91 ATRIAL FIBRILLATION, UNSPECIFIED TYPE (HCC): ICD-10-CM

## 2024-11-14 RX ORDER — LEVOTHYROXINE SODIUM 50 UG/1
50 TABLET ORAL DAILY
Qty: 90 TABLET | Refills: 1 | Status: SHIPPED | OUTPATIENT
Start: 2024-11-14

## 2024-11-14 RX ORDER — METOPROLOL SUCCINATE 50 MG/1
50 TABLET, EXTENDED RELEASE ORAL DAILY
Qty: 90 TABLET | Refills: 1 | Status: SHIPPED | OUTPATIENT
Start: 2024-11-14

## 2024-11-18 DIAGNOSIS — B37.9 YEAST INFECTION: ICD-10-CM

## 2024-11-19 RX ORDER — NYSTATIN 100000 [USP'U]/G
POWDER TOPICAL
Qty: 60 G | Refills: 0 | Status: SHIPPED | OUTPATIENT
Start: 2024-11-19

## 2024-12-04 LAB
25(OH)D3+25(OH)D2 SERPL-MCNC: 28.6 NG/ML (ref 30–100)
ALBUMIN SERPL-MCNC: 4.3 G/DL (ref 3.8–4.8)
ALBUMIN/CREAT UR: 102 MG/G CREAT (ref 0–29)
APPEARANCE UR: CLEAR
BACTERIA URNS QL MICRO: NORMAL
BASOPHILS # BLD AUTO: 0 X10E3/UL (ref 0–0.2)
BASOPHILS NFR BLD AUTO: 0 %
BILIRUB UR QL STRIP: NEGATIVE
BUN SERPL-MCNC: 24 MG/DL (ref 8–27)
BUN/CREAT SERPL: 18 (ref 12–28)
CALCIUM SERPL-MCNC: 9.7 MG/DL (ref 8.7–10.3)
CASTS URNS QL MICRO: NORMAL /LPF
CHLORIDE SERPL-SCNC: 102 MMOL/L (ref 96–106)
CO2 SERPL-SCNC: 20 MMOL/L (ref 20–29)
COLOR UR: YELLOW
CREAT SERPL-MCNC: 1.3 MG/DL (ref 0.57–1)
CREAT UR-MCNC: 23.3 MG/DL
EGFR: 42 ML/MIN/1.73
EOSINOPHIL # BLD AUTO: 0.2 X10E3/UL (ref 0–0.4)
EOSINOPHIL NFR BLD AUTO: 2 %
EPI CELLS #/AREA URNS HPF: NORMAL /HPF (ref 0–10)
ERYTHROCYTE [DISTWIDTH] IN BLOOD BY AUTOMATED COUNT: 14.4 % (ref 11.7–15.4)
GLUCOSE SERPL-MCNC: 95 MG/DL (ref 70–99)
GLUCOSE UR QL: NEGATIVE
HCT VFR BLD AUTO: 40.4 % (ref 34–46.6)
HGB BLD-MCNC: 13.4 G/DL (ref 11.1–15.9)
HGB UR QL STRIP: NEGATIVE
IMM GRANULOCYTES # BLD: 0 X10E3/UL (ref 0–0.1)
IMM GRANULOCYTES NFR BLD: 0 %
KETONES UR QL STRIP: NEGATIVE
LEUKOCYTE ESTERASE UR QL STRIP: NEGATIVE
LYMPHOCYTES # BLD AUTO: 0.4 X10E3/UL (ref 0.7–3.1)
LYMPHOCYTES NFR BLD AUTO: 6 %
MAGNESIUM SERPL-MCNC: 1.9 MG/DL (ref 1.6–2.3)
MCH RBC QN AUTO: 30.7 PG (ref 26.6–33)
MCHC RBC AUTO-ENTMCNC: 33.2 G/DL (ref 31.5–35.7)
MCV RBC AUTO: 93 FL (ref 79–97)
MICRO URNS: NORMAL
MICRO URNS: NORMAL
MICROALBUMIN UR-MCNC: 23.7 UG/ML
MONOCYTES # BLD AUTO: 0.6 X10E3/UL (ref 0.1–0.9)
MONOCYTES NFR BLD AUTO: 10 %
NEUTROPHILS # BLD AUTO: 5.5 X10E3/UL (ref 1.4–7)
NEUTROPHILS NFR BLD AUTO: 82 %
NITRITE UR QL STRIP: NEGATIVE
PH UR STRIP: 7 [PH] (ref 5–7.5)
PHOSPHATE SERPL-MCNC: 3.4 MG/DL (ref 3–4.3)
PLATELET # BLD AUTO: 203 X10E3/UL (ref 150–450)
POTASSIUM SERPL-SCNC: 3.8 MMOL/L (ref 3.5–5.2)
PROT UR QL STRIP: NEGATIVE
PTH-INTACT SERPL-MCNC: 40 PG/ML (ref 15–65)
RBC # BLD AUTO: 4.36 X10E6/UL (ref 3.77–5.28)
RBC #/AREA URNS HPF: NORMAL /HPF (ref 0–2)
SODIUM SERPL-SCNC: 142 MMOL/L (ref 134–144)
SP GR UR: 1.01 (ref 1–1.03)
UROBILINOGEN UR STRIP-ACNC: 0.2 MG/DL (ref 0.2–1)
WBC # BLD AUTO: 6.7 X10E3/UL (ref 3.4–10.8)
WBC #/AREA URNS HPF: NORMAL /HPF (ref 0–5)

## 2024-12-05 ENCOUNTER — RESULTS FOLLOW-UP (OUTPATIENT)
Dept: NEPHROLOGY | Facility: CLINIC | Age: 79
End: 2024-12-05

## 2024-12-05 NOTE — RESULT ENCOUNTER NOTE
Labs reviewed.  Please let Dianelys know that labs are stable.  Renal function is at baseline.  Vitamin D level is slightly low.  She should take a vitamin D supplement.  Over-the-counter vitamin D3 1000 units daily should be sufficient.

## 2024-12-06 RX ORDER — VITAMIN K2 90 MCG
CAPSULE ORAL DAILY
COMMUNITY

## 2024-12-06 NOTE — TELEPHONE ENCOUNTER
Pt advised that labs show stable kidney function.  Vitamin D level low.  Per Jimena, to start OTC Vitamin D3 1000 Units daily.    ----- Message from LILLIANA Simmons sent at 12/5/2024  4:45 PM EST -----  Labs reviewed.  Please let Dianelys know that labs are stable.  Renal function is at baseline.  Vitamin D level is slightly low.  She should take a vitamin D supplement.  Over-the-counter vitamin D3 1000 units daily should be sufficient.

## 2024-12-09 DIAGNOSIS — B37.9 YEAST INFECTION: ICD-10-CM

## 2024-12-10 RX ORDER — NYSTATIN 100000 [USP'U]/G
POWDER TOPICAL
Qty: 60 G | Refills: 0 | Status: SHIPPED | OUTPATIENT
Start: 2024-12-10

## 2025-01-19 DIAGNOSIS — E78.5 HYPERLIPIDEMIA, UNSPECIFIED HYPERLIPIDEMIA TYPE: ICD-10-CM

## 2025-01-20 RX ORDER — ATORVASTATIN CALCIUM 10 MG/1
10 TABLET, FILM COATED ORAL DAILY
Qty: 90 TABLET | Refills: 1 | Status: SHIPPED | OUTPATIENT
Start: 2025-01-20

## 2025-02-04 ENCOUNTER — APPOINTMENT (OUTPATIENT)
Dept: LAB | Facility: CLINIC | Age: 80
End: 2025-02-04
Payer: COMMERCIAL

## 2025-02-04 DIAGNOSIS — I12.9 BENIGN HYPERTENSION WITH CHRONIC KIDNEY DISEASE, STAGE III (HCC): ICD-10-CM

## 2025-02-04 DIAGNOSIS — R06.02 SHORTNESS OF BREATH: ICD-10-CM

## 2025-02-04 DIAGNOSIS — N18.30 BENIGN HYPERTENSION WITH CHRONIC KIDNEY DISEASE, STAGE III (HCC): ICD-10-CM

## 2025-02-04 DIAGNOSIS — N18.30 STAGE 3 CHRONIC KIDNEY DISEASE, UNSPECIFIED WHETHER STAGE 3A OR 3B CKD (HCC): ICD-10-CM

## 2025-02-04 DIAGNOSIS — I27.20 PULMONARY HYPERTENSION (HCC): ICD-10-CM

## 2025-02-04 DIAGNOSIS — I50.32 CHRONIC DIASTOLIC HEART FAILURE (HCC): ICD-10-CM

## 2025-02-04 LAB
25(OH)D3 SERPL-MCNC: 35 NG/ML (ref 30–100)
BNP SERPL-MCNC: 435 PG/ML (ref 0–100)
MAGNESIUM SERPL-MCNC: 1.9 MG/DL (ref 1.9–2.7)
PHOSPHATE SERPL-MCNC: 3.2 MG/DL (ref 2.3–4.1)
PTH-INTACT SERPL-MCNC: 150.3 PG/ML (ref 12–88)

## 2025-02-04 PROCEDURE — 83880 ASSAY OF NATRIURETIC PEPTIDE: CPT

## 2025-02-04 PROCEDURE — 83735 ASSAY OF MAGNESIUM: CPT

## 2025-02-04 PROCEDURE — 82306 VITAMIN D 25 HYDROXY: CPT

## 2025-02-04 PROCEDURE — 83970 ASSAY OF PARATHORMONE: CPT

## 2025-02-04 PROCEDURE — 84100 ASSAY OF PHOSPHORUS: CPT

## 2025-02-04 PROCEDURE — 36415 COLL VENOUS BLD VENIPUNCTURE: CPT

## 2025-02-05 ENCOUNTER — RESULTS FOLLOW-UP (OUTPATIENT)
Dept: OTHER | Facility: HOSPITAL | Age: 80
End: 2025-02-05

## 2025-02-05 NOTE — RESULT ENCOUNTER NOTE
Please let Dianelys know that labs are stable. Vitamin D level is normal now.   She will see Dr Childers in March

## 2025-02-05 NOTE — TELEPHONE ENCOUNTER
Spoke with patient about the following, she expressed understanding and thanked us for the call:    ----- Message from LILLIANA Simmons sent at 2/5/2025  4:05 PM EST -----  Please let Dianelys know that labs are stable. Vitamin D level is normal now.   She will see Dr Childers in March

## 2025-03-04 ENCOUNTER — TELEPHONE (OUTPATIENT)
Age: 80
End: 2025-03-04

## 2025-03-04 NOTE — TELEPHONE ENCOUNTER
Cami from [facility] called and inquired the patient having a COPD diagnosis and no mention of medication for it.  Fax will be coming through with her concerns. Greg pagee.

## 2025-03-24 ENCOUNTER — OFFICE VISIT (OUTPATIENT)
Dept: NEPHROLOGY | Facility: CLINIC | Age: 80
End: 2025-03-24
Payer: COMMERCIAL

## 2025-03-24 VITALS
OXYGEN SATURATION: 94 % | DIASTOLIC BLOOD PRESSURE: 76 MMHG | SYSTOLIC BLOOD PRESSURE: 114 MMHG | BODY MASS INDEX: 43.45 KG/M2 | WEIGHT: 207 LBS | HEART RATE: 86 BPM | HEIGHT: 58 IN

## 2025-03-24 DIAGNOSIS — E66.813 CLASS 3 SEVERE OBESITY DUE TO EXCESS CALORIES WITH SERIOUS COMORBIDITY AND BODY MASS INDEX (BMI) OF 40.0 TO 44.9 IN ADULT (HCC): ICD-10-CM

## 2025-03-24 DIAGNOSIS — I48.19 PERSISTENT ATRIAL FIBRILLATION (HCC): ICD-10-CM

## 2025-03-24 DIAGNOSIS — Z71.2 ENCOUNTER TO DISCUSS TEST RESULTS: ICD-10-CM

## 2025-03-24 DIAGNOSIS — I27.20 PULMONARY HYPERTENSION (HCC): ICD-10-CM

## 2025-03-24 DIAGNOSIS — M89.9 CHRONIC KIDNEY DISEASE-MINERAL BONE DISORDER (CKD-MBD) WITH STAGE 3B CHRONIC KIDNEY DISEASE (HCC): ICD-10-CM

## 2025-03-24 DIAGNOSIS — I12.9 BENIGN HYPERTENSION WITH CHRONIC KIDNEY DISEASE, STAGE III (HCC): Primary | ICD-10-CM

## 2025-03-24 DIAGNOSIS — E66.01 CLASS 3 SEVERE OBESITY DUE TO EXCESS CALORIES WITH SERIOUS COMORBIDITY AND BODY MASS INDEX (BMI) OF 40.0 TO 44.9 IN ADULT (HCC): ICD-10-CM

## 2025-03-24 DIAGNOSIS — E83.9 CHRONIC KIDNEY DISEASE-MINERAL BONE DISORDER (CKD-MBD) WITH STAGE 3B CHRONIC KIDNEY DISEASE (HCC): ICD-10-CM

## 2025-03-24 DIAGNOSIS — N18.32 CHRONIC KIDNEY DISEASE-MINERAL BONE DISORDER (CKD-MBD) WITH STAGE 3B CHRONIC KIDNEY DISEASE (HCC): ICD-10-CM

## 2025-03-24 DIAGNOSIS — J43.2 CENTRILOBULAR EMPHYSEMA (HCC): Chronic | ICD-10-CM

## 2025-03-24 DIAGNOSIS — N18.30 BENIGN HYPERTENSION WITH CHRONIC KIDNEY DISEASE, STAGE III (HCC): Primary | ICD-10-CM

## 2025-03-24 DIAGNOSIS — I25.5 ISCHEMIC CARDIOMYOPATHY: ICD-10-CM

## 2025-03-24 PROBLEM — E83.52 HYPERCALCEMIA: Status: RESOLVED | Noted: 2023-07-11 | Resolved: 2025-03-24

## 2025-03-24 PROBLEM — N18.4 CHRONIC RENAL DISEASE, STAGE IV (HCC): Status: RESOLVED | Noted: 2024-01-26 | Resolved: 2025-03-24

## 2025-03-24 PROBLEM — E87.1 HYPONATREMIA: Status: RESOLVED | Noted: 2023-07-11 | Resolved: 2025-03-24

## 2025-03-24 PROBLEM — E87.5 HYPERKALEMIA: Status: RESOLVED | Noted: 2023-07-11 | Resolved: 2025-03-24

## 2025-03-24 PROBLEM — N18.31 STAGE 3A CHRONIC KIDNEY DISEASE (HCC): Status: RESOLVED | Noted: 2022-11-01 | Resolved: 2025-03-24

## 2025-03-24 PROCEDURE — G2211 COMPLEX E/M VISIT ADD ON: HCPCS | Performed by: INTERNAL MEDICINE

## 2025-03-24 PROCEDURE — 99214 OFFICE O/P EST MOD 30 MIN: CPT | Performed by: INTERNAL MEDICINE

## 2025-03-24 NOTE — ASSESSMENT & PLAN NOTE
-- Continue rate control with metoprolol  --Continue anticoagulation with Eliquis    Orders:    CBC w/o differential; Future    Albumin / creatinine urine ratio; Future    PTH, intact; Future    Vitamin D 25 hydroxy; Future    Phosphorus; Future    Comprehensive metabolic panel; Future    Uric acid; Future    Blood gas, venous; Future

## 2025-03-24 NOTE — ASSESSMENT & PLAN NOTE
Lab Results   Component Value Date    EGFR 36 02/04/2025    EGFR 42 (L) 12/02/2024    EGFR 42 08/30/2024    CREATININE 1.38 (H) 02/04/2025    CREATININE 1.30 (H) 12/02/2024    CREATININE 1.21 08/30/2024   -- PTH has increased to 150, vitamin D 25-hydroxy level stable at 35  -- Calcium and phosphorus are normal  -- Will continue to trend PTH at this time  -- If he continues to rise we will consider adding calcitriol    Orders:    CBC w/o differential; Future    Albumin / creatinine urine ratio; Future    PTH, intact; Future    Vitamin D 25 hydroxy; Future    Phosphorus; Future    Comprehensive metabolic panel; Future    Uric acid; Future    Blood gas, venous; Future

## 2025-03-24 NOTE — ASSESSMENT & PLAN NOTE
-- Preserved ejection fraction of 55% moderate aortic stenosis  --AICD in place  --Currently on Entresto, metoprolol, atorvastatin, torsemide  --No longer on spironolactone  --Low 2 g sodium diet Daily weights and fluid restriction  --Continue current management  --Currently compensated    Orders:    CBC w/o differential; Future    Albumin / creatinine urine ratio; Future    PTH, intact; Future    Vitamin D 25 hydroxy; Future    Phosphorus; Future    Comprehensive metabolic panel; Future    Uric acid; Future    Blood gas, venous; Future

## 2025-03-24 NOTE — ASSESSMENT & PLAN NOTE
Lab Results   Component Value Date    EGFR 36 02/04/2025    EGFR 42 (L) 12/02/2024    EGFR 42 08/30/2024    CREATININE 1.38 (H) 02/04/2025    CREATININE 1.30 (H) 12/02/2024    CREATININE 1.21 08/30/2024     Chronic kidney disease stage IIIB (G3bA1)  -- Baseline creatinine 1.4 to 1.7 mg/dL  -- Urine analysis bland  -- Renal imaging showed no hydronephrosis or masses.  -- Presumed to be secondary to hypertensive nephrosclerosis, arteriolar sclerosis, cardiopulmonary syndrome  -- Renal function stable and at baseline creatinine at 1.3 mg/dL with a GFR grain 30 mL/min.  -- Continue RAAS blockade with Entresto  -- Continue torsemide 20 mg daily  -- Avoid NSAIDs    Hypertension  -- Low 2 g sodium diet  -- Daily weight  -- BMI 43.26  -- Emphasize lifestyle modifications including weight loss exercise and diet  -- Blood pressure at target  -- Positive edema noted continue torsemide 20 mg daily  -- Continue Entresto 24-26 1 tablet twice a day  -- Metoprolol 25 mg twice a day  -- Target blood pressure less than 130/80    Orders:    CBC w/o differential; Future    Albumin / creatinine urine ratio; Future    PTH, intact; Future    Vitamin D 25 hydroxy; Future    Phosphorus; Future    Comprehensive metabolic panel; Future    Uric acid; Future    Blood gas, venous; Future

## 2025-03-24 NOTE — ASSESSMENT & PLAN NOTE
-- We discussed her blood work results from February 4 which included CMP BNP magnesium PTH vitamin D 25-hydroxy level and phosphorus.  Also discussed her CMP from February 19 along with CBC  --CBC showed normal hemoglobin 14.6 with a hematocrit of 45.7  --GFR grain 30 mL/min  --Phosphorus is normal magnesium is normal  --.3 which is an increase  --BNP is increased to 435  --Renal function stable with a creatinine 1.3 mg/dL    Orders:    CBC w/o differential; Future    Albumin / creatinine urine ratio; Future    PTH, intact; Future    Vitamin D 25 hydroxy; Future    Phosphorus; Future    Comprehensive metabolic panel; Future    Uric acid; Future    Blood gas, venous; Future

## 2025-03-24 NOTE — PROGRESS NOTES
Name: Dianelys Kim      : 1945      MRN: [id number]  Encounter Provider: Tyra Childers MD  Encounter Date: 3/24/2025   Encounter department: [facility]  :  Assessment & Plan  Benign hypertension with chronic kidney disease, stage III (HCC)  Lab Results   Component Value Date    EGFR 36 2025    EGFR 42 (L) 2024    EGFR 42 2024    CREATININE 1.38 (H) 2025    CREATININE 1.30 (H) 2024    CREATININE 1.21 2024     Chronic kidney disease stage IIIB (G3bA1)  -- Baseline creatinine 1.4 to 1.7 mg/dL  -- Urine analysis bland  -- Renal imaging showed no hydronephrosis or masses.  -- Presumed to be secondary to hypertensive nephrosclerosis, arteriolar sclerosis, cardiopulmonary syndrome  -- Renal function stable and at baseline creatinine at 1.3 mg/dL with a GFR grain 30 mL/min.  -- Continue RAAS blockade with Entresto  -- Continue torsemide 20 mg daily  -- Avoid NSAIDs    Hypertension  -- Low 2 g sodium diet  -- Daily weight  -- BMI 43.26  -- Emphasize lifestyle modifications including weight loss exercise and diet  -- Blood pressure at target  -- Positive edema noted continue torsemide 20 mg daily  -- Continue Entresto 24-26 1 tablet twice a day  -- Metoprolol 25 mg twice a day  -- Target blood pressure less than 130/80    Orders:    CBC w/o differential; Future    Albumin / creatinine urine ratio; Future    PTH, intact; Future    Vitamin D 25 hydroxy; Future    Phosphorus; Future    Comprehensive metabolic panel; Future    Uric acid; Future    Blood gas, venous; Future    Ischemic cardiomyopathy  -- Preserved ejection fraction of 55% moderate aortic stenosis  --AICD in place  --Currently on Entresto, metoprolol, atorvastatin, torsemide  --No longer on spironolactone  --Low 2 g sodium diet Daily weights and fluid restriction  --Continue current management  --Currently compensated    Orders:    CBC w/o differential; Future    Albumin / creatinine urine  ratio; Future    PTH, intact; Future    Vitamin D 25 hydroxy; Future    Phosphorus; Future    Comprehensive metabolic panel; Future    Uric acid; Future    Blood gas, venous; Future    Persistent atrial fibrillation (HCC)  -- Continue rate control with metoprolol  --Continue anticoagulation with Eliquis    Orders:    CBC w/o differential; Future    Albumin / creatinine urine ratio; Future    PTH, intact; Future    Vitamin D 25 hydroxy; Future    Phosphorus; Future    Comprehensive metabolic panel; Future    Uric acid; Future    Blood gas, venous; Future    Centrilobular emphysema (HCC)  -- Non-smoker quit many years ago  --Saturating 100% on room air  Orders:    CBC w/o differential; Future    Albumin / creatinine urine ratio; Future    PTH, intact; Future    Vitamin D 25 hydroxy; Future    Phosphorus; Future    Comprehensive metabolic panel; Future    Uric acid; Future    Blood gas, venous; Future    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 (HCC)  Lab Results   Component Value Date    EGFR 36 02/04/2025    EGFR 42 (L) 12/02/2024    EGFR 42 08/30/2024    CREATININE 1.38 (H) 02/04/2025    CREATININE 1.30 (H) 12/02/2024    CREATININE 1.21 08/30/2024   -- PTH has increased to 150, vitamin D 25-hydroxy level stable at 35  -- Calcium and phosphorus are normal  -- Will continue to trend PTH at this time  -- If he continues to rise we will consider adding calcitriol    Orders:    CBC w/o differential; Future    Albumin / creatinine urine ratio; Future    PTH, intact; Future    Vitamin D 25 hydroxy; Future    Phosphorus; Future    Comprehensive metabolic panel; Future    Uric acid; Future    Blood gas, venous; Future    Encounter to discuss test results  -- We discussed her blood work results from February 4 which included CMP BNP magnesium PTH vitamin D 25-hydroxy level and phosphorus.  Also discussed her CMP from February 19 along with CBC  --CBC showed normal hemoglobin  14.6 with a hematocrit of 45.7  --GFR grain 30 mL/min  --Phosphorus is normal magnesium is normal  --.3 which is an increase  --BNP is increased to 435  --Renal function stable with a creatinine 1.3 mg/dL    Orders:    CBC w/o differential; Future    Albumin / creatinine urine ratio; Future    PTH, intact; Future    Vitamin D 25 hydroxy; Future    Phosphorus; Future    Comprehensive metabolic panel; Future    Uric acid; Future    Blood gas, venous; Future    Class 3 severe obesity due to excess calories with serious comorbidity and body mass index (BMI) of 40.0 to 44.9 in adult (HCC)  -- BMI 43.26 will fluctuate based on volume status    Orders:    CBC w/o differential; Future    Albumin / creatinine urine ratio; Future    PTH, intact; Future    Vitamin D 25 hydroxy; Future    Phosphorus; Future    Comprehensive metabolic panel; Future    Uric acid; Future    Blood gas, venous; Future    Pulmonary hypertension (HCC)  -- Continue diuresis           Patient Instructions   1.)  Low 2 g sodium diet    2.)  Monitor weights at home    3.)  Avoid NSAIDs (ibuprofen, Motrin, Advil, Aleve, naproxen)    4.)  Monitor blood pressure at home, call if blood pressure greater than 150/90 persistently    5.) I will plan to discuss all results including blood work, and/or imaging at our next visit, unless there is an urgent indication, in which case I will call you earlier. If you have any questions or concerns about your results, please feel free to call our office.          It was a pleasure evaluating your patient in the office today. Thank you for allowing our team to participate in the care of  Dianelys Kim. Please do not hesitate to contact our team if further issues/questions shall arise in the interim.     History of Present Illness   HPI  Dianelys Kim is a 79 y.o. female who presents chronic kidney disease stage III and hypertension.    Since her last visit no ER visits or hospitalizations.    I recently saw this  patient in the hospital when her daughter was ill with renal failure and we discussed about her daughter who is still currently in the hospital.    Patient currently feels fine has no complaints no chest pain or shortness of breath.  She has not taken her torsemide as of yet    We discussed her blood work and reviewed all of her medications together.    I described about the PTH and physiology of the parathyroid glands.    History obtained from: patient  Pertinent Medical History         Review of Systems   Constitutional:  Negative for activity change and fever.   Respiratory:  Negative for cough, chest tightness, shortness of breath and wheezing.    Cardiovascular:  Negative for chest pain and leg swelling.   Gastrointestinal:  Negative for abdominal pain, diarrhea, nausea and vomiting.   Endocrine: Negative for polyuria.   Genitourinary:  Negative for difficulty urinating, dysuria, flank pain, frequency and urgency.   Skin:  Negative for rash.   Neurological:  Negative for dizziness, syncope, light-headedness and headaches.     Medical History Reviewed by provider this encounter:     .  Current Outpatient Medications on File Prior to Visit   Medication Sig Dispense Refill    apixaban (ELIQUIS) 5 mg Take 5 mg by mouth 2 (two) times a day 6/2/2022 stopped on 5/31/2022 for Procedure      atorvastatin (LIPITOR) 10 mg tablet take 1 tablet by mouth once daily 90 tablet 1    Cholecalciferol (Vitamin D3) 1000 units CAPS Take by mouth in the morning      Entresto 24-26 MG TABS Take 1 tablet by mouth 2 (two) times a day      levothyroxine 50 mcg tablet take 1 tablet by mouth once daily 90 tablet 1    metoprolol succinate (TOPROL-XL) 25 mg 24 hr tablet Take 25 mg by mouth daily (Patient taking differently: Take 25 mg by mouth 2 (two) times a day)      oxygen gas Inhale daily at bedtime      torsemide (DEMADEX) 20 mg tablet Take 20 mg by mouth daily      Acetaminophen 80 MG TBDP Take by mouth if needed      aspirin (ECOTRIN  LOW STRENGTH) 81 mg EC tablet Take 81 mg by mouth if needed (Patient not taking: Reported on 4/15/2024)      Cyanocobalamin (Vitamin B 12) 100 MCG LOZG Take 5,000 mcg by mouth in the morning (Patient not taking: Reported on 3/24/2025)      Entresto 49-51 MG TABS Take by mouth 2 (two) times a day TAKE 24-26 MG TWICE A DAY (Patient not taking: Reported on 9/23/2024)      furosemide (LASIX) 40 mg tablet Take 40 mg by mouth daily (Patient not taking: Reported on 3/24/2025)      Glucosamine Sulfate 500 MG TABS Take by mouth daily (Patient not taking: Reported on 8/20/2024)      glucosamine-chondroitin 500-400 MG tablet Take 1 tablet by mouth daily  (Patient not taking: Reported on 8/20/2024)      metoprolol succinate (TOPROL-XL) 50 mg 24 hr tablet take 1 tablet by mouth once daily (Patient not taking: Reported on 3/24/2025) 90 tablet 1    Multiple Vitamins-Minerals (HAIR SKIN AND NAILS FORMULA PO) Take by mouth (Patient not taking: Reported on 4/15/2024)      nystatin powder apply topically four times a day 60 g 0     No current facility-administered medications on file prior to visit.         Current Outpatient Medications on File Prior to Visit   Medication Sig Dispense Refill    apixaban (ELIQUIS) 5 mg Take 5 mg by mouth 2 (two) times a day 6/2/2022 stopped on 5/31/2022 for Procedure      atorvastatin (LIPITOR) 10 mg tablet take 1 tablet by mouth once daily 90 tablet 1    Cholecalciferol (Vitamin D3) 1000 units CAPS Take by mouth in the morning      Entresto 24-26 MG TABS Take 1 tablet by mouth 2 (two) times a day      levothyroxine 50 mcg tablet take 1 tablet by mouth once daily 90 tablet 1    metoprolol succinate (TOPROL-XL) 25 mg 24 hr tablet Take 25 mg by mouth daily (Patient taking differently: Take 25 mg by mouth 2 (two) times a day)      oxygen gas Inhale daily at bedtime      torsemide (DEMADEX) 20 mg tablet Take 20 mg by mouth daily      Acetaminophen 80 MG TBDP Take by mouth if needed      aspirin (ECOTRIN  "LOW STRENGTH) 81 mg EC tablet Take 81 mg by mouth if needed (Patient not taking: Reported on 4/15/2024)      Cyanocobalamin (Vitamin B 12) 100 MCG LOZG Take 5,000 mcg by mouth in the morning (Patient not taking: Reported on 3/24/2025)      Entresto 49-51 MG TABS Take by mouth 2 (two) times a day TAKE 24-26 MG TWICE A DAY (Patient not taking: Reported on 9/23/2024)      furosemide (LASIX) 40 mg tablet Take 40 mg by mouth daily (Patient not taking: Reported on 3/24/2025)      Glucosamine Sulfate 500 MG TABS Take by mouth daily (Patient not taking: Reported on 8/20/2024)      glucosamine-chondroitin 500-400 MG tablet Take 1 tablet by mouth daily  (Patient not taking: Reported on 8/20/2024)      metoprolol succinate (TOPROL-XL) 50 mg 24 hr tablet take 1 tablet by mouth once daily (Patient not taking: Reported on 3/24/2025) 90 tablet 1    Multiple Vitamins-Minerals (HAIR SKIN AND NAILS FORMULA PO) Take by mouth (Patient not taking: Reported on 4/15/2024)      nystatin powder apply topically four times a day 60 g 0     No current facility-administered medications on file prior to visit.     Objective   /76 (BP Location: Left arm, Patient Position: Sitting, Cuff Size: Large)   Pulse 86   Ht 4' 10\" (1.473 m)   Wt 93.9 kg (207 lb)   SpO2 94%   BMI 43.26 kg/m²      Physical Exam  Vitals and nursing note reviewed.   Constitutional:       General: She is not in acute distress.     Appearance: She is well-developed. She is obese. She is not diaphoretic.   HENT:      Head: Normocephalic and atraumatic.   Eyes:      General: No scleral icterus.     Pupils: Pupils are equal, round, and reactive to light.   Cardiovascular:      Rate and Rhythm: Normal rate and regular rhythm.      Heart sounds: Normal heart sounds. No murmur heard.     No friction rub. No gallop.   Pulmonary:      Effort: Pulmonary effort is normal. No respiratory distress.      Breath sounds: Normal breath sounds. No wheezing or rales.   Chest:      " "Chest wall: No tenderness.   Abdominal:      General: Bowel sounds are normal. There is no distension.      Palpations: Abdomen is soft.      Tenderness: There is no abdominal tenderness. There is no rebound.   Musculoskeletal:         General: Normal range of motion.      Cervical back: Normal range of motion and neck supple.      Right lower leg: Edema present.      Left lower leg: No edema.   Skin:     Findings: No rash.   Neurological:      Mental Status: She is alert and oriented to person, place, and time.           Laboratory Results:        Invalid input(s): \"ALBUMIN\"    Results for orders placed or performed in visit on 02/04/25   B-Type Natriuretic Peptide(BNP)   Result Value Ref Range     (H) 0 - 100 pg/mL   Magnesium   Result Value Ref Range    Magnesium 1.9 1.9 - 2.7 mg/dL   PTH, intact   Result Value Ref Range    .3 (H) 12.0 - 88.0 pg/mL   Vitamin D 25 hydroxy   Result Value Ref Range    Vit D, 25-Hydroxy 35.0 30.0 - 100.0 ng/mL   Phosphorus   Result Value Ref Range    Phosphorus 3.2 2.3 - 4.1 mg/dL       Administrative Statements   I have spent a total time of 25 minutes in caring for this patient on the day of the visit/encounter including Importance of tx compliance, Risk factor reductions, Impressions, Counseling / Coordination of care, Documenting in the medical record, Reviewing/placing orders in the medical record (including tests, medications, and/or procedures), and Obtaining or reviewing history  .  "

## 2025-03-24 NOTE — ASSESSMENT & PLAN NOTE
-- BMI 43.26 will fluctuate based on volume status    Orders:    CBC w/o differential; Future    Albumin / creatinine urine ratio; Future    PTH, intact; Future    Vitamin D 25 hydroxy; Future    Phosphorus; Future    Comprehensive metabolic panel; Future    Uric acid; Future    Blood gas, venous; Future

## 2025-03-24 NOTE — ASSESSMENT & PLAN NOTE
-- Non-smoker quit many years ago  --Saturating 100% on room air  Orders:    CBC w/o differential; Future    Albumin / creatinine urine ratio; Future    PTH, intact; Future    Vitamin D 25 hydroxy; Future    Phosphorus; Future    Comprehensive metabolic panel; Future    Uric acid; Future    Blood gas, venous; Future

## 2025-03-26 ENCOUNTER — OFFICE VISIT (OUTPATIENT)
Dept: PULMONOLOGY | Facility: MEDICAL CENTER | Age: 80
End: 2025-03-26
Payer: COMMERCIAL

## 2025-03-26 VITALS
HEIGHT: 59 IN | OXYGEN SATURATION: 92 % | WEIGHT: 210 LBS | HEART RATE: 74 BPM | RESPIRATION RATE: 18 BRPM | DIASTOLIC BLOOD PRESSURE: 68 MMHG | SYSTOLIC BLOOD PRESSURE: 112 MMHG | TEMPERATURE: 97.1 F | BODY MASS INDEX: 42.33 KG/M2

## 2025-03-26 DIAGNOSIS — I34.0 NONRHEUMATIC MITRAL VALVE REGURGITATION: ICD-10-CM

## 2025-03-26 DIAGNOSIS — G47.33 OBSTRUCTIVE SLEEP APNEA: Chronic | ICD-10-CM

## 2025-03-26 DIAGNOSIS — I48.19 PERSISTENT ATRIAL FIBRILLATION (HCC): ICD-10-CM

## 2025-03-26 DIAGNOSIS — G47.34 NOCTURNAL HYPOXEMIA: Primary | ICD-10-CM

## 2025-03-26 DIAGNOSIS — D86.9 SARCOIDOSIS: Chronic | ICD-10-CM

## 2025-03-26 PROCEDURE — 99214 OFFICE O/P EST MOD 30 MIN: CPT | Performed by: INTERNAL MEDICINE

## 2025-03-26 NOTE — PATIENT INSTRUCTIONS
Use oxygen 2 L at night and can use intermittently during the day if needed    Continue Demadex-torsemide 20 mg 5 days a week

## 2025-03-29 NOTE — ASSESSMENT & PLAN NOTE
She will continue use oxygen 2 L at bedtime and 2 L as needed during the day with activity.  Room air O2 saturation at rest today was 91 to 92% and on room air after walking 100 feet lowest O2 saturation was 91%

## 2025-03-29 NOTE — ASSESSMENT & PLAN NOTE
Has mild to moderate HERMANN but could not tolerate CPAP therapy.  She does use oxygen 3 L at bedtime.  DME company is [employer].  Not have any excessive daytime somnolence

## 2025-03-29 NOTE — ASSESSMENT & PLAN NOTE
She did have mitral valve clipping procedure on 1/15/2025 at [facility] for her severe mitral regurgitation and this did improve the leakage.  Follow-up echocardiogram done on 2/19/2025 showed mild to moderate MR.  She does have mild reduced LV systolic function with left ventricular ejection fraction of 45 to 50%    She is on torsemide 20 mg which she takes 5 days a week

## 2025-03-29 NOTE — PROGRESS NOTES
Assessment & Plan        Problem List Items Addressed This Visit          Cardiovascular and Mediastinum    Persistent atrial fibrillation (HCC)    She is on Eliquis 5 mg twice a day for anticoagulation.         Nonrheumatic mitral valve regurgitation    She did have mitral valve clipping procedure on 1/15/2025 at [facility] for her severe mitral regurgitation and this did improve the leakage.  Follow-up echocardiogram done on 2/19/2025 showed mild to moderate MR.  She does have mild reduced LV systolic function with left ventricular ejection fraction of 45 to 50%    She is on torsemide 20 mg which she takes 5 days a week            Respiratory    Obstructive sleep apnea (Chronic)    Has mild to moderate HERMANN but could not tolerate CPAP therapy.  She does use oxygen 3 L at bedtime.  DME company is [employer].  Not have any excessive daytime somnolence         Nocturnal hypoxemia - Primary    She will continue use oxygen 2 L at bedtime and 2 L as needed during the day with activity.  Room air O2 saturation at rest today was 91 to 92% and on room air after walking 100 feet lowest O2 saturation was 91%            Other    Sarcoidosis (Chronic)    Dianelys was diagnosed with sarcoidosis by right cervical lymph node biopsy in 2014.  At that time she also had some mild bilateral hilar and mediastinal adenopathy and some mild retroperitoneal adenopathy.  Has no sign of any parenchymal lung disease and has not needed any steroid therapy.              Cc: Some leg swelling but better      HPI    Dianelys did not undergo mitral valve THERESA clipping on 1/15/2025 at [facility] for severe mitral regurgitation.  She did spend a couple days there afterwards because she did develop some fluid overload that responded to intravenous Lasix.  She did have a follow-up echocardiogram done on 2/19/2025 showed some mild to moderate mitral regurgitation.  Right ventricle was enlarged and systolic function  reduced.  She has severe functional tricuspid regurgitation.  Left ventricular systolic function mild reduced with left ventricular ejection fraction of 45 to 50%.  Pulmonary pressure could not be estimated.    Overall she does have some mild improvement in exertional shortness of breath.  She still gets leg edema but this is improved with using torsemide 20 mg daily.  Not having any wheezing.  She states she takes her torsemide 20 mg 5 days a week and this is seems to be keeping edema at a minimum lower extremities.    She does have history of mild to moderate HERMANN with overall AHI of 25 and does have sleep-related hypoxemia.  She cannot tolerate CPAP therapy and thus is on 2 L of oxygen at bedtime.  DME company is [id number].  If needed she will sometimes use oxygen during the day.    She has history of TAVR procedure on 8/15/2024 for severe aortic stenosis at [facility].  Has underlying pulmonary hypertension.  Has history of CABG x 4 in 2017 and had a ICD device placed 2017.  She also has history of left bundle branch block and paroxysmal atrial fibrillation.  She is on anticoagulation Eliquis.  Also has hypertension and hypothyroidism.    She has history of mild sarcoidosis diagnosed in 2014 by right cervical lymph node biopsy done at [facility].  This showed noncaseating granulomatosis she had some mild stoma and bilateral adenopathy at that time as well as some abdominal retroperitoneal adenopathy.  No sign of any interstitial lung disease.  Last spirometry done in March 2023 showed normal lung volumes.    She also has chronic kidney disease stage IIIb.    Past Medical History:   Diagnosis Date    Coronary artery disease     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HCC)     bullous    Hypertension     Obesity     Sarcoidosis     SOB (shortness of breath)        Past Surgical History:   Procedure Laterality Date    CARDIAC SURGERY  06/02/2017    CARDIAC VALVE REPLACEMENT  08/15/2024    aortic valve    CATARACT  EXTRACTION Left 07/28/2019    CYST REMOVAL      Lipoma    DENTAL SURGERY  12/2023    DILATION AND CURETTAGE OF UTERUS      EYE SURGERY      INSERT / REPLACE / REMOVE PACEMAKER  12/20/2017    IR BIOPSY LYMPH NODE  08/11/2023    OVARIAN CYST REMOVAL           Current Outpatient Medications:     Acetaminophen 80 MG TBDP, Take by mouth if needed, Disp: , Rfl:     apixaban (ELIQUIS) 5 mg, Take 5 mg by mouth 2 (two) times a day 6/2/2022 stopped on 5/31/2022 for Procedure, Disp: , Rfl:     atorvastatin (LIPITOR) 10 mg tablet, take 1 tablet by mouth once daily, Disp: 90 tablet, Rfl: 1    Cholecalciferol (Vitamin D3) 1000 units CAPS, Take by mouth in the morning, Disp: , Rfl:     Entresto 24-26 MG TABS, Take 1 tablet by mouth 2 (two) times a day, Disp: , Rfl:     levothyroxine 50 mcg tablet, take 1 tablet by mouth once daily, Disp: 90 tablet, Rfl: 1    metoprolol succinate (TOPROL-XL) 25 mg 24 hr tablet, Take 25 mg by mouth daily, Disp: , Rfl:     nystatin powder, apply topically four times a day, Disp: 60 g, Rfl: 0    oxygen gas, Inhale daily at bedtime, Disp: , Rfl:     torsemide (DEMADEX) 20 mg tablet, Take 20 mg by mouth daily, Disp: , Rfl:     aspirin (ECOTRIN LOW STRENGTH) 81 mg EC tablet, Take 81 mg by mouth if needed (Patient not taking: Reported on 4/15/2024), Disp: , Rfl:     Cyanocobalamin (Vitamin B 12) 100 MCG LOZG, Take 5,000 mcg by mouth in the morning (Patient not taking: Reported on 3/24/2025), Disp: , Rfl:     Entresto 49-51 MG TABS, Take by mouth 2 (two) times a day TAKE 24-26 MG TWICE A DAY (Patient not taking: Reported on 9/23/2024), Disp: , Rfl:     furosemide (LASIX) 40 mg tablet, Take 40 mg by mouth daily (Patient not taking: Reported on 3/24/2025), Disp: , Rfl:     Glucosamine Sulfate 500 MG TABS, Take by mouth daily (Patient not taking: Reported on 8/20/2024), Disp: , Rfl:     glucosamine-chondroitin 500-400 MG tablet, Take 1 tablet by mouth daily  (Patient not taking: Reported on 8/20/2024), Disp:  ", Rfl:     metoprolol succinate (TOPROL-XL) 50 mg 24 hr tablet, take 1 tablet by mouth once daily (Patient not taking: Reported on 3/24/2025), Disp: 90 tablet, Rfl: 1    Multiple Vitamins-Minerals (HAIR SKIN AND NAILS FORMULA PO), Take by mouth (Patient not taking: Reported on 4/15/2024), Disp: , Rfl:     No Known Allergies    Social History     Tobacco Use    Smoking status: Former     Current packs/day: 0.00     Average packs/day: 1.5 packs/day for 31.0 years (46.5 ttl pk-yrs)     Types: Cigarettes     Start date:      Quit date:      Years since quittin.2    Smokeless tobacco: Never   Substance Use Topics    Alcohol use: Not Currently     Comment: rare         Family History   Problem Relation Age of Onset    Emphysema Father     Cancer Sister         ovarian    Heart attack Brother     Heart disease Family        Review of Systems   Constitutional:  Negative for chills, fever and unexpected weight change.   HENT:  Negative for congestion, rhinorrhea and sore throat.    Eyes:  Negative for discharge and redness.   Respiratory:  Positive for shortness of breath.    Cardiovascular:  Positive for leg swelling. Negative for chest pain and palpitations.   Gastrointestinal:  Negative for abdominal distention, abdominal pain and nausea.   Endocrine: Negative for polydipsia and polyphagia.   Genitourinary:  Negative for dysuria.   Musculoskeletal:  Negative for joint swelling and myalgias.   Skin:  Negative for rash.   Neurological:  Negative for light-headedness.   Psychiatric/Behavioral:  Negative for decreased concentration.            Vitals:    25 1036   BP: 112/68   Pulse: 74   Resp: 18   Temp: (!) 97.1 °F (36.2 °C)   SpO2: 92%     Height: 4' 11\" (149.9 cm)  IBW (Ideal Body Weight): 43.2 kg  Body mass index is 42.41 kg/m².  Weight (last 2 days)       None                Physical Exam  Vitals reviewed.   Constitutional:       General: She is not in acute distress.     Appearance: Normal " appearance. She is well-developed. She is obese.   HENT:      Head: Normocephalic.      Right Ear: External ear normal.      Nose: Nose normal.      Mouth/Throat:      Mouth: Mucous membranes are moist.      Pharynx: Oropharynx is clear. No oropharyngeal exudate.   Eyes:      Conjunctiva/sclera: Conjunctivae normal.      Pupils: Pupils are equal, round, and reactive to light.   Cardiovascular:      Rate and Rhythm: Normal rate and regular rhythm.      Heart sounds: Normal heart sounds.   Pulmonary:      Effort: Pulmonary effort is normal.      Comments: Lung sounds are clear.  No wheezes, crackles or rhonchi  Abdominal:      General: There is no distension.      Palpations: Abdomen is soft.      Tenderness: There is no abdominal tenderness.   Musculoskeletal:      Cervical back: Neck supple.      Comments: Has +1 lower tibial edema bilaterally.  No cyanosis   Lymphadenopathy:      Cervical: No cervical adenopathy.   Skin:     General: Skin is warm and dry.   Neurological:      General: No focal deficit present.      Mental Status: She is alert and oriented to person, place, and time.   Psychiatric:         Mood and Affect: Mood normal.         Behavior: Behavior normal.         Thought Content: Thought content normal.

## 2025-03-29 NOTE — ASSESSMENT & PLAN NOTE
Dianelys was diagnosed with sarcoidosis by right cervical lymph node biopsy in 2014.  At that time she also had some mild bilateral hilar and mediastinal adenopathy and some mild retroperitoneal adenopathy.  Has no sign of any parenchymal lung disease and has not needed any steroid therapy.

## 2025-05-01 ENCOUNTER — RA CDI HCC (OUTPATIENT)
Dept: OTHER | Facility: HOSPITAL | Age: 80
End: 2025-05-01

## 2025-05-01 NOTE — PROGRESS NOTES
HCC coding opportunities          Chart Reviewed number of suggestions sent to Provider: 2  I13.0  I50.22     Patients Insurance     Medicare Insurance: [employer]

## 2025-05-08 ENCOUNTER — OFFICE VISIT (OUTPATIENT)
Dept: FAMILY MEDICINE CLINIC | Facility: CLINIC | Age: 80
End: 2025-05-08
Payer: COMMERCIAL

## 2025-05-08 VITALS
WEIGHT: 208.8 LBS | DIASTOLIC BLOOD PRESSURE: 80 MMHG | BODY MASS INDEX: 42.09 KG/M2 | SYSTOLIC BLOOD PRESSURE: 124 MMHG | RESPIRATION RATE: 18 BRPM | OXYGEN SATURATION: 94 % | HEART RATE: 88 BPM | TEMPERATURE: 97.4 F | HEIGHT: 59 IN

## 2025-05-08 DIAGNOSIS — I48.91 ATRIAL FIBRILLATION, UNSPECIFIED TYPE (HCC): Primary | ICD-10-CM

## 2025-05-08 DIAGNOSIS — R21 RASH: ICD-10-CM

## 2025-05-08 DIAGNOSIS — Z98.890 S/P AORTIC VALVE REPAIR: ICD-10-CM

## 2025-05-08 DIAGNOSIS — D86.9 SARCOIDOSIS: Chronic | ICD-10-CM

## 2025-05-08 DIAGNOSIS — E53.8 B12 DEFICIENCY: ICD-10-CM

## 2025-05-08 PROCEDURE — G2211 COMPLEX E/M VISIT ADD ON: HCPCS | Performed by: FAMILY MEDICINE

## 2025-05-08 PROCEDURE — 99214 OFFICE O/P EST MOD 30 MIN: CPT | Performed by: FAMILY MEDICINE

## 2025-05-08 RX ORDER — CLOTRIMAZOLE AND BETAMETHASONE DIPROPIONATE 10; .64 MG/G; MG/G
CREAM TOPICAL 2 TIMES DAILY
Qty: 45 G | Refills: 0 | Status: SHIPPED | OUTPATIENT
Start: 2025-05-08

## 2025-05-08 RX ORDER — TORSEMIDE 100 MG/1
0.5 TABLET ORAL DAILY
COMMUNITY
Start: 2025-04-21

## 2025-05-08 NOTE — PROGRESS NOTES
Name: Dianelys Kim      : 1945      MRN: [id number]  Encounter Provider: Jaye Curry MD  Encounter Date: 2025   Encounter department: [facility]    Assessment & Plan  Atrial fibrillation, unspecified type (HCC)  A-fib is currently stable         S/P aortic valve repair  Unfortunately even after repair the patient has been still having shortness of breath she is working very closely with her cardiologist       Sarcoidosis  I would like to rule out to make sure the sarcoidosis is stable  Orders:  •  Angiotensin converting enzyme; Future    Rash  Recommend trialing medication if no improvement to please let me know  Orders:  •  clotrimazole-betamethasone (LOTRISONE) 1-0.05 % cream; Apply topically 2 (two) times a day    B12 deficiency    Orders:  •  Vitamin B12; Future         History of Present Illness     HPI  80-year-old female presenting to the office for follow-up on chronic conditions.  Continues to have shortness of breath and fatigue even after her replacement of her bowels.  States that the shortness of breath is along with the congestion.  Has not had her sarcoidosis numbers checked but has been seeing her specialist.  Patient states that she does not have an active rash today but has had a rash over her breast and would like to get a medication to prophylactically help this  Review of Systems   Constitutional:  Negative for activity change, appetite change, chills, fatigue and fever.   HENT:  Positive for congestion.    Respiratory:  Positive for shortness of breath. Negative for cough and chest tightness.    Cardiovascular:  Negative for chest pain and leg swelling.   Gastrointestinal:  Negative for abdominal distention, abdominal pain, constipation, diarrhea, nausea and vomiting.   All other systems reviewed and are negative.    Past Medical History:   Diagnosis Date   • Coronary artery disease    • 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HCC)     bullous   • Hypertension    •  Obesity    • Sarcoidosis    • SOB (shortness of breath)      Past Surgical History:   Procedure Laterality Date   • CARDIAC SURGERY  2017   • CARDIAC VALVE REPLACEMENT  08/15/2024    aortic valve   • CATARACT EXTRACTION Left 2019   • CYST REMOVAL      Lipoma   • DENTAL SURGERY  2023   • DILATION AND CURETTAGE OF UTERUS     • EYE SURGERY     • INSERT / REPLACE / REMOVE PACEMAKER  2017   • IR BIOPSY LYMPH NODE  2023   • OVARIAN CYST REMOVAL       Family History   Problem Relation Age of Onset   • Emphysema Father    • Cancer Sister         ovarian   • Heart attack Brother    • Heart disease Family      Social History     Tobacco Use   • Smoking status: Former     Current packs/day: 0.00     Average packs/day: 1.5 packs/day for 31.0 years (46.5 ttl pk-yrs)     Types: Cigarettes     Start date:      Quit date:      Years since quittin.3   • Smokeless tobacco: Never   Vaping Use   • Vaping status: Never Used   Substance and Sexual Activity   • Alcohol use: Not Currently     Comment: rare   • Drug use: No   • Sexual activity: Not on file     Current Outpatient Medications on File Prior to Visit   Medication Sig   • Acetaminophen 80 MG TBDP Take by mouth if needed   • apixaban (ELIQUIS) 5 mg Take 5 mg by mouth 2 (two) times a day 2022 stopped on 2022 for Procedure   • aspirin (ECOTRIN LOW STRENGTH) 81 mg EC tablet Take 81 mg by mouth if needed   • atorvastatin (LIPITOR) 10 mg tablet take 1 tablet by mouth once daily   • Cholecalciferol (Vitamin D3) 1000 units CAPS Take by mouth in the morning   • Entresto 24-26 MG TABS Take 1 tablet by mouth 2 (two) times a day   • oxygen gas Inhale daily at bedtime   • torsemide (DEMADEX) 100 mg tablet Take 0.5 tablets by mouth in the morning   • Cyanocobalamin (Vitamin B 12) 100 MCG LOZG Take 5,000 mcg by mouth in the morning (Patient not taking: Reported on 3/24/2025)   • Entresto 49-51 MG TABS Take by mouth 2 (two) times a day TAKE 24-26  "MG TWICE A DAY (Patient not taking: Reported on 9/23/2024)   • furosemide (LASIX) 40 mg tablet Take 40 mg by mouth daily (Patient not taking: Reported on 3/24/2025)   • Glucosamine Sulfate 500 MG TABS Take by mouth daily (Patient not taking: Reported on 8/20/2024)   • metoprolol succinate (TOPROL-XL) 25 mg 24 hr tablet Take 25 mg by mouth daily   • metoprolol succinate (TOPROL-XL) 50 mg 24 hr tablet take 1 tablet by mouth once daily (Patient not taking: Reported on 3/24/2025)   • Multiple Vitamins-Minerals (HAIR SKIN AND NAILS FORMULA PO) Take by mouth (Patient not taking: Reported on 4/15/2024)   • nystatin powder apply topically four times a day (Patient not taking: Reported on 5/8/2025)   • torsemide (DEMADEX) 20 mg tablet Take 20 mg by mouth daily (Patient not taking: Reported on 5/8/2025)   • [DISCONTINUED] glucosamine-chondroitin 500-400 MG tablet Take 1 tablet by mouth daily  (Patient not taking: Reported on 8/20/2024)     No Known Allergies  Immunization History   Administered Date(s) Administered   • COVID-19 MODERNA VACC 0.5 ML IM 02/25/2021, 03/26/2021   • Influenza Split High Dose Preservative Free IM 10/01/2014   • Influenza, high dose seasonal 0.7 mL 12/13/2021, 11/02/2022, 11/02/2023   • Influenza, recombinant, quadrivalent,injectable, preservative free 10/09/2019   • Influenza, seasonal, injectable 09/10/2013   • Pneumococcal 10/09/2018   • Pneumococcal Conjugate 13-Valent 10/09/2017   • Pneumococcal Conjugate PCV 7 10/09/2018   • Pneumococcal Polysaccharide PPV23 10/01/2014   • TD (adult) Preservative Free 10/14/2019   • Tuberculin Skin Test 06/15/2017, 06/22/2017     Objective   /80 (BP Location: Left arm, Patient Position: Sitting, Cuff Size: Standard)   Pulse 88   Temp (!) 97.4 °F (36.3 °C) (Temporal)   Resp 18   Ht 4' 11\" (1.499 m)   Wt 94.7 kg (208 lb 12.8 oz)   SpO2 94%   BMI 42.17 kg/m²     Physical Exam  Constitutional:       Appearance: She is well-developed.   HENT:      Head: " Normocephalic and atraumatic.     Cardiovascular:      Rate and Rhythm: Normal rate.      Heart sounds: Murmur heard.   Pulmonary:      Effort: Pulmonary effort is normal.     Musculoskeletal:         General: Normal range of motion.     Neurological:      Mental Status: She is alert and oriented to person, place, and time.     Psychiatric:         Behavior: Behavior normal.         Thought Content: Thought content normal.         Judgment: Judgment normal.

## 2025-05-08 NOTE — ASSESSMENT & PLAN NOTE
I would like to rule out to make sure the sarcoidosis is stable  Orders:  •  Angiotensin converting enzyme; Future

## 2025-05-13 ENCOUNTER — APPOINTMENT (OUTPATIENT)
Dept: LAB | Facility: CLINIC | Age: 80
End: 2025-05-13
Payer: COMMERCIAL

## 2025-05-13 DIAGNOSIS — D86.9 SARCOIDOSIS: Chronic | ICD-10-CM

## 2025-05-13 DIAGNOSIS — E03.9 HYPOTHYROIDISM, UNSPECIFIED TYPE: ICD-10-CM

## 2025-05-13 DIAGNOSIS — E53.8 B12 DEFICIENCY: ICD-10-CM

## 2025-05-13 LAB — VIT B12 SERPL-MCNC: 492 PG/ML (ref 180–914)

## 2025-05-13 PROCEDURE — 82607 VITAMIN B-12: CPT

## 2025-05-13 PROCEDURE — 36415 COLL VENOUS BLD VENIPUNCTURE: CPT

## 2025-05-13 PROCEDURE — 82164 ANGIOTENSIN I ENZYME TEST: CPT

## 2025-05-13 RX ORDER — LEVOTHYROXINE SODIUM 50 UG/1
50 TABLET ORAL DAILY
Qty: 90 TABLET | Refills: 1 | Status: SHIPPED | OUTPATIENT
Start: 2025-05-13

## 2025-05-14 ENCOUNTER — RESULTS FOLLOW-UP (OUTPATIENT)
Dept: FAMILY MEDICINE CLINIC | Facility: CLINIC | Age: 80
End: 2025-05-14

## 2025-05-15 LAB — ACE SERPL-CCNC: 78 U/L (ref 14–82)

## 2025-07-16 DIAGNOSIS — E78.5 HYPERLIPIDEMIA, UNSPECIFIED HYPERLIPIDEMIA TYPE: ICD-10-CM

## 2025-07-17 RX ORDER — ATORVASTATIN CALCIUM 10 MG/1
10 TABLET, FILM COATED ORAL DAILY
Qty: 90 TABLET | Refills: 1 | Status: SHIPPED | OUTPATIENT
Start: 2025-07-17

## 2025-08-08 ENCOUNTER — TELEPHONE (OUTPATIENT)
Age: 80
End: 2025-08-08

## (undated) RX ORDER — MOXIFLOXACIN HYDROCHLORIDE 5 MG/ML: 1 SOLUTION/ DROPS OPHTHALMIC

## (undated) RX ORDER — PREDNISOLONE ACETATE 10 MG/ML: 1 SUSPENSION/ DROPS OPHTHALMIC

## (undated) RX ORDER — DORZOLAMIDE HYDROCHLORIDE AND TIMOLOL MALEATE 20; 5 MG/ML; MG/ML: 1 SOLUTION/ DROPS OPHTHALMIC TWICE A DAY

## (undated) RX ORDER — ATROPINE SULFATE 10 MG/ML: 1 SOLUTION/ DROPS OPHTHALMIC TWICE A DAY